# Patient Record
Sex: FEMALE | Race: WHITE | NOT HISPANIC OR LATINO | Employment: UNEMPLOYED | URBAN - METROPOLITAN AREA
[De-identification: names, ages, dates, MRNs, and addresses within clinical notes are randomized per-mention and may not be internally consistent; named-entity substitution may affect disease eponyms.]

---

## 2019-07-11 ENCOUNTER — HISTORICAL (OUTPATIENT)
Dept: RADIOLOGY | Facility: HOSPITAL | Age: 39
End: 2019-07-11

## 2019-10-05 ENCOUNTER — HISTORICAL (OUTPATIENT)
Dept: LAB | Facility: HOSPITAL | Age: 39
End: 2019-10-05

## 2019-10-05 LAB
ABS NEUT (OLG): 6.24 X10(3)/MCL (ref 2.1–9.2)
ALBUMIN SERPL-MCNC: 4.2 GM/DL (ref 3.4–5)
ALBUMIN/GLOB SERPL: 1.2 RATIO (ref 1.1–2)
ALP SERPL-CCNC: 56 UNIT/L (ref 46–116)
ALT SERPL-CCNC: 16 UNIT/L (ref 12–78)
APPEARANCE, UA: CLEAR
AST SERPL-CCNC: 15 UNIT/L (ref 15–37)
BACTERIA SPEC CULT: ABNORMAL
BASOPHILS # BLD AUTO: 0 X10(3)/MCL (ref 0–0.2)
BASOPHILS NFR BLD AUTO: 0 %
BILIRUB SERPL-MCNC: 0.3 MG/DL (ref 0.2–1)
BILIRUB UR QL STRIP: NEGATIVE
BILIRUBIN DIRECT+TOT PNL SERPL-MCNC: 0.13 MG/DL (ref 0–0.2)
BILIRUBIN DIRECT+TOT PNL SERPL-MCNC: 0.17 MG/DL (ref 0–0.8)
BUN SERPL-MCNC: 7.8 MG/DL (ref 7–18)
CALCIUM SERPL-MCNC: 9.4 MG/DL (ref 8.5–10.1)
CHLORIDE SERPL-SCNC: 103 MMOL/L (ref 98–107)
CHOLEST SERPL-MCNC: 194 MG/DL (ref 0–200)
CHOLEST/HDLC SERPL: 2.5 {RATIO} (ref 0–4)
CO2 SERPL-SCNC: 26.9 MMOL/L (ref 21–32)
COLOR UR: YELLOW
CREAT SERPL-MCNC: 0.87 MG/DL (ref 0.6–1.3)
DEPRECATED CALCIDIOL+CALCIFEROL SERPL-MC: 16.36 NG/ML (ref 30–80)
EOSINOPHIL # BLD AUTO: 0.1 X10(3)/MCL (ref 0–0.9)
EOSINOPHIL NFR BLD AUTO: 1 %
ERYTHROCYTE [DISTWIDTH] IN BLOOD BY AUTOMATED COUNT: 12.3 % (ref 11.5–17)
EST. AVERAGE GLUCOSE BLD GHB EST-MCNC: 91 MG/DL
GLOBULIN SER-MCNC: 3.6 GM/DL (ref 2.4–3.5)
GLUCOSE (UA): NEGATIVE
GLUCOSE SERPL-MCNC: 99 MG/DL (ref 74–106)
HBA1C MFR BLD: 4.8 % (ref 4.5–6.2)
HCT VFR BLD AUTO: 46.5 % (ref 37–47)
HDLC SERPL-MCNC: 77 MG/DL (ref 40–60)
HGB BLD-MCNC: 15 GM/DL (ref 12–16)
HGB UR QL STRIP: NEGATIVE
IMM GRANULOCYTES # BLD AUTO: 0.01 % (ref 0–0.02)
IMM GRANULOCYTES NFR BLD AUTO: 0.1 % (ref 0–0.43)
KETONES UR QL STRIP: NEGATIVE
LDLC SERPL CALC-MCNC: 107 MG/DL (ref 0–129)
LEUKOCYTE ESTERASE UR QL STRIP: ABNORMAL
LYMPHOCYTES # BLD AUTO: 1.6 X10(3)/MCL (ref 0.6–4.6)
LYMPHOCYTES NFR BLD AUTO: 19 %
MCH RBC QN AUTO: 32.2 PG (ref 27–31)
MCHC RBC AUTO-ENTMCNC: 32.3 GM/DL (ref 33–36)
MCV RBC AUTO: 99.8 FL (ref 80–94)
MONOCYTES # BLD AUTO: 0.5 X10(3)/MCL (ref 0.1–1.3)
MONOCYTES NFR BLD AUTO: 6 %
MUCOUS THREADS URNS QL MICRO: SLIGHT
NEUTROPHILS # BLD AUTO: 6.24 X10(3)/MCL (ref 1.4–7.9)
NEUTROPHILS NFR BLD AUTO: 74 %
NITRITE UR QL STRIP: NEGATIVE
PH UR STRIP: 6 [PH] (ref 5–9)
PLATELET # BLD AUTO: 369 X10(3)/MCL (ref 130–400)
PMV BLD AUTO: 10.2 FL (ref 9.4–12.4)
POTASSIUM SERPL-SCNC: 4.4 MMOL/L (ref 3.5–5.1)
PROT SERPL-MCNC: 7.8 GM/DL (ref 6.4–8.2)
PROT UR QL STRIP: NEGATIVE
RBC # BLD AUTO: 4.66 X10(6)/MCL (ref 4.2–5.4)
RBC #/AREA URNS HPF: ABNORMAL /HPF
SODIUM SERPL-SCNC: 139 MMOL/L (ref 136–145)
SP GR UR STRIP: 1.02 (ref 1–1.03)
SQUAMOUS EPITHELIAL, UA: ABNORMAL
TRIGL SERPL-MCNC: 51 MG/DL
TSH SERPL-ACNC: 1.56 MIU/ML (ref 0.36–3.74)
UROBILINOGEN UR STRIP-ACNC: 0.2
VLDLC SERPL CALC-MCNC: 10 MG/DL
WBC # SPEC AUTO: 8.4 X10(3)/MCL (ref 4.5–11.5)
WBC #/AREA URNS HPF: ABNORMAL /HPF

## 2020-09-10 ENCOUNTER — HISTORICAL (OUTPATIENT)
Dept: LAB | Facility: HOSPITAL | Age: 40
End: 2020-09-10

## 2020-12-23 ENCOUNTER — HISTORICAL (OUTPATIENT)
Dept: RADIOLOGY | Facility: HOSPITAL | Age: 40
End: 2020-12-23

## 2022-04-11 ENCOUNTER — HISTORICAL (OUTPATIENT)
Dept: ADMINISTRATIVE | Facility: HOSPITAL | Age: 42
End: 2022-04-11

## 2022-04-19 ENCOUNTER — HISTORICAL (OUTPATIENT)
Dept: RADIOLOGY | Facility: HOSPITAL | Age: 42
End: 2022-04-19

## 2022-04-19 ENCOUNTER — HISTORICAL (OUTPATIENT)
Dept: ADMINISTRATIVE | Facility: HOSPITAL | Age: 42
End: 2022-04-19

## 2022-04-29 VITALS
HEIGHT: 64 IN | WEIGHT: 177.69 LBS | BODY MASS INDEX: 30.34 KG/M2 | DIASTOLIC BLOOD PRESSURE: 82 MMHG | OXYGEN SATURATION: 97 % | SYSTOLIC BLOOD PRESSURE: 124 MMHG

## 2022-10-03 DIAGNOSIS — Z12.31 SCREENING MAMMOGRAM FOR BREAST CANCER: Primary | ICD-10-CM

## 2022-11-05 PROBLEM — F41.1 GENERALIZED ANXIETY DISORDER: Status: ACTIVE | Noted: 2022-11-05

## 2022-11-05 PROBLEM — Z00.00 WELLNESS EXAMINATION: Status: ACTIVE | Noted: 2022-11-05

## 2022-11-05 PROBLEM — E66.9 OBESITY: Status: ACTIVE | Noted: 2022-11-05

## 2023-02-06 PROBLEM — Z00.00 WELLNESS EXAMINATION: Status: RESOLVED | Noted: 2022-11-05 | Resolved: 2023-02-06

## 2023-11-21 ENCOUNTER — LAB VISIT (OUTPATIENT)
Dept: LAB | Facility: HOSPITAL | Age: 43
End: 2023-11-21
Attending: FAMILY MEDICINE
Payer: COMMERCIAL

## 2023-11-21 DIAGNOSIS — Z00.00 WELLNESS EXAMINATION: ICD-10-CM

## 2023-11-21 LAB
ALBUMIN SERPL-MCNC: 3.6 G/DL (ref 3.5–5)
ALBUMIN/GLOB SERPL: 1 RATIO (ref 1.1–2)
ALP SERPL-CCNC: 80 UNIT/L (ref 40–150)
ALT SERPL-CCNC: 10 UNIT/L (ref 0–55)
AST SERPL-CCNC: 16 UNIT/L (ref 5–34)
BASOPHILS # BLD AUTO: 0.02 X10(3)/MCL
BASOPHILS NFR BLD AUTO: 0.2 %
BILIRUB SERPL-MCNC: 0.3 MG/DL
BUN SERPL-MCNC: 7.9 MG/DL (ref 7–18.7)
CALCIUM SERPL-MCNC: 8.8 MG/DL (ref 8.4–10.2)
CHLORIDE SERPL-SCNC: 106 MMOL/L (ref 98–107)
CHOLEST SERPL-MCNC: 186 MG/DL
CHOLEST/HDLC SERPL: 3 {RATIO} (ref 0–5)
CO2 SERPL-SCNC: 23 MMOL/L (ref 22–29)
CREAT SERPL-MCNC: 0.75 MG/DL (ref 0.55–1.02)
DEPRECATED CALCIDIOL+CALCIFEROL SERPL-MC: 29.2 NG/ML (ref 30–80)
EOSINOPHIL # BLD AUTO: 0.16 X10(3)/MCL (ref 0–0.9)
EOSINOPHIL NFR BLD AUTO: 1.9 %
ERYTHROCYTE [DISTWIDTH] IN BLOOD BY AUTOMATED COUNT: 12.6 % (ref 11.5–17)
EST. AVERAGE GLUCOSE BLD GHB EST-MCNC: 96.8 MG/DL
FSH SERPL-ACNC: 3.74 MIU/ML
GFR SERPLBLD CREATININE-BSD FMLA CKD-EPI: >60 MLS/MIN/1.73/M2
GLOBULIN SER-MCNC: 3.5 GM/DL (ref 2.4–3.5)
GLUCOSE SERPL-MCNC: 98 MG/DL (ref 74–100)
HBA1C MFR BLD: 5 %
HCT VFR BLD AUTO: 44.6 % (ref 37–47)
HDLC SERPL-MCNC: 67 MG/DL (ref 35–60)
HGB BLD-MCNC: 14.1 G/DL (ref 12–16)
IMM GRANULOCYTES # BLD AUTO: 0.02 X10(3)/MCL (ref 0–0.04)
IMM GRANULOCYTES NFR BLD AUTO: 0.2 %
LDLC SERPL CALC-MCNC: 106 MG/DL (ref 50–140)
LYMPHOCYTES # BLD AUTO: 2.75 X10(3)/MCL (ref 0.6–4.6)
LYMPHOCYTES NFR BLD AUTO: 33 %
MCH RBC QN AUTO: 30.3 PG (ref 27–31)
MCHC RBC AUTO-ENTMCNC: 31.6 G/DL (ref 33–36)
MCV RBC AUTO: 95.7 FL (ref 80–94)
MONOCYTES # BLD AUTO: 0.51 X10(3)/MCL (ref 0.1–1.3)
MONOCYTES NFR BLD AUTO: 6.1 %
NEUTROPHILS # BLD AUTO: 4.87 X10(3)/MCL (ref 2.1–9.2)
NEUTROPHILS NFR BLD AUTO: 58.6 %
PLATELET # BLD AUTO: 383 X10(3)/MCL (ref 130–400)
PMV BLD AUTO: 9.8 FL (ref 7.4–10.4)
POTASSIUM SERPL-SCNC: 4.1 MMOL/L (ref 3.5–5.1)
PROT SERPL-MCNC: 7.1 GM/DL (ref 6.4–8.3)
RBC # BLD AUTO: 4.66 X10(6)/MCL (ref 4.2–5.4)
SODIUM SERPL-SCNC: 139 MMOL/L (ref 136–145)
T3FREE SERPL-MCNC: 3.43 PG/ML (ref 1.58–3.91)
T4 FREE SERPL-MCNC: 0.83 NG/DL (ref 0.7–1.48)
TRIGL SERPL-MCNC: 67 MG/DL (ref 37–140)
TSH SERPL-ACNC: 3.28 UIU/ML (ref 0.35–4.94)
VIT B12 SERPL-MCNC: 183 PG/ML (ref 213–816)
VLDLC SERPL CALC-MCNC: 13 MG/DL
WBC # SPEC AUTO: 8.33 X10(3)/MCL (ref 4.5–11.5)

## 2023-11-21 PROCEDURE — 84439 ASSAY OF FREE THYROXINE: CPT

## 2023-11-21 PROCEDURE — 84443 ASSAY THYROID STIM HORMONE: CPT

## 2023-11-21 PROCEDURE — 82607 VITAMIN B-12: CPT

## 2023-11-21 PROCEDURE — 82306 VITAMIN D 25 HYDROXY: CPT

## 2023-11-21 PROCEDURE — 83001 ASSAY OF GONADOTROPIN (FSH): CPT

## 2023-11-21 PROCEDURE — 85025 COMPLETE CBC W/AUTO DIFF WBC: CPT

## 2023-11-21 PROCEDURE — 84481 FREE ASSAY (FT-3): CPT

## 2023-11-21 PROCEDURE — 80061 LIPID PANEL: CPT

## 2023-11-21 PROCEDURE — 80053 COMPREHEN METABOLIC PANEL: CPT

## 2023-11-21 PROCEDURE — 36415 COLL VENOUS BLD VENIPUNCTURE: CPT

## 2023-11-21 PROCEDURE — 83036 HEMOGLOBIN GLYCOSYLATED A1C: CPT

## 2023-12-06 ENCOUNTER — HOSPITAL ENCOUNTER (OUTPATIENT)
Dept: RADIOLOGY | Facility: HOSPITAL | Age: 43
Discharge: HOME | End: 2023-12-06
Attending: FAMILY MEDICINE
Payer: COMMERCIAL

## 2023-12-06 DIAGNOSIS — Z12.31 SCREENING MAMMOGRAM FOR BREAST CANCER: ICD-10-CM

## 2023-12-06 PROCEDURE — 77067 MAMMO DIGITAL SCREENING BILAT WITH TOMO: ICD-10-PCS | Mod: 26,,, | Performed by: RADIOLOGY

## 2023-12-06 PROCEDURE — 77063 BREAST TOMOSYNTHESIS BI: CPT | Mod: 26,,, | Performed by: RADIOLOGY

## 2023-12-06 PROCEDURE — 77063 MAMMO DIGITAL SCREENING BILAT WITH TOMO: ICD-10-PCS | Mod: 26,,, | Performed by: RADIOLOGY

## 2023-12-06 PROCEDURE — 77067 SCR MAMMO BI INCL CAD: CPT | Mod: TC

## 2023-12-06 PROCEDURE — 77067 SCR MAMMO BI INCL CAD: CPT | Mod: 26,,, | Performed by: RADIOLOGY

## 2023-12-13 ENCOUNTER — HOSPITAL ENCOUNTER (OUTPATIENT)
Dept: RADIOLOGY | Facility: HOSPITAL | Age: 43
Discharge: HOME | End: 2023-12-13
Attending: FAMILY MEDICINE
Payer: COMMERCIAL

## 2023-12-13 VITALS — HEIGHT: 64 IN | BODY MASS INDEX: 36.56 KG/M2

## 2023-12-13 DIAGNOSIS — R92.8 ABNORMAL MAMMOGRAM: ICD-10-CM

## 2023-12-13 PROCEDURE — 77065 DX MAMMO INCL CAD UNI: CPT | Mod: TC,LT

## 2023-12-13 PROCEDURE — 77061 MAMMO DIGITAL DIAGNOSTIC LEFT WITH TOMO: ICD-10-PCS | Mod: 26,LT,, | Performed by: RADIOLOGY

## 2023-12-13 PROCEDURE — 77065 MAMMO DIGITAL DIAGNOSTIC LEFT WITH TOMO: ICD-10-PCS | Mod: 26,LT,, | Performed by: RADIOLOGY

## 2023-12-13 PROCEDURE — 76641 ULTRASOUND BREAST COMPLETE: CPT | Mod: 26,LT,, | Performed by: RADIOLOGY

## 2023-12-13 PROCEDURE — 77065 DX MAMMO INCL CAD UNI: CPT | Mod: 26,LT,, | Performed by: RADIOLOGY

## 2023-12-13 PROCEDURE — 76641 US BREAST LEFT COMPLETE: ICD-10-PCS | Mod: 26,LT,, | Performed by: RADIOLOGY

## 2023-12-13 PROCEDURE — 77061 BREAST TOMOSYNTHESIS UNI: CPT | Mod: 26,LT,, | Performed by: RADIOLOGY

## 2023-12-13 PROCEDURE — 76641 ULTRASOUND BREAST COMPLETE: CPT | Mod: TC,LT

## 2024-01-09 ENCOUNTER — HOSPITAL ENCOUNTER (EMERGENCY)
Facility: HOSPITAL | Age: 44
Discharge: HOME | End: 2024-01-09
Attending: FAMILY MEDICINE
Payer: COMMERCIAL

## 2024-01-09 VITALS
TEMPERATURE: 98 F | HEART RATE: 94 BPM | RESPIRATION RATE: 20 BRPM | HEIGHT: 64 IN | BODY MASS INDEX: 36.37 KG/M2 | DIASTOLIC BLOOD PRESSURE: 67 MMHG | WEIGHT: 213 LBS | OXYGEN SATURATION: 100 % | SYSTOLIC BLOOD PRESSURE: 113 MMHG

## 2024-01-09 DIAGNOSIS — R11.2 NAUSEA AND VOMITING, UNSPECIFIED VOMITING TYPE: Primary | ICD-10-CM

## 2024-01-09 DIAGNOSIS — R52 PAIN: ICD-10-CM

## 2024-01-09 LAB
AMPHET UR QL SCN: NEGATIVE
APPEARANCE UR: CLEAR
BACTERIA #/AREA URNS AUTO: ABNORMAL /HPF
BARBITURATE SCN PRESENT UR: NEGATIVE
BENZODIAZ UR QL SCN: NEGATIVE
BILIRUB UR QL STRIP.AUTO: ABNORMAL
CANNABINOIDS UR QL SCN: NEGATIVE
COCAINE UR QL SCN: NEGATIVE
COLOR UR AUTO: ABNORMAL
FLUAV AG UPPER RESP QL IA.RAPID: NOT DETECTED
FLUBV AG UPPER RESP QL IA.RAPID: NOT DETECTED
GLUCOSE UR QL STRIP.AUTO: NEGATIVE
KETONES UR QL STRIP.AUTO: 40
LEUKOCYTE ESTERASE UR QL STRIP.AUTO: NEGATIVE
MUCOUS THREADS URNS QL MICRO: ABNORMAL /LPF
NITRITE UR QL STRIP.AUTO: NEGATIVE
OPIATES UR QL SCN: NEGATIVE
PCP UR QL: NEGATIVE
PH UR STRIP.AUTO: 6 [PH]
PH UR: 6 [PH] (ref 3–11)
PROT UR QL STRIP.AUTO: 30
RBC #/AREA URNS AUTO: ABNORMAL /HPF
RBC UR QL AUTO: ABNORMAL
RSV A 5' UTR RNA NPH QL NAA+PROBE: NOT DETECTED
SARS-COV-2 RNA RESP QL NAA+PROBE: NOT DETECTED
SP GR UR STRIP.AUTO: >=1.03 (ref 1–1.03)
SPECIFIC GRAVITY, URINE AUTO (.000) (OHS): >=1.03 (ref 1–1.03)
SQUAMOUS #/AREA URNS AUTO: ABNORMAL /HPF
UROBILINOGEN UR STRIP-ACNC: 1
WBC #/AREA URNS AUTO: ABNORMAL /HPF

## 2024-01-09 PROCEDURE — 80307 DRUG TEST PRSMV CHEM ANLYZR: CPT | Performed by: FAMILY MEDICINE

## 2024-01-09 PROCEDURE — 81001 URINALYSIS AUTO W/SCOPE: CPT | Mod: XB | Performed by: FAMILY MEDICINE

## 2024-01-09 PROCEDURE — 0241U COVID/RSV/FLU A&B PCR: CPT | Performed by: FAMILY MEDICINE

## 2024-01-09 PROCEDURE — 99283 EMERGENCY DEPT VISIT LOW MDM: CPT

## 2024-01-09 PROCEDURE — 25000003 PHARM REV CODE 250: Performed by: FAMILY MEDICINE

## 2024-01-09 RX ORDER — ONDANSETRON 4 MG/1
8 TABLET, ORALLY DISINTEGRATING ORAL
Status: COMPLETED | OUTPATIENT
Start: 2024-01-09 | End: 2024-01-09

## 2024-01-09 RX ORDER — ONDANSETRON 4 MG/1
4 TABLET, FILM COATED ORAL EVERY 6 HOURS
Qty: 12 TABLET | Refills: 0 | Status: SHIPPED | OUTPATIENT
Start: 2024-01-09

## 2024-01-09 RX ADMIN — ONDANSETRON 8 MG: 4 TABLET, ORALLY DISINTEGRATING ORAL at 09:01

## 2024-01-09 NOTE — ED PROVIDER NOTES
Encounter Date: 1/9/2024       History     Chief Complaint   Patient presents with    Vomiting     Pt reports she has been vomiting with generalized abd pain. States yesterday she was constipated but last bm was yesterday. Also would like a bump on her back to be checked out.      44 complaining of vomiting this morning said she felt like she pulled a muscle in her mid back area after several episodes of vomiting no diarrhea no fevers and chills also having some burning when she pees        Review of patient's allergies indicates:  No Known Allergies  History reviewed. No pertinent past medical history.  History reviewed. No pertinent surgical history.  Family History   Problem Relation Age of Onset    Breast cancer Neg Hx         neg fam history of breast cancer     Social History     Tobacco Use    Smoking status: Never    Smokeless tobacco: Never     Review of Systems   Constitutional:  Negative for fever.   HENT:  Negative for sore throat.    Respiratory:  Negative for shortness of breath.    Cardiovascular:  Negative for chest pain.   Gastrointestinal:  Positive for vomiting. Negative for nausea.   Genitourinary:  Positive for dysuria.   Musculoskeletal:  Positive for back pain.   Skin:  Negative for rash.   Neurological:  Negative for weakness.   Hematological:  Does not bruise/bleed easily.   All other systems reviewed and are negative.      Physical Exam     Initial Vitals [01/09/24 0903]   BP Pulse Resp Temp SpO2   113/67 94 20 98.2 °F (36.8 °C) 100 %      MAP       --         Physical Exam    Nursing note and vitals reviewed.  Constitutional: She appears well-developed and well-nourished. She is active.   HENT:   Head: Normocephalic and atraumatic.   Mouth/Throat: No oropharyngeal exudate.   Eyes: Conjunctivae, EOM and lids are normal. Pupils are equal, round, and reactive to light.   Neck: Trachea normal and phonation normal. Neck supple. No thyroid mass present.   Normal range of motion.  Cardiovascular:   Normal rate, regular rhythm, normal heart sounds and normal pulses.           Pulmonary/Chest: Breath sounds normal.   Abdominal: Abdomen is soft. Bowel sounds are normal.   Musculoskeletal:         General: Tenderness present. Normal range of motion.      Cervical back: Normal range of motion and neck supple.     Neurological: She is alert and oriented to person, place, and time. She has normal strength and normal reflexes.   Skin: Skin is warm and intact.   Psychiatric: She has a normal mood and affect. Her speech is normal and behavior is normal. Judgment and thought content normal. Cognition and memory are normal.         ED Course   Procedures  Labs Reviewed   URINALYSIS, REFLEX TO URINE CULTURE - Abnormal; Notable for the following components:       Result Value    Protein, UA 30 (*)     Ketones, UA 40 (*)     Blood, UA Trace-Intact (*)     Bilirubin, UA Small (*)     All other components within normal limits   URINALYSIS, MICROSCOPIC - Abnormal; Notable for the following components:    Mucous, UA Trace (*)     Squamous Epithelial Cells, UA Moderate (*)     All other components within normal limits   COVID/RSV/FLU A&B PCR - Normal    Narrative:     The Xpert Xpress SARS-CoV-2/FLU/RSV plus is a rapid, multiplexed real-time PCR test intended for the simultaneous qualitative detection and differentiation of SARS-CoV-2, Influenza A, Influenza B, and respiratory syncytial virus (RSV) viral RNA in either nasopharyngeal swab or nasal swab specimens.         DRUG SCREEN, URINE (BEAKER) - Normal    Narrative:     Cut off concentrations:    Amphetamines - 1000 ng/ml  Barbiturates - 200 ng/ml  Benzodiazepine - 200 ng/ml  Cannabinoids (THC) - 50 ng/ml  Cocaine - 300 ng/ml  Fentanyl - 1.0 ng/ml  MDMA - 500 ng/ml  Opiates - 300 ng/ml   Phencyclidine (PCP) - 25 ng/ml    Specimen submitted for drug analysis and tested for pH and specific gravity in order to evaluate sample integrity. Suspect tampering if specific gravity is  <1.003 and/or pH is not within the range of 4.5 - 8.0  False negatives may result form substances such as bleach added to urine.  False positives may result for the presence of a substance with similar chemical structure to the drug or its metabolite.    This test provides only a PRELIMINARY analytical test result. A more specific alternate chemical method must be used in order to obtain a confirmed analytical result. Gas chromatography/mass spectrometry (GC/MS) is the preferred confirmatory method. Other chemical confirmation methods are available. Clinical consideration and professional judgement should be applied to any drug of abuse test result, particularly when preliminary positive results are used.    Positive results will be confirmed only at the physicians request. Unconfirmed screening results are to be used only for medical purposes (treatment).               Imaging Results              X-Ray Abdomen Flat And Erect (Final result)  Result time 01/09/24 10:02:54      Final result by Honorio Chambers MD (01/09/24 10:02:54)                   Impression:      Nonspecific bowel gas pattern.      Electronically signed by: Honorio Chambers  Date:    01/09/2024  Time:    10:02               Narrative:    EXAMINATION:  XR ABDOMEN FLAT AND ERECT    CLINICAL HISTORY:  Pain, unspecified    TECHNIQUE:  Two views    COMPARISON:  None available    FINDINGS:  The intestinal gas pattern is nonspecific and nonobstructive. No air fluid levels or pneumoperitoneum identified.  Visualized portion of the lungs are clear.  There is lumbar dextroscoliotic curvature.                                       Medications   ondansetron disintegrating tablet 8 mg (8 mg Oral Given 1/9/24 5143)     Medical Decision Making  44 complaining of vomiting this morning said she felt like she pulled a muscle in her mid back area after several episodes of vomiting no diarrhea no fevers and chills also having some burning when she pees      Workup  essentially negative urine is clear back has some palpable spasm x-ray of abdomen benign patient says Zofran work nausea and vomiting have resolved we will take Tylenol for back    Amount and/or Complexity of Data Reviewed  Labs:  Decision-making details documented in ED Course.  Radiology: ordered.    Risk  Prescription drug management.  Risk Details: For tomorrow hospitalists taking anti-inflammatories differential diagnosis back spasms abdominal pain diagnosis    Gastroenteritis obstruction constipation UTI           ED Course as of 01/09/24 1042   Tue Jan 09, 2024   0955 NITRITE UA: Negative [BL]   0955 Leukocytes, UA: Negative [BL]   0955 Bacteria, UA: Rare [BL]   1040 Influenza A, Molecular: Not Detected [BL]   1040 Influenza B, Molecular: Not Detected [BL]   1040 RSV Ag by Molecular Method: Not Detected [BL]      ED Course User Index  [BL] Ramone Galarza MD                           Clinical Impression:  Final diagnoses:  [R52] Pain  [R11.2] Nausea and vomiting, unspecified vomiting type (Primary)          ED Disposition Condition    Discharge Stable          ED Prescriptions       Medication Sig Dispense Start Date End Date Auth. Provider    ondansetron (ZOFRAN) 4 MG tablet Take 1 tablet (4 mg total) by mouth every 6 (six) hours. 12 tablet 1/9/2024 -- Ramone Galarza MD          Follow-up Information       Follow up With Specialties Details Why Contact Info    Keyla Snowden MD Family Medicine  As needed [address] [place] [address]  [phone]               Ramone Galarza MD  01/09/24 1043

## 2024-01-16 ENCOUNTER — HOSPITAL ENCOUNTER (OUTPATIENT)
Dept: RADIOLOGY | Facility: HOSPITAL | Age: 44
Discharge: HOME | End: 2024-01-16
Attending: FAMILY MEDICINE
Payer: COMMERCIAL

## 2024-01-16 DIAGNOSIS — R92.8 ABNORMAL FINDINGS ON DIAGNOSTIC IMAGING OF BREAST: ICD-10-CM

## 2024-01-16 PROCEDURE — 19084 BX BREAST ADD LESION US IMAG: CPT

## 2024-01-16 PROCEDURE — 77061 BREAST TOMOSYNTHESIS UNI: CPT | Mod: TC,LT

## 2024-01-16 PROCEDURE — 77061 BREAST TOMOSYNTHESIS UNI: CPT | Mod: 26,LT,, | Performed by: STUDENT IN AN ORGANIZED HEALTH CARE EDUCATION/TRAINING PROGRAM

## 2024-01-16 PROCEDURE — 19083 BX BREAST 1ST LESION US IMAG: CPT | Mod: LT

## 2024-01-16 PROCEDURE — 77065 DX MAMMO INCL CAD UNI: CPT | Mod: TC,LT

## 2024-01-16 PROCEDURE — 77065 DX MAMMO INCL CAD UNI: CPT | Mod: 26,LT,, | Performed by: STUDENT IN AN ORGANIZED HEALTH CARE EDUCATION/TRAINING PROGRAM

## 2024-01-16 PROCEDURE — 19084 BX BREAST ADD LESION US IMAG: CPT | Mod: LT,,, | Performed by: STUDENT IN AN ORGANIZED HEALTH CARE EDUCATION/TRAINING PROGRAM

## 2024-01-16 PROCEDURE — 19083 BX BREAST 1ST LESION US IMAG: CPT | Mod: LT,,, | Performed by: STUDENT IN AN ORGANIZED HEALTH CARE EDUCATION/TRAINING PROGRAM

## 2024-01-22 ENCOUNTER — TELEPHONE (OUTPATIENT)
Dept: RADIOLOGY | Facility: HOSPITAL | Age: 44
End: 2024-01-22
Payer: COMMERCIAL

## 2024-01-22 DIAGNOSIS — C50.912 INVASIVE DUCTAL CARCINOMA OF LEFT BREAST: Primary | ICD-10-CM

## 2024-01-22 NOTE — PROGRESS NOTES
[facility] Breast Surg  Breast Surgical Oncology  New Patient Office Visit - H&P      Referring Provider: Dr. Keyla Snowden   PCP: Keyla Snowden MD     Chief Complaint:   Chief Complaint   Patient presents with    Breast Cancer     Patient c/o of left breast LIQ pressure, swelling, no redness        Subjective:       HPI:  Brittnee Wakefield is a 44 y.o. female who presents on 2024 for evaluation of newly diagnosed left  breast cancer.    A detailed patient history was obtained and reviewed. She currently denies any breast issues including rashes, redness, pain, swelling, nipple discharge, or new lumps/masses.    MG breast density: Category C (heterogeneously dense)     Imagin2023 BL SC MG at [facility]- which revealed in L breast at 8 o'clock -9 o'clock middle depth, there is an irregular mass with spiculated margins and associated architectural distortion.  At 11 o'clock- 12 o'clock L breast anterior depth, there is an oval mass seen.   Additionally, there is an oval mass in the L breast upper outer quadrant middle depth.   No significant masses, calcifications, or other findings are seen in the R breast. BIRADS-0; additional imaging needed    2. 2023 L DG MG/ L US BREAST COMPLETE at [facility]- which revealed on MG:       a. At 8 o'clock middle depth, there is a persistent 0.8 cm irregular equal density mass with spiculated margins and associated architectural distortion (spanning 4.5 cm)        b. At 11 o'clock anterior depth, there is a persistent 1.0 cm oval high density mass with circumscribed margins,         c. At 1 o'clock middle depth, , there is a 0.9 cm oval equal density mass with circumscribed margins      On L US:         a. At 8 o'clock 5 cm FN,  there is a 0.8 cm x 0.7 cm x 0.7 cm irregular avascular hypoechoic mass with spiculated margins, posterior shadowing, and associated architectural distortion           b  At 11 o'clock 3 cm FN, there is a 1.0 cm  x 0.6 cm x 0.8 cm oval avascular hypoechoic mass with angular margins and posterior shadowing           c. At 1 o'clock  3 cm FN, there is a 0.9 cm x 0.4 cm x 0.8 cm parallel oval avascular hypoechoic mass with circumscribed margins   BIRADS-5 highly suspicious;biopsy recommended.    Pathology:  2024 Ultrasound-guided Core Needle Biopsy Left Breast 8 o'clock 5 cm FN-        - Invasive ductal carcinoma, grade 1 (measuring 9.0 mm)        - Ductal carcinoma in situ, grade 2 without necrosis          ER 72%          PA 84%          HER 2 ezio 1+          Ki67 10%    2. 2024 Ultrasound-guided Core Needle Biopsy Left Breast 11 o'clock 3 cm FN-        - Fibroadenoma/ fibroadenomatoid change  3. 2024 Ultrasound-guided Core Needle Biopsy Left Breast 1 o'clock 3 cm FN-        - Fibroadenomatoid change and sclerosing adenosis      OB/GYN History:  Age at Menarche Onset: 13  Menopausal Status: premenopausal, LMP: No LMP recorded. Patient is perimenopausal.  Hysterectomy/Oophorectomy: NA, at age NA  Hormonal birth control (duration): Yes OCP (estrogen/progesterone).   Pregnancy History:   Age at first live birth: 25  Hormone Replacement Therapy: No, none  Patient did not breast feed.  Patient denies nipple discharge.   Patient denies to previous breast biopsy.   Patient denies to a personal history of breast cancer.    Other Relevant History:  Prior thoracic RT: none  Genetic testing: No  Ashkenazi [employer] descent: No    Family History:  Family History   Problem Relation Age of Onset    Liver cancer Father     Hepatitis Maternal Grandmother     Breast cancer Maternal Aunt         Past History:  Past Medical History:   Diagnosis Date    Anxiety     Cancer     History of seizures     At the age of 1yo        Past Surgical History:   Procedure Laterality Date     SECTION          Social History     Socioeconomic History    Marital status:    Tobacco Use    Smoking status: Former     Types: Cigarettes  "   Smokeless tobacco: Never   Substance and Sexual Activity    Alcohol use: Not Currently    Drug use: Never        Body mass index is 37.08 kg/m².     Allergy/Medications:   Review of patient's allergies indicates:  No Known Allergies       Current Outpatient Medications:     aspirin (ECOTRIN) 81 MG EC tablet, Take 81 mg by mouth once daily., Disp: , Rfl:     diphenhydrAMINE (BENADRYL) 25 mg capsule, Take 25 mg by mouth every 6 (six) hours as needed for Itching., Disp: , Rfl:     FLUoxetine 20 MG capsule, Take 1 capsule (20 mg total) by mouth once daily., Disp: 90 capsule, Rfl: 3    NORTREL 1/35, 28, 1-35 mg-mcg per tablet, Take 1 tablet by mouth once daily., Disp: 30 tablet, Rfl: 5    ondansetron (ZOFRAN) 4 MG tablet, Take 1 tablet (4 mg total) by mouth every 6 (six) hours., Disp: 12 tablet, Rfl: 0       Review of Systems:  Review of Systems   Constitutional:  Negative for chills, fever and weight loss.        Weight Gain since career change   HENT:  Negative for sore throat.    Eyes:  Negative for blurred vision and double vision.   Respiratory:  Negative for cough and shortness of breath.    Cardiovascular:  Negative for chest pain, palpitations and leg swelling.   Gastrointestinal:  Negative for abdominal pain, constipation, diarrhea, heartburn, nausea and vomiting.   Genitourinary:  Positive for frequency. Negative for dysuria, flank pain, hematuria and urgency.   Musculoskeletal:  Negative for back pain, joint pain and myalgias.   Neurological:  Positive for tingling. Negative for dizziness and headaches.        Left hand - notices upon waking   Endo/Heme/Allergies:  Does not bruise/bleed easily.   Psychiatric/Behavioral:  Negative for depression. The patient is not nervous/anxious.           Objective:     Vitals:  Blood pressure 122/79, pulse 81, temperature 97.5 °F (36.4 °C), temperature source Oral, resp. rate 20, height 5' 4" (1.626 m), weight 98 kg (216 lb), SpO2 98 %.      Physical Exam:  General: The " patient is awake, alert and oriented times three. The patient is well nourished and in no acute distress.   Neck: There is no evidence of palpable cervical, supraclavicular or axillary adenopathy. The neck is supple. The thyroid is not enlarged.   Musculoskeletal: The patient has a normal range of motion of her bilateral upper extremities.   Chest: Examination of the chest wall fails to reveal any obvious abnormalities. The lungs are clear to auscultation bilaterally without rales, rhonchi, or wheezing.   Cardiovascular: The heart has a regular rate and rhythm without murmurs, gallops or rubs.  Breast:  Right: Examination of right breast fails to reveal any dominant masses or areas of significant focal nodularity. The nipple is everted without evidence of discharge. There is no skin dimpling with movement of the pectoralis. There are no significant skin changes overlying the breast.   Left: Examination of the left breast fails to reveal any dominant masses or areas of significant focal nodularity. The nipple is everted without evidence of discharge. There is no skin dimpling with movement of the pectoralis. There are no significant skin changes overlying the breast.  Abdomen: The abdomen is soft, flat, nontender and nondistended with no palpable masses or organomegaly.  Integumentary: no rashes or skin lesions present  Neurologic: cranial nerves intact, no signs of peripheral neurological deficit, motor/sensory function intact    Assessment and Discussion:      Cancer Staging   Malignant neoplasm of lower-inner quadrant of left breast in female, estrogen receptor positive  Staging form: Breast, AJCC 8th Edition  - Clinical stage from 1/25/2024: Stage IA (cT1b, cN0, cM0, G1, ER+, AL+, HER2-) - Signed by Teri Greenberg MD on 1/26/2024        Encounter Diagnoses   Name Primary?    Malignant neoplasm of lower-inner quadrant of left breast in female, estrogen receptor positive Yes         We discussed her imaging and  "pathology results in detail     We discussed the need to proceed with local and systemic treatment.      Local Surgical Treatment options:     Breast Surgical Procedures  Breast Conservation Surgery (Partial Mastectomy or Lumpectomy)  Palpation-Guided - removal of breast cancer which is felt on clinical exam  Localization-Guided - required placement of a MagSeed and/or wire (if needed) in the area of concern to allow for identification during surgery. This will take place prior to surgery (usually a few days before). During surgery the MagSeed is detected with a probe and the cancer area is removed along with the MagSeed and/or previously placed clip.   Incisions are usually closed with dissolving stitches, followed by glue and Dermabond.   Mastectomy  Simple - includes removal of breast skin, subcutaneous (fat) tissue, and nipple areolar complex.   Skin-Sparing - includes removal of the breast skin while sparing enough for reconstruction. It also is involves removal of the subcutaneous (fat) tissue and nipple areolar complex.  Nipple Sparing - includes removal of all the breast tissue underneath the nipple, areola, and breast skin is removed. The tissue beneath the nipple and areola are checked for cancer. If cancer is detected, the nipple and areola are then removed.   Surgical Risk include surgical site infections, hematoma or seroma requiring operation, necrosis of nipple-areola and/or mastectomy flaps requiring debridement or hyperbaric therapy, unplanned re-operations, and delay in adjuvant treatments.    Drain placement may be necessary after mastectomy and can remain in place for greater than 10-14 day, occasionally longer.     Axillary Surgical Procedures  [place] Lymph Node Biopsy  Technetium-99 - a clear substance injected around the nipple and areola on the day of surgery prior to surgery starting which provides a sound "road" map to the lymph nodes needing to be removed for further examination.  Blue " "Dye - either Methylene Blue or Isosulfan is given in the operating room and provides a color "road" map to the lymph nodes needing to be removed for further examination.  MagTrace (Superparamagnetic oxide) - is used to assist with lymph node mapping as well.  Risk - axillary swelling related to bleeding or serous fluid, infection, nerve damage (temporary or permanent), lymphedema (5-6% risk), additional surgery related to final pathology or complications from the procedure  Axillary Lymph Node Dissection  Risk including  the above plus nerve injury which could be permanent and lymphedema risk above 20-25%     Reconstruction Procedures  Implant- based  Autologous-based  Combination  Immediate versus Delayed  Oncoplastic reduction     The pros and cons of these reconstructive methods were addressed. A second operation maybe required before the final completion of the reconstructive process. I also explained to the patient that the reconstructive options are generally by law required to be covered by insurance and would be considered part of a cancer operation and not a cosmetic operation.     Radiation Treatment Options:  Whole-Breast  Partial Breast      Systemic treatment options:  Hormone Blocker/Endocrine Therapy  Tamoxifen  Raloxifene  Aromatase Inhibitor   Letrozole (Femara)   Anastrozole (Arimidex)   Exemestane (Aromasin)     2. Chemotherapy     3. Immunomodulator & Target Therapies (such as Herceptin)     Plan:         Problem List Items Addressed This Visit          Oncology    Malignant neoplasm of lower-inner quadrant of left breast in female, estrogen receptor positive - Primary    Current Assessment & Plan     Recommend BL breast MRI - re: evaluation of newly diagnosed breast cancer.  We also discussed Genetic Counseling and Testing - she would like to proceed with genetic testing, just not at today's visit.          Relevant Orders    MRI Breast w/wo Contrast, w/CAD, Bilateral           All of questions " were answered    Teri Greenberg MD  Breast Surgical Oncology     OFFICE VISIT CODING:    Non-face-to-face time included:  Yes Preparing to see the patient such as reviewing the patient record  Yes Obtaining and reviewing separately obtained history  Yes Independently interpreting results  Yes Documenting clinical information in electronic health record  No Ordering appropriate medications  Yes Ordering appropriate tests  Yes Ordering appropriate procedures (including follow-up)  Yes Referring and communicating with other health care professionals (not separately reported)  Yes Care Coordination (not separately reported)    Face-to-face time included:  Yes Performing a medically necessary appropriate history, examination, and/or evaluation  Yes Communicating results to the patient/family/caregiver  Yes Counseling and educating the patient/family/caregiver  Yes Answering patient/family/caregiver questions    Total Time: 60 minutes    Total time includes both face-to-face and non-face-to-face time personally spent by myself on the day of the visit.

## 2024-01-22 NOTE — CARE UPDATE
Referral to breast surgery for Left Invasive Ductal Carcinoma. (Bx 1/16/24). Results called to patient.

## 2024-01-22 NOTE — PROGRESS NOTES
Referral to breast surgery clinic for left invasive ductal carcinoma per radiologist recommendation. Results and recommendation called to patient. Appointment scheduled with Dr. Teri Greenberg 1/25/24 10:00 a.m. Patient is aware of appointment.

## 2024-01-23 LAB — PSYCHE PATHOLOGY RESULT: NORMAL

## 2024-01-25 ENCOUNTER — OFFICE VISIT (OUTPATIENT)
Dept: SURGERY | Facility: CLINIC | Age: 44
End: 2024-01-25
Payer: COMMERCIAL

## 2024-01-25 VITALS
TEMPERATURE: 98 F | HEIGHT: 64 IN | BODY MASS INDEX: 36.88 KG/M2 | HEART RATE: 81 BPM | WEIGHT: 216 LBS | RESPIRATION RATE: 20 BRPM | SYSTOLIC BLOOD PRESSURE: 122 MMHG | DIASTOLIC BLOOD PRESSURE: 79 MMHG | OXYGEN SATURATION: 98 %

## 2024-01-25 DIAGNOSIS — Z17.0 MALIGNANT NEOPLASM OF LOWER-INNER QUADRANT OF LEFT BREAST IN FEMALE, ESTROGEN RECEPTOR POSITIVE: Primary | ICD-10-CM

## 2024-01-25 DIAGNOSIS — C50.312 MALIGNANT NEOPLASM OF LOWER-INNER QUADRANT OF LEFT BREAST IN FEMALE, ESTROGEN RECEPTOR POSITIVE: Primary | ICD-10-CM

## 2024-01-25 PROCEDURE — 99999 PR PBB SHADOW E&M-EST. PATIENT-LVL IV: CPT | Mod: PBBFAC,,, | Performed by: SURGERY

## 2024-01-25 PROCEDURE — 3008F BODY MASS INDEX DOCD: CPT | Mod: CPTII,S$GLB,, | Performed by: SURGERY

## 2024-01-25 PROCEDURE — 99205 OFFICE O/P NEW HI 60 MIN: CPT | Mod: S$GLB,,, | Performed by: SURGERY

## 2024-01-25 PROCEDURE — 1160F RVW MEDS BY RX/DR IN RCRD: CPT | Mod: CPTII,S$GLB,, | Performed by: SURGERY

## 2024-01-25 PROCEDURE — 3074F SYST BP LT 130 MM HG: CPT | Mod: CPTII,S$GLB,, | Performed by: SURGERY

## 2024-01-25 PROCEDURE — 1159F MED LIST DOCD IN RCRD: CPT | Mod: CPTII,S$GLB,, | Performed by: SURGERY

## 2024-01-25 PROCEDURE — 3078F DIAST BP <80 MM HG: CPT | Mod: CPTII,S$GLB,, | Performed by: SURGERY

## 2024-01-25 RX ORDER — DIPHENHYDRAMINE HCL 25 MG
25 CAPSULE ORAL EVERY 6 HOURS PRN
COMMUNITY

## 2024-01-25 RX ORDER — ASPIRIN 81 MG/1
81 TABLET ORAL DAILY
Status: ON HOLD | COMMUNITY
End: 2024-02-19

## 2024-01-25 NOTE — NURSING
Breast Nurse Navigator Note    Distress Screening Tool  Distress Score    Distress Score: 1    Met with patient after consult with Dr. Greenberg.  Referred to the following outside resources: [employer] and [facility]. Verbalized understanding that these resources may provide support throughout the course of her treatment. Patient amenable to receiving additional support and gave her permission to be referred to these organizations.   Breast Cancer Education: Breast Cancer Binder and Nurse Lis given to the patient.   All questions answers to the patient's satisfaction.

## 2024-01-26 PROBLEM — Z17.0 MALIGNANT NEOPLASM OF LOWER-INNER QUADRANT OF LEFT BREAST IN FEMALE, ESTROGEN RECEPTOR POSITIVE: Status: ACTIVE | Noted: 2024-01-26

## 2024-01-26 PROBLEM — C50.312 MALIGNANT NEOPLASM OF LOWER-INNER QUADRANT OF LEFT BREAST IN FEMALE, ESTROGEN RECEPTOR POSITIVE: Status: ACTIVE | Noted: 2024-01-26

## 2024-01-26 NOTE — ASSESSMENT & PLAN NOTE
Recommend BL breast MRI - re: evaluation of newly diagnosed breast cancer.  We also discussed Genetic Counseling and Testing - she would like to proceed with genetic testing, just not at today's visit.

## 2024-01-31 ENCOUNTER — APPOINTMENT (OUTPATIENT)
Dept: RADIOLOGY | Facility: HOSPITAL | Age: 44
End: 2024-01-31
Attending: SURGERY
Payer: COMMERCIAL

## 2024-01-31 DIAGNOSIS — C50.312 MALIGNANT NEOPLASM OF LOWER-INNER QUADRANT OF LEFT BREAST IN FEMALE, ESTROGEN RECEPTOR POSITIVE: ICD-10-CM

## 2024-01-31 DIAGNOSIS — Z17.0 MALIGNANT NEOPLASM OF LOWER-INNER QUADRANT OF LEFT BREAST IN FEMALE, ESTROGEN RECEPTOR POSITIVE: ICD-10-CM

## 2024-01-31 PROCEDURE — 25500020 PHARM REV CODE 255: Performed by: SURGERY

## 2024-01-31 PROCEDURE — A9577 INJ MULTIHANCE: HCPCS | Performed by: SURGERY

## 2024-01-31 PROCEDURE — 77049 MRI BREAST C-+ W/CAD BI: CPT | Mod: 26,,, | Performed by: RADIOLOGY

## 2024-01-31 PROCEDURE — 77049 MRI BREAST C-+ W/CAD BI: CPT | Mod: TC

## 2024-01-31 RX ADMIN — GADOBENATE DIMEGLUMINE 20 ML: 529 INJECTION, SOLUTION INTRAVENOUS at 10:01

## 2024-02-05 NOTE — H&P (VIEW-ONLY)
[facility] Breast Surg  Breast Surgical Oncology  Follow-Up Patient Office Visit       Referring Provider: No ref. provider found  PCP: Keyla Snowden MD   Medical Oncologist: No care team member to display   Radiation Oncologist: No care team member to display   Other Care Providers:     Chief Complaint:   Chief Complaint   Patient presents with    Follow-up     Patient reports no breast related concerns         Subjective:   Treatment History:  None       Interval History:  2024 - Brittnee Wakefield presents today for follow up after Breast MRI and surgical discussion.    HPI:  Brittnee Wakefield is a 44 y.o. female who presents on 2024 for evaluation of newly diagnosed left  breast cancer.     A detailed patient history was obtained and reviewed. She currently denies any breast issues including rashes, redness, pain, swelling, nipple discharge, or new lumps/masses.     MG breast density: Category C (heterogeneously dense)      Imagin2023 BL SC MG at [facility]- which revealed in L breast at 8 o'clock -9 o'clock middle depth, there is an irregular mass with spiculated margins and associated architectural distortion.  At 11 o'clock- 12 o'clock L breast anterior depth, there is an oval mass seen.   Additionally, there is an oval mass in the L breast upper outer quadrant middle depth.   No significant masses, calcifications, or other findings are seen in the R breast. BIRADS-0; additional imaging needed     2. 2023 L DG MG/ L US BREAST COMPLETE at [facility]- which revealed on MG:       a. At 8 o'clock middle depth, there is a persistent 0.8 cm irregular equal density mass with spiculated margins and associated architectural distortion (spanning 4.5 cm)        b. At 11 o'clock anterior depth, there is a persistent 1.0 cm oval high density mass with circumscribed margins,         c. At 1 o'clock middle depth, , there is a 0.9 cm oval equal density mass with circumscribed  margins      On L US:         a. At 8 o'clock 5 cm FN,  there is a 0.8 cm x 0.7 cm x 0.7 cm irregular avascular hypoechoic mass with spiculated margins, posterior shadowing, and associated architectural distortion           b  At 11 o'clock 3 cm FN, there is a 1.0 cm x 0.6 cm x 0.8 cm oval avascular hypoechoic mass with angular margins and posterior shadowing           c. At 1 o'clock  3 cm FN, there is a 0.9 cm x 0.4 cm x 0.8 cm parallel oval avascular hypoechoic mass with circumscribed margins   BIRADS-5 highly suspicious;biopsy recommended.    3. 1/31/2024 BL BREAST MRI- which revealed       Left Breast-        A. At 8 o'clock 5 cm FN,  there is a 1.3 x 0.9 x 0.7 cm irregular, enhancing mass with spiculated margins (biopsy proven malignancy)        B. At 1 o'clock 3 cm FN, there is a 0.9 x 0.5 x 1.0 cm oval, enhancing mass with circumscribed margins (biopsy proven benign fibroadenomatoid change and sclerosing adenosis)         C. At 11 o'clock 3 cm FN, there is a 1.0 x 0.9 x 0.8 cm oval, enhancing mass with circumscribed margins.(biopsy proven fibroadenoma/fibroadenomatoid change)         Right Breast- .no suspicious areas of enhancement or areas of architectural distortion are seen.  There is no skin thickening or nipple retraction.  No axillary or internal mammary lymphadenopathy is identified.     BIRADS-6 Known biopsy proven malignancy          Pathology:  1/16/2024 Ultrasound-guided Core Needle Biopsy Left Breast 8 o'clock 5 cm FN-        - Invasive ductal carcinoma, grade 1 (measuring 9.0 mm)        - Ductal carcinoma in situ, grade 2 without necrosis          ER 72%          NC 84%          HER 2 ezio 1+          Ki67 10%     2. 1/16/2024 Ultrasound-guided Core Needle Biopsy Left Breast 11 o'clock 3 cm FN-        - Fibroadenoma/ fibroadenomatoid change  3. 1/16/2024 Ultrasound-guided Core Needle Biopsy Left Breast 1 o'clock 3 cm FN-        - Fibroadenomatoid change and sclerosing adenosis        OB/GYN  History:  Age at Menarche Onset: 13  Menopausal Status: premenopausal, LMP: No LMP recorded. Patient is perimenopausal.  Hysterectomy/Oophorectomy: NA, at age NA  Hormonal birth control (duration): Yes OCP (estrogen/progesterone).   Pregnancy History:   Age at first live birth: 25  Hormone Replacement Therapy: No, none  Patient did not breast feed.  Patient denies nipple discharge.   Patient denies to previous breast biopsy.   Patient denies to a personal history of breast cancer.     Other Relevant History:  Prior thoracic RT: none  Genetic testing: No  Ashkenazi Muslim descent: No     Family History:  Family History   Problem Relation Age of Onset    Liver cancer Father     Hepatitis Maternal Grandmother     Breast cancer Maternal Aunt         Past History:  Past Medical History:   Diagnosis Date    Anxiety     Cancer     History of seizures     At the age of 3yo        Past Surgical History:   Procedure Laterality Date     SECTION          Social History     Socioeconomic History    Marital status:    Tobacco Use    Smoking status: Former     Types: Cigarettes    Smokeless tobacco: Never   Substance and Sexual Activity    Alcohol use: Not Currently    Drug use: Never        Body mass index is 37.08 kg/m².     Allergy/Medications:   Review of patient's allergies indicates:  No Known Allergies       Current Outpatient Medications:     aspirin (ECOTRIN) 81 MG EC tablet, Take 81 mg by mouth once daily., Disp: , Rfl:     diphenhydrAMINE (BENADRYL) 25 mg capsule, Take 25 mg by mouth every 6 (six) hours as needed for Itching., Disp: , Rfl:     FLUoxetine 20 MG capsule, Take 1 capsule (20 mg total) by mouth once daily., Disp: 90 capsule, Rfl: 3    NORTREL 1/35, 28, 1-35 mg-mcg per tablet, Take 1 tablet by mouth once daily., Disp: 30 tablet, Rfl: 5    ondansetron (ZOFRAN) 4 MG tablet, Take 1 tablet (4 mg total) by mouth every 6 (six) hours., Disp: 12 tablet, Rfl: 0       Review of  "Systems:  ROS        Objective:     Vitals:  Blood pressure 114/70, pulse 72, temperature 97.9 °F (36.6 °C), temperature source Oral, resp. rate 18, height 5' 4" (1.626 m), weight 98 kg (216 lb), SpO2 96 %.      Physical Exam:  General: The patient is awake, alert and oriented times three. The patient is well nourished and in no acute distress.   Neck: There is no evidence of palpable cervical, supraclavicular or axillary adenopathy. The neck is supple. The thyroid is not enlarged.   Musculoskeletal: The patient has a normal range of motion of her bilateral upper extremities.   Chest: Examination of the chest wall fails to reveal any obvious abnormalities. The lungs are clear to auscultation bilaterally without rales, rhonchi, or wheezing.   Cardiovascular: The heart has a regular rate and rhythm without murmurs, gallops or rubs.   Breast:   Right: Examination of right breast fails to reveal any dominant masses or areas of significant focal nodularity. The nipple is everted without evidence of discharge. There is no skin dimpling with movement of the pectoralis. There is no significant skin changes overlying the breast.   Left: Examination of the left breast fails to reveal any dominant masses or areas of significant focal nodularity. The nipple is everted without evidence of discharge. There is no skin dimpling with movement of the pectoralis. There is no significant skin changes overlying the breast.  Abdomen: The abdomen is soft, flat, nontender and nondistended with no palpable masses or organomegaly.  Integumentary: no rashes or skin lesions present  Neurologic: cranial nerves intact, no signs of peripheral neurological deficit, motor/sensory function intact     Latest Reference Range & Units 11/21/23 10:55   WBC 4.50 - 11.50 x10(3)/mcL 8.33   RBC 4.20 - 5.40 x10(6)/mcL 4.66   Hemoglobin 12.0 - 16.0 g/dL 14.1   Hematocrit 37.0 - 47.0 % 44.6   MCV 80.0 - 94.0 fL 95.7 (H)   MCH 27.0 - 31.0 pg 30.3   MCHC 33.0 - " 36.0 g/dL 31.6 (L)   RDW 11.5 - 17.0 % 12.6   Platelet Count 130 - 400 x10(3)/mcL 383   MPV 7.4 - 10.4 fL 9.8   Neut % % 58.6   LYMPH % % 33.0   Mono % % 6.1   Eos % % 1.9   Basophil % % 0.2   Immature Granulocytes % 0.2   Neut # 2.1 - 9.2 x10(3)/mcL 4.87   Lymph # 0.6 - 4.6 x10(3)/mcL 2.75   Mono # 0.1 - 1.3 x10(3)/mcL 0.51   Eos # 0 - 0.9 x10(3)/mcL 0.16   Baso # <=0.2 x10(3)/mcL 0.02   Immature Grans (Abs) 0 - 0.04 x10(3)/mcL 0.02   Vitamin B12 213 - 816 pg/mL 183 (L)   Sodium 136 - 145 mmol/L 139   Potassium 3.5 - 5.1 mmol/L 4.1   Chloride 98 - 107 mmol/L 106   CO2 22 - 29 mmol/L 23   BUN 7.0 - 18.7 mg/dL 7.9   Creatinine 0.55 - 1.02 mg/dL 0.75   eGFR mls/min/1.73/m2 >60   Glucose 74 - 100 mg/dL 98   Calcium 8.4 - 10.2 mg/dL 8.8   ALP 40 - 150 unit/L 80   PROTEIN TOTAL 6.4 - 8.3 gm/dL 7.1   Albumin 3.5 - 5.0 g/dL 3.6   Albumin/Globulin Ratio 1.1 - 2.0 ratio 1.0 (L)   BILIRUBIN TOTAL <=1.5 mg/dL 0.3   AST 5 - 34 unit/L 16   ALT 0 - 55 unit/L 10   Globulin, Total 2.4 - 3.5 gm/dL 3.5   Cholesterol Total <=200 mg/dL 186   HDL 35 - 60 mg/dL 67 (H)   Total Cholesterol/HDL Ratio 0 - 5  3   Triglycerides 37 - 140 mg/dL 67   LDL Cholesterol 50.00 - 140.00 mg/dL 106.00   Very Low Density Lipoprotein  13   Vitamin D 30.0 - 80.0 ng/mL 29.2 (L)   Hemoglobin A1C External <=7.0 % 5.0   Estimated Avg Glucose mg/dL 96.8   TSH 0.350 - 4.940 uIU/mL 3.281   T3, Free 1.58 - 3.91 pg/mL 3.43   Free T4 0.70 - 1.48 ng/dL 0.83   FSH mIU/mL 3.74   (H): Data is abnormally high  (L): Data is abnormally low    Assessment and Plan:     Encounter Diagnoses   Name Primary?    Malignant neoplasm of lower-inner quadrant of left breast in female, estrogen receptor positive Yes        We discussed her imaging and pathology results in detail     We discussed the need to proceed with local and systemic treatment. Her  was present for this visit.     Local Surgical Treatment options:     Breast Surgical Procedures  Breast Conservation Surgery  "(Partial Mastectomy or Lumpectomy)  Palpation-Guided - removal of breast cancer which is felt on clinical exam  Localization-Guided - required placement of a MagSeed and/or wire (if needed) in the area of concern to allow for identification during surgery. This will take place prior to surgery (usually a few days before). During surgery the MagSeed is detected with a probe and the cancer area is removed along with the MagSeed and/or previously placed clip.   Incisions are usually closed with dissolving stitches, followed by glue and Dermabond.   Mastectomy  Simple - includes removal of breast skin, subcutaneous (fat) tissue, and nipple areolar complex.   Skin-Sparing - includes removal of the breast skin while sparing enough for reconstruction. It also is involves removal of the subcutaneous (fat) tissue and nipple areolar complex.  Nipple Sparing - includes removal of all the breast tissue underneath the nipple, areola, and breast skin is removed. The tissue beneath the nipple and areola are checked for cancer. If cancer is detected, the nipple and areola are then removed.   Surgical Risk include surgical site infections, hematoma or seroma requiring operation, necrosis of nipple-areola and/or mastectomy flaps requiring debridement or hyperbaric therapy, unplanned re-operations, and delay in adjuvant treatments.    Drain placement may be necessary after mastectomy and can remain in place for greater than 10-14 day, occasionally longer.     Axillary Surgical Procedures  [facility] Lymph Node Biopsy  Technetium-99 - a clear substance injected around the nipple and areola on the day of surgery prior to surgery starting which provides a sound "road" map to the lymph nodes needing to be removed for further examination.  Blue Dye - either Methylene Blue or Isosulfan is given in the operating room and provides a color "road" map to the lymph nodes needing to be removed for further examination.  MagTrace (Superparamagnetic " oxide) - is used to assist with lymph node mapping as well.  Risk - axillary swelling related to bleeding or serous fluid, infection, nerve damage (temporary or permanent), lymphedema (5-6% risk), additional surgery related to final pathology or complications from the procedure  Axillary Lymph Node Dissection  Risk including  the above plus nerve injury which could be permanent and lymphedema risk above 20-25%     Reconstruction Procedures  Implant- based  Autologous-based  Combination  Immediate versus Delayed  Oncoplastic reduction     The pros and cons of these reconstructive methods were addressed. A second operation maybe required before the final completion of the reconstructive process. I also explained to the patient that the reconstructive options are generally by law required to be covered by insurance and would be considered part of a cancer operation and not a cosmetic operation.     Radiation Treatment Options:  Whole-Breast  Partial Breast      Systemic treatment options:  Hormone Blocker/Endocrine Therapy  Tamoxifen  Raloxifene  Aromatase Inhibitor   Letrozole (Femara)   Anastrozole (Arimidex)   Exemestane (Aromasin)     2. Chemotherapy     3. Immunomodulator & Target Therapies (such as Herceptin)     We are elected to proceed with breast conserving surgery via left breast partial mastectomy/lumpectomy with left SLNB, possible ALND.       Plan:     Problem List Items Addressed This Visit          Oncology    Malignant neoplasm of lower-inner quadrant of left breast in female, estrogen receptor positive - Primary    Current Assessment & Plan     Surgery - Left breast partial mastectomy with seed localization, left SLNB, and possible ALND  Tentative Date: 2/19/2024  Preop - CBC & CMP reviewed from 11/21/2023, UPT  Surgical instructions and information were discussed in detail  Genetics was again discussed but patient would like to hold off for now.  She also needs to stop her birth control pill.          Relevant Orders    Case Request Operating Room: MASTECTOMY, PARTIAL - LEFT, BIOPSY, LYMPH NODE, SENTINEL - LEFT, LYMPHADENECTOMY, AXILLARY - LEFT (Completed)    POCT urine pregnancy           All of questions were answered    Teri Greenberg MD  Breast Surgical Oncology     OFFICE VISIT CODING:    Non-face-to-face time included:  Yes Preparing to see the patient such as reviewing the patient record  Yes Obtaining and reviewing separately obtained history  Yes Independently interpreting results  Yes Documenting clinical information in electronic health record  No Ordering appropriate medications  Yes Ordering appropriate tests  Yes Ordering appropriate procedures (including follow-up)  Yes Referring and communicating with other health care professionals (not separately reported)  Yes Care Coordination (not separately reported)    Face-to-face time included:  Yes Performing a medically necessary appropriate history, examination, and/or evaluation  Yes Communicating results to the patient/family/caregiver  Yes Counseling and educating the patient/family/caregiver  Yes Answering patient/family/caregiver questions    Total Time: 45 minutes    Total time includes both face-to-face and non-face-to-face time personally spent by myself on the day of the visit.

## 2024-02-05 NOTE — PROGRESS NOTES
[facility] Breast Surg  Breast Surgical Oncology  Follow-Up Patient Office Visit       Referring Provider: No ref. provider found  PCP: Keyla Snowden MD   Medical Oncologist: No care team member to display   Radiation Oncologist: No care team member to display   Other Care Providers:     Chief Complaint:   Chief Complaint   Patient presents with    Follow-up     Patient reports no breast related concerns         Subjective:   Treatment History:  None       Interval History:  2024 - Brittnee Wakefield presents today for follow up after Breast MRI and surgical discussion.    HPI:  Brittnee Wakefield is a 44 y.o. female who presents on 2024 for evaluation of newly diagnosed left  breast cancer.     A detailed patient history was obtained and reviewed. She currently denies any breast issues including rashes, redness, pain, swelling, nipple discharge, or new lumps/masses.     MG breast density: Category C (heterogeneously dense)      Imagin2023 BL SC MG at [facility]- which revealed in L breast at 8 o'clock -9 o'clock middle depth, there is an irregular mass with spiculated margins and associated architectural distortion.  At 11 o'clock- 12 o'clock L breast anterior depth, there is an oval mass seen.   Additionally, there is an oval mass in the L breast upper outer quadrant middle depth.   No significant masses, calcifications, or other findings are seen in the R breast. BIRADS-0; additional imaging needed     2. 2023 L DG MG/ L US BREAST COMPLETE at [facility]- which revealed on MG:       a. At 8 o'clock middle depth, there is a persistent 0.8 cm irregular equal density mass with spiculated margins and associated architectural distortion (spanning 4.5 cm)        b. At 11 o'clock anterior depth, there is a persistent 1.0 cm oval high density mass with circumscribed margins,         c. At 1 o'clock middle depth, , there is a 0.9 cm oval equal density mass with circumscribed  margins      On L US:         a. At 8 o'clock 5 cm FN,  there is a 0.8 cm x 0.7 cm x 0.7 cm irregular avascular hypoechoic mass with spiculated margins, posterior shadowing, and associated architectural distortion           b  At 11 o'clock 3 cm FN, there is a 1.0 cm x 0.6 cm x 0.8 cm oval avascular hypoechoic mass with angular margins and posterior shadowing           c. At 1 o'clock  3 cm FN, there is a 0.9 cm x 0.4 cm x 0.8 cm parallel oval avascular hypoechoic mass with circumscribed margins   BIRADS-5 highly suspicious;biopsy recommended.    3. 1/31/2024 BL BREAST MRI- which revealed       Left Breast-        A. At 8 o'clock 5 cm FN,  there is a 1.3 x 0.9 x 0.7 cm irregular, enhancing mass with spiculated margins (biopsy proven malignancy)        B. At 1 o'clock 3 cm FN, there is a 0.9 x 0.5 x 1.0 cm oval, enhancing mass with circumscribed margins (biopsy proven benign fibroadenomatoid change and sclerosing adenosis)         C. At 11 o'clock 3 cm FN, there is a 1.0 x 0.9 x 0.8 cm oval, enhancing mass with circumscribed margins.(biopsy proven fibroadenoma/fibroadenomatoid change)         Right Breast- .no suspicious areas of enhancement or areas of architectural distortion are seen.  There is no skin thickening or nipple retraction.  No axillary or internal mammary lymphadenopathy is identified.     BIRADS-6 Known biopsy proven malignancy          Pathology:  1/16/2024 Ultrasound-guided Core Needle Biopsy Left Breast 8 o'clock 5 cm FN-        - Invasive ductal carcinoma, grade 1 (measuring 9.0 mm)        - Ductal carcinoma in situ, grade 2 without necrosis          ER 72%          MN 84%          HER 2 ezio 1+          Ki67 10%     2. 1/16/2024 Ultrasound-guided Core Needle Biopsy Left Breast 11 o'clock 3 cm FN-        - Fibroadenoma/ fibroadenomatoid change  3. 1/16/2024 Ultrasound-guided Core Needle Biopsy Left Breast 1 o'clock 3 cm FN-        - Fibroadenomatoid change and sclerosing adenosis        OB/GYN  History:  Age at Menarche Onset: 13  Menopausal Status: premenopausal, LMP: No LMP recorded. Patient is perimenopausal.  Hysterectomy/Oophorectomy: NA, at age NA  Hormonal birth control (duration): Yes OCP (estrogen/progesterone).   Pregnancy History:   Age at first live birth: 25  Hormone Replacement Therapy: No, none  Patient did not breast feed.  Patient denies nipple discharge.   Patient denies to previous breast biopsy.   Patient denies to a personal history of breast cancer.     Other Relevant History:  Prior thoracic RT: none  Genetic testing: No  Ashkenazi Shinto descent: No     Family History:  Family History   Problem Relation Age of Onset    Liver cancer Father     Hepatitis Maternal Grandmother     Breast cancer Maternal Aunt         Past History:  Past Medical History:   Diagnosis Date    Anxiety     Cancer     History of seizures     At the age of 3yo        Past Surgical History:   Procedure Laterality Date     SECTION          Social History     Socioeconomic History    Marital status:    Tobacco Use    Smoking status: Former     Types: Cigarettes    Smokeless tobacco: Never   Substance and Sexual Activity    Alcohol use: Not Currently    Drug use: Never        Body mass index is 37.08 kg/m².     Allergy/Medications:   Review of patient's allergies indicates:  No Known Allergies       Current Outpatient Medications:     aspirin (ECOTRIN) 81 MG EC tablet, Take 81 mg by mouth once daily., Disp: , Rfl:     diphenhydrAMINE (BENADRYL) 25 mg capsule, Take 25 mg by mouth every 6 (six) hours as needed for Itching., Disp: , Rfl:     FLUoxetine 20 MG capsule, Take 1 capsule (20 mg total) by mouth once daily., Disp: 90 capsule, Rfl: 3    NORTREL 1/35, 28, 1-35 mg-mcg per tablet, Take 1 tablet by mouth once daily., Disp: 30 tablet, Rfl: 5    ondansetron (ZOFRAN) 4 MG tablet, Take 1 tablet (4 mg total) by mouth every 6 (six) hours., Disp: 12 tablet, Rfl: 0       Review of  "Systems:  ROS        Objective:     Vitals:  Blood pressure 114/70, pulse 72, temperature 97.9 °F (36.6 °C), temperature source Oral, resp. rate 18, height 5' 4" (1.626 m), weight 98 kg (216 lb), SpO2 96 %.      Physical Exam:  General: The patient is awake, alert and oriented times three. The patient is well nourished and in no acute distress.   Neck: There is no evidence of palpable cervical, supraclavicular or axillary adenopathy. The neck is supple. The thyroid is not enlarged.   Musculoskeletal: The patient has a normal range of motion of her bilateral upper extremities.   Chest: Examination of the chest wall fails to reveal any obvious abnormalities. The lungs are clear to auscultation bilaterally without rales, rhonchi, or wheezing.   Cardiovascular: The heart has a regular rate and rhythm without murmurs, gallops or rubs.   Breast:   Right: Examination of right breast fails to reveal any dominant masses or areas of significant focal nodularity. The nipple is everted without evidence of discharge. There is no skin dimpling with movement of the pectoralis. There is no significant skin changes overlying the breast.   Left: Examination of the left breast fails to reveal any dominant masses or areas of significant focal nodularity. The nipple is everted without evidence of discharge. There is no skin dimpling with movement of the pectoralis. There is no significant skin changes overlying the breast.  Abdomen: The abdomen is soft, flat, nontender and nondistended with no palpable masses or organomegaly.  Integumentary: no rashes or skin lesions present  Neurologic: cranial nerves intact, no signs of peripheral neurological deficit, motor/sensory function intact     Latest Reference Range & Units 11/21/23 10:55   WBC 4.50 - 11.50 x10(3)/mcL 8.33   RBC 4.20 - 5.40 x10(6)/mcL 4.66   Hemoglobin 12.0 - 16.0 g/dL 14.1   Hematocrit 37.0 - 47.0 % 44.6   MCV 80.0 - 94.0 fL 95.7 (H)   MCH 27.0 - 31.0 pg 30.3   MCHC 33.0 - " 36.0 g/dL 31.6 (L)   RDW 11.5 - 17.0 % 12.6   Platelet Count 130 - 400 x10(3)/mcL 383   MPV 7.4 - 10.4 fL 9.8   Neut % % 58.6   LYMPH % % 33.0   Mono % % 6.1   Eos % % 1.9   Basophil % % 0.2   Immature Granulocytes % 0.2   Neut # 2.1 - 9.2 x10(3)/mcL 4.87   Lymph # 0.6 - 4.6 x10(3)/mcL 2.75   Mono # 0.1 - 1.3 x10(3)/mcL 0.51   Eos # 0 - 0.9 x10(3)/mcL 0.16   Baso # <=0.2 x10(3)/mcL 0.02   Immature Grans (Abs) 0 - 0.04 x10(3)/mcL 0.02   Vitamin B12 213 - 816 pg/mL 183 (L)   Sodium 136 - 145 mmol/L 139   Potassium 3.5 - 5.1 mmol/L 4.1   Chloride 98 - 107 mmol/L 106   CO2 22 - 29 mmol/L 23   BUN 7.0 - 18.7 mg/dL 7.9   Creatinine 0.55 - 1.02 mg/dL 0.75   eGFR mls/min/1.73/m2 >60   Glucose 74 - 100 mg/dL 98   Calcium 8.4 - 10.2 mg/dL 8.8   ALP 40 - 150 unit/L 80   PROTEIN TOTAL 6.4 - 8.3 gm/dL 7.1   Albumin 3.5 - 5.0 g/dL 3.6   Albumin/Globulin Ratio 1.1 - 2.0 ratio 1.0 (L)   BILIRUBIN TOTAL <=1.5 mg/dL 0.3   AST 5 - 34 unit/L 16   ALT 0 - 55 unit/L 10   Globulin, Total 2.4 - 3.5 gm/dL 3.5   Cholesterol Total <=200 mg/dL 186   HDL 35 - 60 mg/dL 67 (H)   Total Cholesterol/HDL Ratio 0 - 5  3   Triglycerides 37 - 140 mg/dL 67   LDL Cholesterol 50.00 - 140.00 mg/dL 106.00   Very Low Density Lipoprotein  13   Vitamin D 30.0 - 80.0 ng/mL 29.2 (L)   Hemoglobin A1C External <=7.0 % 5.0   Estimated Avg Glucose mg/dL 96.8   TSH 0.350 - 4.940 uIU/mL 3.281   T3, Free 1.58 - 3.91 pg/mL 3.43   Free T4 0.70 - 1.48 ng/dL 0.83   FSH mIU/mL 3.74   (H): Data is abnormally high  (L): Data is abnormally low    Assessment and Plan:     Encounter Diagnoses   Name Primary?    Malignant neoplasm of lower-inner quadrant of left breast in female, estrogen receptor positive Yes        We discussed her imaging and pathology results in detail     We discussed the need to proceed with local and systemic treatment. Her  was present for this visit.     Local Surgical Treatment options:     Breast Surgical Procedures  Breast Conservation Surgery  "(Partial Mastectomy or Lumpectomy)  Palpation-Guided - removal of breast cancer which is felt on clinical exam  Localization-Guided - required placement of a MagSeed and/or wire (if needed) in the area of concern to allow for identification during surgery. This will take place prior to surgery (usually a few days before). During surgery the MagSeed is detected with a probe and the cancer area is removed along with the MagSeed and/or previously placed clip.   Incisions are usually closed with dissolving stitches, followed by glue and Dermabond.   Mastectomy  Simple - includes removal of breast skin, subcutaneous (fat) tissue, and nipple areolar complex.   Skin-Sparing - includes removal of the breast skin while sparing enough for reconstruction. It also is involves removal of the subcutaneous (fat) tissue and nipple areolar complex.  Nipple Sparing - includes removal of all the breast tissue underneath the nipple, areola, and breast skin is removed. The tissue beneath the nipple and areola are checked for cancer. If cancer is detected, the nipple and areola are then removed.   Surgical Risk include surgical site infections, hematoma or seroma requiring operation, necrosis of nipple-areola and/or mastectomy flaps requiring debridement or hyperbaric therapy, unplanned re-operations, and delay in adjuvant treatments.    Drain placement may be necessary after mastectomy and can remain in place for greater than 10-14 day, occasionally longer.     Axillary Surgical Procedures  [place] Lymph Node Biopsy  Technetium-99 - a clear substance injected around the nipple and areola on the day of surgery prior to surgery starting which provides a sound "road" map to the lymph nodes needing to be removed for further examination.  Blue Dye - either Methylene Blue or Isosulfan is given in the operating room and provides a color "road" map to the lymph nodes needing to be removed for further examination.  MagTrace (Superparamagnetic " oxide) - is used to assist with lymph node mapping as well.  Risk - axillary swelling related to bleeding or serous fluid, infection, nerve damage (temporary or permanent), lymphedema (5-6% risk), additional surgery related to final pathology or complications from the procedure  Axillary Lymph Node Dissection  Risk including  the above plus nerve injury which could be permanent and lymphedema risk above 20-25%     Reconstruction Procedures  Implant- based  Autologous-based  Combination  Immediate versus Delayed  Oncoplastic reduction     The pros and cons of these reconstructive methods were addressed. A second operation maybe required before the final completion of the reconstructive process. I also explained to the patient that the reconstructive options are generally by law required to be covered by insurance and would be considered part of a cancer operation and not a cosmetic operation.     Radiation Treatment Options:  Whole-Breast  Partial Breast      Systemic treatment options:  Hormone Blocker/Endocrine Therapy  Tamoxifen  Raloxifene  Aromatase Inhibitor   Letrozole (Femara)   Anastrozole (Arimidex)   Exemestane (Aromasin)     2. Chemotherapy     3. Immunomodulator & Target Therapies (such as Herceptin)     We are elected to proceed with breast conserving surgery via left breast partial mastectomy/lumpectomy with left SLNB, possible ALND.       Plan:     Problem List Items Addressed This Visit          Oncology    Malignant neoplasm of lower-inner quadrant of left breast in female, estrogen receptor positive - Primary    Current Assessment & Plan     Surgery - Left breast partial mastectomy with seed localization, left SLNB, and possible ALND  Tentative Date: 2/19/2024  Preop - CBC & CMP reviewed from 11/21/2023, UPT  Surgical instructions and information were discussed in detail  Genetics was again discussed but patient would like to hold off for now.  She also needs to stop her birth control pill.          Relevant Orders    Case Request Operating Room: MASTECTOMY, PARTIAL - LEFT, BIOPSY, LYMPH NODE, SENTINEL - LEFT, LYMPHADENECTOMY, AXILLARY - LEFT (Completed)    POCT urine pregnancy           All of questions were answered    Teri Greenberg MD  Breast Surgical Oncology     OFFICE VISIT CODING:    Non-face-to-face time included:  Yes Preparing to see the patient such as reviewing the patient record  Yes Obtaining and reviewing separately obtained history  Yes Independently interpreting results  Yes Documenting clinical information in electronic health record  No Ordering appropriate medications  Yes Ordering appropriate tests  Yes Ordering appropriate procedures (including follow-up)  Yes Referring and communicating with other health care professionals (not separately reported)  Yes Care Coordination (not separately reported)    Face-to-face time included:  Yes Performing a medically necessary appropriate history, examination, and/or evaluation  Yes Communicating results to the patient/family/caregiver  Yes Counseling and educating the patient/family/caregiver  Yes Answering patient/family/caregiver questions    Total Time: 45 minutes    Total time includes both face-to-face and non-face-to-face time personally spent by myself on the day of the visit.

## 2024-02-06 ENCOUNTER — OFFICE VISIT (OUTPATIENT)
Dept: SURGERY | Facility: CLINIC | Age: 44
End: 2024-02-06
Payer: COMMERCIAL

## 2024-02-06 VITALS
OXYGEN SATURATION: 96 % | WEIGHT: 216 LBS | BODY MASS INDEX: 36.88 KG/M2 | HEIGHT: 64 IN | DIASTOLIC BLOOD PRESSURE: 70 MMHG | TEMPERATURE: 98 F | HEART RATE: 72 BPM | RESPIRATION RATE: 18 BRPM | SYSTOLIC BLOOD PRESSURE: 114 MMHG

## 2024-02-06 DIAGNOSIS — Z17.0 MALIGNANT NEOPLASM OF LOWER-INNER QUADRANT OF LEFT BREAST IN FEMALE, ESTROGEN RECEPTOR POSITIVE: Primary | ICD-10-CM

## 2024-02-06 DIAGNOSIS — C50.312 MALIGNANT NEOPLASM OF LOWER-INNER QUADRANT OF LEFT BREAST IN FEMALE, ESTROGEN RECEPTOR POSITIVE: Primary | ICD-10-CM

## 2024-02-06 PROCEDURE — 1160F RVW MEDS BY RX/DR IN RCRD: CPT | Mod: CPTII,S$GLB,, | Performed by: SURGERY

## 2024-02-06 PROCEDURE — 3074F SYST BP LT 130 MM HG: CPT | Mod: CPTII,S$GLB,, | Performed by: SURGERY

## 2024-02-06 PROCEDURE — 1159F MED LIST DOCD IN RCRD: CPT | Mod: CPTII,S$GLB,, | Performed by: SURGERY

## 2024-02-06 PROCEDURE — 3008F BODY MASS INDEX DOCD: CPT | Mod: CPTII,S$GLB,, | Performed by: SURGERY

## 2024-02-06 PROCEDURE — 99215 OFFICE O/P EST HI 40 MIN: CPT | Mod: S$GLB,,, | Performed by: SURGERY

## 2024-02-06 PROCEDURE — 99999 PR PBB SHADOW E&M-EST. PATIENT-LVL V: CPT | Mod: PBBFAC,,, | Performed by: SURGERY

## 2024-02-06 PROCEDURE — 3078F DIAST BP <80 MM HG: CPT | Mod: CPTII,S$GLB,, | Performed by: SURGERY

## 2024-02-06 NOTE — ASSESSMENT & PLAN NOTE
Surgery - Left breast partial mastectomy with seed localization, left SLNB, and possible ALND  Tentative Date: 2/19/2024  Preop - CBC & CMP reviewed from 11/21/2023, UPT  Surgical instructions and information were discussed in detail  Genetics was again discussed but patient would like to hold off for now.  She also needs to stop her birth control pill.

## 2024-02-07 RX ORDER — SODIUM CHLORIDE, SODIUM LACTATE, POTASSIUM CHLORIDE, CALCIUM CHLORIDE 600; 310; 30; 20 MG/100ML; MG/100ML; MG/100ML; MG/100ML
INJECTION, SOLUTION INTRAVENOUS CONTINUOUS
Status: CANCELLED | OUTPATIENT
Start: 2024-02-07

## 2024-02-15 ENCOUNTER — HOSPITAL ENCOUNTER (OUTPATIENT)
Dept: RADIOLOGY | Facility: HOSPITAL | Age: 44
Discharge: HOME | End: 2024-02-15
Attending: SURGERY
Payer: COMMERCIAL

## 2024-02-15 ENCOUNTER — LAB VISIT (OUTPATIENT)
Dept: LAB | Facility: HOSPITAL | Age: 44
End: 2024-02-15
Attending: SURGERY
Payer: COMMERCIAL

## 2024-02-15 DIAGNOSIS — Z17.0 MALIGNANT NEOPLASM OF LOWER-INNER QUADRANT OF LEFT BREAST IN FEMALE, ESTROGEN RECEPTOR POSITIVE: Primary | ICD-10-CM

## 2024-02-15 DIAGNOSIS — C50.312 MALIGNANT NEOPLASM OF LOWER-INNER QUADRANT OF LEFT BREAST IN FEMALE, ESTROGEN RECEPTOR POSITIVE: Primary | ICD-10-CM

## 2024-02-15 DIAGNOSIS — C50.312 CARCINOMA OF LOWER-INNER QUADRANT OF BREAST, LEFT: ICD-10-CM

## 2024-02-15 DIAGNOSIS — Z17.0 MALIGNANT NEOPLASM OF LOWER-INNER QUADRANT OF LEFT BREAST IN FEMALE, ESTROGEN RECEPTOR POSITIVE: ICD-10-CM

## 2024-02-15 DIAGNOSIS — C50.312 MALIGNANT NEOPLASM OF LOWER-INNER QUADRANT OF LEFT BREAST IN FEMALE, ESTROGEN RECEPTOR POSITIVE: ICD-10-CM

## 2024-02-15 LAB
ALBUMIN SERPL-MCNC: 4 G/DL (ref 3.5–5)
ALBUMIN/GLOB SERPL: 1.2 RATIO (ref 1.1–2)
ALP SERPL-CCNC: 83 UNIT/L (ref 40–150)
ALT SERPL-CCNC: 16 UNIT/L (ref 0–55)
AST SERPL-CCNC: 20 UNIT/L (ref 5–34)
BASOPHILS # BLD AUTO: 0.03 X10(3)/MCL
BASOPHILS NFR BLD AUTO: 0.4 %
BILIRUB SERPL-MCNC: 0.4 MG/DL
BUN SERPL-MCNC: 10.5 MG/DL (ref 7–18.7)
CALCIUM SERPL-MCNC: 9.5 MG/DL (ref 8.4–10.2)
CHLORIDE SERPL-SCNC: 104 MMOL/L (ref 98–107)
CO2 SERPL-SCNC: 30 MMOL/L (ref 22–29)
CREAT SERPL-MCNC: 0.79 MG/DL (ref 0.55–1.02)
EOSINOPHIL # BLD AUTO: 0.13 X10(3)/MCL (ref 0–0.9)
EOSINOPHIL NFR BLD AUTO: 1.7 %
ERYTHROCYTE [DISTWIDTH] IN BLOOD BY AUTOMATED COUNT: 12.7 % (ref 11.5–17)
GFR SERPLBLD CREATININE-BSD FMLA CKD-EPI: >60 MLS/MIN/1.73/M2
GLOBULIN SER-MCNC: 3.4 GM/DL (ref 2.4–3.5)
GLUCOSE SERPL-MCNC: 94 MG/DL (ref 74–100)
HCT VFR BLD AUTO: 45.4 % (ref 37–47)
HGB BLD-MCNC: 14.5 G/DL (ref 12–16)
IMM GRANULOCYTES # BLD AUTO: 0.02 X10(3)/MCL (ref 0–0.04)
IMM GRANULOCYTES NFR BLD AUTO: 0.3 %
LYMPHOCYTES # BLD AUTO: 2.31 X10(3)/MCL (ref 0.6–4.6)
LYMPHOCYTES NFR BLD AUTO: 29.4 %
MCH RBC QN AUTO: 30.9 PG (ref 27–31)
MCHC RBC AUTO-ENTMCNC: 31.9 G/DL (ref 33–36)
MCV RBC AUTO: 96.6 FL (ref 80–94)
MONOCYTES # BLD AUTO: 0.54 X10(3)/MCL (ref 0.1–1.3)
MONOCYTES NFR BLD AUTO: 6.9 %
NEUTROPHILS # BLD AUTO: 4.83 X10(3)/MCL (ref 2.1–9.2)
NEUTROPHILS NFR BLD AUTO: 61.3 %
NRBC BLD AUTO-RTO: 0 %
PLATELET # BLD AUTO: 401 X10(3)/MCL (ref 130–400)
PMV BLD AUTO: 10.4 FL (ref 7.4–10.4)
POTASSIUM SERPL-SCNC: 4.3 MMOL/L (ref 3.5–5.1)
PROT SERPL-MCNC: 7.4 GM/DL (ref 6.4–8.3)
RBC # BLD AUTO: 4.7 X10(6)/MCL (ref 4.2–5.4)
SODIUM SERPL-SCNC: 141 MMOL/L (ref 136–145)
WBC # SPEC AUTO: 7.86 X10(3)/MCL (ref 4.5–11.5)

## 2024-02-15 PROCEDURE — 77061 BREAST TOMOSYNTHESIS UNI: CPT | Mod: TC,LT

## 2024-02-15 PROCEDURE — 77065 DX MAMMO INCL CAD UNI: CPT | Mod: 26,LT,, | Performed by: STUDENT IN AN ORGANIZED HEALTH CARE EDUCATION/TRAINING PROGRAM

## 2024-02-15 PROCEDURE — A4648 IMPLANTABLE TISSUE MARKER: HCPCS

## 2024-02-15 PROCEDURE — 19285 PERQ DEV BREAST 1ST US IMAG: CPT | Mod: LT,,, | Performed by: STUDENT IN AN ORGANIZED HEALTH CARE EDUCATION/TRAINING PROGRAM

## 2024-02-15 PROCEDURE — 93010 ELECTROCARDIOGRAM REPORT: CPT | Mod: ,,, | Performed by: INTERNAL MEDICINE

## 2024-02-15 PROCEDURE — 36415 COLL VENOUS BLD VENIPUNCTURE: CPT

## 2024-02-15 PROCEDURE — 77065 DX MAMMO INCL CAD UNI: CPT | Mod: TC,LT

## 2024-02-15 PROCEDURE — 77061 BREAST TOMOSYNTHESIS UNI: CPT | Mod: 26,LT,, | Performed by: STUDENT IN AN ORGANIZED HEALTH CARE EDUCATION/TRAINING PROGRAM

## 2024-02-15 PROCEDURE — 93005 ELECTROCARDIOGRAM TRACING: CPT

## 2024-02-16 ENCOUNTER — PATIENT MESSAGE (OUTPATIENT)
Dept: ADMINISTRATIVE | Facility: OTHER | Age: 44
End: 2024-02-16
Payer: COMMERCIAL

## 2024-02-16 LAB
OHS QRS DURATION: 78 MS
OHS QTC CALCULATION: 418 MS

## 2024-02-17 ENCOUNTER — PATIENT MESSAGE (OUTPATIENT)
Dept: ADMINISTRATIVE | Facility: OTHER | Age: 44
End: 2024-02-17
Payer: COMMERCIAL

## 2024-02-18 ENCOUNTER — ANESTHESIA EVENT (OUTPATIENT)
Dept: SURGERY | Facility: HOSPITAL | Age: 44
End: 2024-02-18
Payer: COMMERCIAL

## 2024-02-18 ENCOUNTER — PATIENT MESSAGE (OUTPATIENT)
Dept: ADMINISTRATIVE | Facility: OTHER | Age: 44
End: 2024-02-18
Payer: COMMERCIAL

## 2024-02-19 ENCOUNTER — HOSPITAL ENCOUNTER (OUTPATIENT)
Facility: HOSPITAL | Age: 44
Discharge: HOME | End: 2024-02-19
Attending: SURGERY | Admitting: SURGERY
Payer: COMMERCIAL

## 2024-02-19 ENCOUNTER — ANESTHESIA (OUTPATIENT)
Dept: SURGERY | Facility: HOSPITAL | Age: 44
End: 2024-02-19
Payer: COMMERCIAL

## 2024-02-19 ENCOUNTER — HOSPITAL ENCOUNTER (OUTPATIENT)
Dept: RADIOLOGY | Facility: HOSPITAL | Age: 44
Discharge: HOME | End: 2024-02-19
Attending: SURGERY | Admitting: SURGERY
Payer: COMMERCIAL

## 2024-02-19 ENCOUNTER — HOSPITAL ENCOUNTER (OUTPATIENT)
Dept: RADIOLOGY | Facility: HOSPITAL | Age: 44
Discharge: HOME | End: 2024-02-19
Attending: SURGERY
Payer: COMMERCIAL

## 2024-02-19 DIAGNOSIS — Z90.12 S/P PARTIAL MASTECTOMY, LEFT: Primary | ICD-10-CM

## 2024-02-19 DIAGNOSIS — C50.312 MALIGNANT NEOPLASM OF LOWER-INNER QUADRANT OF LEFT BREAST IN FEMALE, ESTROGEN RECEPTOR POSITIVE: ICD-10-CM

## 2024-02-19 DIAGNOSIS — R92.8 ABNORMAL MAMMOGRAM: ICD-10-CM

## 2024-02-19 DIAGNOSIS — C50.919 BREAST CANCER: ICD-10-CM

## 2024-02-19 DIAGNOSIS — Z17.0 MALIGNANT NEOPLASM OF LOWER-INNER QUADRANT OF LEFT BREAST IN FEMALE, ESTROGEN RECEPTOR POSITIVE: ICD-10-CM

## 2024-02-19 LAB
B-HCG UR QL: NEGATIVE
CTP QC/QA: YES

## 2024-02-19 PROCEDURE — 38792 RA TRACER ID OF SENTINL NODE: CPT | Mod: XE,LT,, | Performed by: SURGERY

## 2024-02-19 PROCEDURE — 63600175 PHARM REV CODE 636 W HCPCS: Performed by: ANESTHESIOLOGY

## 2024-02-19 PROCEDURE — 37000008 HC ANESTHESIA 1ST 15 MINUTES: Performed by: SURGERY

## 2024-02-19 PROCEDURE — 63600175 PHARM REV CODE 636 W HCPCS: Performed by: SURGERY

## 2024-02-19 PROCEDURE — 19301 PARTIAL MASTECTOMY: CPT | Mod: LT,,, | Performed by: SURGERY

## 2024-02-19 PROCEDURE — 25000003 PHARM REV CODE 250: Performed by: NURSE ANESTHETIST, CERTIFIED REGISTERED

## 2024-02-19 PROCEDURE — 37000009 HC ANESTHESIA EA ADD 15 MINS: Performed by: SURGERY

## 2024-02-19 PROCEDURE — 63600175 PHARM REV CODE 636 W HCPCS: Performed by: NURSE ANESTHETIST, CERTIFIED REGISTERED

## 2024-02-19 PROCEDURE — C1819 TISSUE LOCALIZATION-EXCISION: HCPCS | Performed by: SURGERY

## 2024-02-19 PROCEDURE — 36000706: Performed by: SURGERY

## 2024-02-19 PROCEDURE — 38792 RA TRACER ID OF SENTINL NODE: CPT | Mod: TC

## 2024-02-19 PROCEDURE — 36000707: Performed by: SURGERY

## 2024-02-19 PROCEDURE — 38525 BIOPSY/REMOVAL LYMPH NODES: CPT | Mod: 51,LT,, | Performed by: SURGERY

## 2024-02-19 PROCEDURE — 71000033 HC RECOVERY, INTIAL HOUR: Performed by: SURGERY

## 2024-02-19 PROCEDURE — 71000016 HC POSTOP RECOV ADDL HR: Performed by: SURGERY

## 2024-02-19 PROCEDURE — 76098 X-RAY EXAM SURGICAL SPECIMEN: CPT | Mod: 26,,, | Performed by: STUDENT IN AN ORGANIZED HEALTH CARE EDUCATION/TRAINING PROGRAM

## 2024-02-19 PROCEDURE — D9220A PRA ANESTHESIA: Mod: ,,, | Performed by: NURSE ANESTHETIST, CERTIFIED REGISTERED

## 2024-02-19 PROCEDURE — 19301 PARTIAL MASTECTOMY: CPT | Mod: AS,LT,, | Performed by: PHYSICIAN ASSISTANT

## 2024-02-19 PROCEDURE — 63600175 PHARM REV CODE 636 W HCPCS: Mod: JG | Performed by: SURGERY

## 2024-02-19 PROCEDURE — 71000015 HC POSTOP RECOV 1ST HR: Performed by: SURGERY

## 2024-02-19 PROCEDURE — 27201423 OPTIME MED/SURG SUP & DEVICES STERILE SUPPLY: Performed by: SURGERY

## 2024-02-19 PROCEDURE — 81025 URINE PREGNANCY TEST: CPT | Performed by: SURGERY

## 2024-02-19 PROCEDURE — 38900 IO MAP OF SENT LYMPH NODE: CPT | Mod: LT,,, | Performed by: SURGERY

## 2024-02-19 PROCEDURE — 76098 X-RAY EXAM SURGICAL SPECIMEN: CPT | Mod: TC

## 2024-02-19 RX ORDER — TRAMADOL HYDROCHLORIDE 50 MG/1
50 TABLET ORAL EVERY 4 HOURS PRN
Status: DISCONTINUED | OUTPATIENT
Start: 2024-02-19 | End: 2024-02-19 | Stop reason: HOSPADM

## 2024-02-19 RX ORDER — SODIUM CHLORIDE, SODIUM GLUCONATE, SODIUM ACETATE, POTASSIUM CHLORIDE AND MAGNESIUM CHLORIDE 30; 37; 368; 526; 502 MG/100ML; MG/100ML; MG/100ML; MG/100ML; MG/100ML
INJECTION, SOLUTION INTRAVENOUS CONTINUOUS
Status: DISCONTINUED | OUTPATIENT
Start: 2024-02-19 | End: 2024-02-19 | Stop reason: HOSPADM

## 2024-02-19 RX ORDER — FENTANYL CITRATE 50 UG/ML
INJECTION, SOLUTION INTRAMUSCULAR; INTRAVENOUS
Status: DISCONTINUED | OUTPATIENT
Start: 2024-02-19 | End: 2024-02-19

## 2024-02-19 RX ORDER — SODIUM CHLORIDE 9 MG/ML
INJECTION, SOLUTION INTRAVENOUS CONTINUOUS
Status: DISCONTINUED | OUTPATIENT
Start: 2024-02-19 | End: 2024-02-19 | Stop reason: HOSPADM

## 2024-02-19 RX ORDER — ONDANSETRON HYDROCHLORIDE 2 MG/ML
4 INJECTION, SOLUTION INTRAVENOUS ONCE
Status: DISCONTINUED | OUTPATIENT
Start: 2024-02-19 | End: 2024-02-19 | Stop reason: HOSPADM

## 2024-02-19 RX ORDER — ISOSULFAN BLUE 50 MG/5ML
INJECTION, SOLUTION SUBCUTANEOUS
Status: DISCONTINUED
Start: 2024-02-19 | End: 2024-02-19 | Stop reason: HOSPADM

## 2024-02-19 RX ORDER — TRAMADOL HYDROCHLORIDE 50 MG/1
50 TABLET ORAL EVERY 6 HOURS PRN
Qty: 28 TABLET | Refills: 0 | Status: SHIPPED | OUTPATIENT
Start: 2024-02-19 | End: 2024-02-26

## 2024-02-19 RX ORDER — ROCURONIUM BROMIDE 10 MG/ML
INJECTION, SOLUTION INTRAVENOUS
Status: DISCONTINUED | OUTPATIENT
Start: 2024-02-19 | End: 2024-02-19

## 2024-02-19 RX ORDER — MIDAZOLAM HYDROCHLORIDE 1 MG/ML
2 INJECTION INTRAMUSCULAR; INTRAVENOUS ONCE AS NEEDED
Status: COMPLETED | OUTPATIENT
Start: 2024-02-19 | End: 2024-02-19

## 2024-02-19 RX ORDER — ACETAMINOPHEN 10 MG/ML
INJECTION, SOLUTION INTRAVENOUS
Status: DISCONTINUED | OUTPATIENT
Start: 2024-02-19 | End: 2024-02-19

## 2024-02-19 RX ORDER — DEXAMETHASONE SODIUM PHOSPHATE 4 MG/ML
INJECTION, SOLUTION INTRA-ARTICULAR; INTRALESIONAL; INTRAMUSCULAR; INTRAVENOUS; SOFT TISSUE
Status: DISCONTINUED | OUTPATIENT
Start: 2024-02-19 | End: 2024-02-19

## 2024-02-19 RX ORDER — LIDOCAINE HYDROCHLORIDE 10 MG/ML
INJECTION, SOLUTION EPIDURAL; INFILTRATION; INTRACAUDAL; PERINEURAL
Status: DISCONTINUED | OUTPATIENT
Start: 2024-02-19 | End: 2024-02-19

## 2024-02-19 RX ORDER — ONDANSETRON HYDROCHLORIDE 2 MG/ML
INJECTION, SOLUTION INTRAMUSCULAR; INTRAVENOUS
Status: DISCONTINUED | OUTPATIENT
Start: 2024-02-19 | End: 2024-02-19

## 2024-02-19 RX ORDER — CEFAZOLIN SODIUM 1 G/3ML
INJECTION, POWDER, FOR SOLUTION INTRAMUSCULAR; INTRAVENOUS
Status: DISCONTINUED
Start: 2024-02-19 | End: 2024-02-19 | Stop reason: HOSPADM

## 2024-02-19 RX ORDER — PROPOFOL 10 MG/ML
VIAL (ML) INTRAVENOUS
Status: DISCONTINUED | OUTPATIENT
Start: 2024-02-19 | End: 2024-02-19

## 2024-02-19 RX ORDER — HYDROMORPHONE HYDROCHLORIDE 2 MG/ML
0.4 INJECTION, SOLUTION INTRAMUSCULAR; INTRAVENOUS; SUBCUTANEOUS EVERY 5 MIN PRN
Status: DISCONTINUED | OUTPATIENT
Start: 2024-02-19 | End: 2024-02-19 | Stop reason: HOSPADM

## 2024-02-19 RX ORDER — SODIUM CHLORIDE, SODIUM LACTATE, POTASSIUM CHLORIDE, CALCIUM CHLORIDE 600; 310; 30; 20 MG/100ML; MG/100ML; MG/100ML; MG/100ML
INJECTION, SOLUTION INTRAVENOUS CONTINUOUS
Status: DISCONTINUED | OUTPATIENT
Start: 2024-02-19 | End: 2024-02-19 | Stop reason: HOSPADM

## 2024-02-19 RX ORDER — BUPIVACAINE HYDROCHLORIDE 5 MG/ML
INJECTION, SOLUTION EPIDURAL; INTRACAUDAL
Status: DISCONTINUED | OUTPATIENT
Start: 2024-02-19 | End: 2024-02-19 | Stop reason: HOSPADM

## 2024-02-19 RX ORDER — MEPERIDINE HYDROCHLORIDE 25 MG/ML
12.5 INJECTION INTRAMUSCULAR; INTRAVENOUS; SUBCUTANEOUS ONCE
Status: DISCONTINUED | OUTPATIENT
Start: 2024-02-19 | End: 2024-02-19 | Stop reason: HOSPADM

## 2024-02-19 RX ORDER — BUPIVACAINE HYDROCHLORIDE 5 MG/ML
INJECTION, SOLUTION EPIDURAL; INTRACAUDAL
Status: DISCONTINUED
Start: 2024-02-19 | End: 2024-02-19 | Stop reason: HOSPADM

## 2024-02-19 RX ORDER — CEFAZOLIN SODIUM 1 G/3ML
2 INJECTION, POWDER, FOR SOLUTION INTRAMUSCULAR; INTRAVENOUS
Status: DISCONTINUED | OUTPATIENT
Start: 2024-02-19 | End: 2024-02-19 | Stop reason: HOSPADM

## 2024-02-19 RX ORDER — ONDANSETRON HYDROCHLORIDE 2 MG/ML
4 INJECTION, SOLUTION INTRAVENOUS EVERY 12 HOURS PRN
Status: DISCONTINUED | OUTPATIENT
Start: 2024-02-19 | End: 2024-02-19 | Stop reason: HOSPADM

## 2024-02-19 RX ORDER — METHOCARBAMOL 100 MG/ML
1000 INJECTION, SOLUTION INTRAMUSCULAR; INTRAVENOUS ONCE AS NEEDED
Status: COMPLETED | OUTPATIENT
Start: 2024-02-19 | End: 2024-02-19

## 2024-02-19 RX ORDER — ISOSULFAN BLUE 50 MG/5ML
INJECTION, SOLUTION SUBCUTANEOUS
Status: DISCONTINUED | OUTPATIENT
Start: 2024-02-19 | End: 2024-02-19 | Stop reason: HOSPADM

## 2024-02-19 RX ADMIN — CEFAZOLIN 2 G: 330 INJECTION, POWDER, FOR SOLUTION INTRAMUSCULAR; INTRAVENOUS at 07:02

## 2024-02-19 RX ADMIN — MIDAZOLAM HYDROCHLORIDE 2 MG: 1 INJECTION, SOLUTION INTRAMUSCULAR; INTRAVENOUS at 07:02

## 2024-02-19 RX ADMIN — ROCURONIUM BROMIDE 40 MG: 50 INJECTION INTRAVENOUS at 07:02

## 2024-02-19 RX ADMIN — METHOCARBAMOL 1000 MG: 100 INJECTION INTRAMUSCULAR; INTRAVENOUS at 08:02

## 2024-02-19 RX ADMIN — FENTANYL CITRATE 50 MCG: 50 INJECTION, SOLUTION INTRAMUSCULAR; INTRAVENOUS at 07:02

## 2024-02-19 RX ADMIN — SODIUM CHLORIDE, POTASSIUM CHLORIDE, SODIUM LACTATE AND CALCIUM CHLORIDE: 600; 310; 30; 20 INJECTION, SOLUTION INTRAVENOUS at 07:02

## 2024-02-19 RX ADMIN — HYDROMORPHONE HYDROCHLORIDE 0.4 MG: 2 INJECTION INTRAMUSCULAR; INTRAVENOUS; SUBCUTANEOUS at 09:02

## 2024-02-19 RX ADMIN — DEXAMETHASONE SODIUM PHOSPHATE 4 MG: 4 INJECTION, SOLUTION INTRA-ARTICULAR; INTRALESIONAL; INTRAMUSCULAR; INTRAVENOUS; SOFT TISSUE at 07:02

## 2024-02-19 RX ADMIN — PROPOFOL 175 MG: 10 INJECTION, EMULSION INTRAVENOUS at 07:02

## 2024-02-19 RX ADMIN — SUGAMMADEX 200 MG: 100 INJECTION, SOLUTION INTRAVENOUS at 08:02

## 2024-02-19 RX ADMIN — LIDOCAINE HYDROCHLORIDE 50 MG: 10 INJECTION, SOLUTION EPIDURAL; INFILTRATION; INTRACAUDAL; PERINEURAL at 07:02

## 2024-02-19 RX ADMIN — ONDANSETRON 4 MG: 2 INJECTION INTRAMUSCULAR; INTRAVENOUS at 08:02

## 2024-02-19 RX ADMIN — ACETAMINOPHEN 1000 MG: 10 INJECTION, SOLUTION INTRAVENOUS at 07:02

## 2024-02-19 NOTE — ANESTHESIA POSTPROCEDURE EVALUATION
Anesthesia Post Evaluation    Patient: Brittnee Wakefield    Procedure(s) Performed: Procedure(s) (LRB):  MASTECTOMY, PARTIAL - LEFT Need Sentimag Need Faxitron Need sterile charms and sterile ink (Left)  BIOPSY, LYMPH NODE, SENTINEL - LEFT  need nuc med need marco antonio node (Left)    Final Anesthesia Type: general      Patient location during evaluation: PACU  Patient participation: No - Unable to Participate, Sedation  Level of consciousness: sedated  Post-procedure vital signs: reviewed and stable  Pain management: adequate  Airway patency: patent  JOSÉ LUIS mitigation strategies: Multimodal analgesia  PONV status at discharge: No PONV  Anesthetic complications: no      Cardiovascular status: stable  Respiratory status: nasal cannula  Hydration status: euvolemic  Follow-up not needed.              Vitals Value Taken Time   /83 02/19/24 0706   Temp 36.8 °C (98.2 °F) 02/19/24 0706   Pulse 66 02/19/24 0706   Resp 18 02/19/24 0658   SpO2 97 % 02/19/24 0706         No case tracking events are documented in the log.      Pain/Nawaf Score: No data recorded

## 2024-02-19 NOTE — BRIEF OP NOTE
[facility]  Brief Operative Note     SUMMARY     Surgery Date: 2/19/2024     Surgeon: Teri Greenberg MD    Assist: Sofia Hauser PA-C    Pre-op Diagnosis:  Malignant neoplasm of lower-inner quadrant of left breast in female, estrogen receptor positive [C50.312, Z17.0]    Post-op Diagnosis:  Post-Op Diagnosis Codes:     * Malignant neoplasm of lower-inner quadrant of left breast in female, estrogen receptor positive [C50.312, Z17.0]    Procedure(s) (LRB):  MASTECTOMY, PARTIAL - LEFT Need Sentimag Need Faxitron Need sterile charms and sterile ink (Left)  BIOPSY, LYMPH NODE, SENTINEL - LEFT  need nuc med need marco antonio node (Left)    Anesthesia: General    Findings/Key Components: Removal of left breast biopsy-proven malignancy and left axillary sentinel lymph nodes    Estimated Blood Loss: 4 ml         Specimens:   Specimen (24h ago, onward)       Start     Ordered    02/19/24 0801  Specimen to Pathology  RELEASE UPON ORDERING        References:    Click here for ordering Quick Tip   Question:  Release to patient  Answer:  Immediate    02/19/24 0801                  ID Type Source Tests Collected by Time Destination   A : LEFT BREAST PARTIAL MASTECTOMY (INK AND CHARMS ERASMO MARGINS) Tissue Breast, Left SPECIMEN TO PATHOLOGY Teri Greenberg MD 2/19/2024 0739    B : LEFT AXILLARY SENTINEL NODE #1( HOT AND BLUE @1886) Tissue Lymph Node SPECIMEN TO PATHOLOGY Teri Greenberg MD 2/19/2024 0802    C : LEFT AXILLARY SENTINEL NODE #2( HOT AND BLUE @673) Tissue Lymph Node SPECIMEN TO PATHOLOGY Teri Greenberg MD 2/19/2024 0802    D : LEFT AXILLARY SENTINEL NODE #3( HOT AND BLUE @674) Tissue Lymph Node SPECIMEN TO PATHOLOGY Teri Greenberg MD 2/19/2024 0805        Discharge Note    SUMMARY     Admit Date: 2/19/2024    Discharge Date and Time:  02/19/2024 8:27 AM    Hospital Course (synopsis of major diagnoses, care, treatment, and services provided during the course of the hospital stay): She  is status post left breast partial mastectomy/lumpectomy with left sentinel lymph node biopsy     Final Diagnosis: Post-Op Diagnosis Codes:     * Malignant neoplasm of lower-inner quadrant of left breast in female, estrogen receptor positive [C50.312, Z17.0]    Disposition: Home or Self Care    Follow Up/Patient Instructions:     Medications:  Reconciled Home Medications:      Medication List        ASK your doctor about these medications      diphenhydrAMINE 25 mg capsule  Commonly known as: BENADRYL  Take 25 mg by mouth every 6 (six) hours as needed for Itching.     FLUoxetine 20 MG capsule  Take 1 capsule (20 mg total) by mouth once daily.     NORTREL 1/35 (28) 1-35 mg-mcg per tablet  Generic drug: norethindrone-ethinyl estradiol  Take 1 tablet by mouth once daily.     ondansetron 4 MG tablet  Commonly known as: ZOFRAN  Take 1 tablet (4 mg total) by mouth every 6 (six) hours.            No discharge procedures on file.

## 2024-02-19 NOTE — OP NOTE
DATE OF PROCEDURE: 2/19/2024    SURGEON: Teri Greenberg M.D.    ASSISTANT:  Sofia Hauser PA-C    PREOPERATIVE DIAGNOSIS: Malignant neoplasm of lower-inner quadrant of left breast in female, estrogen receptor positive [C50.312, Z17.0]     POSTOPERATIVE DIAGNOSIS: Post-Op Diagnosis Codes:     * Malignant neoplasm of lower-inner quadrant of left breast in female, estrogen receptor positive [C50.312, Z17.0]     ANESTHESIA: General Anesthesiologist: Fernandez Vaughn MD  CRNA: Curly Sexton CRNA     PROCEDURES PERFORMED:   1. Left breast MagSeed localization partial mastectomy (lumpectomy) with excision for clear margins   2. Left axillary deep sentinel lymph node biopsy   3. Injection of left breast with isosulfan Lymphazurin blue dye intra-operatively and technetium-labeled radiocolloid for sentinel lymph node identification pre-operatively  4. Identification of left axillary sentinel lymph node       PROCEDURE IN DETAIL:   Brittnee Wakefield is a 44 y.o. female brought to the operating room for definitive surgical management of left breast cancer. The patient has elected to undergo breast conservation surgery with partial mastectomy with sentinel lymph node biopsy for evon assessment. The patient was informed of the possible risks and complications of the procedure, including but not limited to anesthetic risks, bleeding, infection, and need for additional surgery.  The patient concurred with the proposed plan, and has given informed consent.  The site of surgery was properly noted/marked in the preoperative holding area.    The patient underwent informed consent. The MagSeed was placed in the lower inner quadrant of the left breast. The MagSeed localization films were reviewed.    The left breast was injected in the subareolar region with the technetium-labeled radiocolloid.     She was then brought to the Operating Room and placed in the supine position. Anesthesia was administered. The left breast was  further injected with the isosulfan Lymphazurin blue dye in the central subareolar region. The left breast, anterior chest, arm and axilla were then prepped and draped in a sterile fashion.     Partial mastectomy was performed.  Attention was turned to the left breast itself. A circumareolar incision was made to allow access to the lower inner quadrant of the left breast over the anticipated tract of the seed. The specimen was dissected circumferentially around the cancer. The dissection was carried out circumferentially to include the seed. The dissection was carried down to the pectoralis fascia, leaving the fascia intact. The specimen was completely dissected free. The seed localized partial mastectomy specimen was inked on the back table using green ink inferiorly, blue ink superiorly, orange ink laterally, yellow ink anteriorly, black ink posteriorly, and the red ink medially.  It was then fixed with acetic acid and submitted for specimen radiograph to document adequate margins. Specimen radiograph confirmed the seed, clip and area of interest were within the specimen. Given the appearance and location of the lesion, no additional margins were taken including.       Within the lumpectomy cavity, hemostasis was achieved with cautery. The cavity was irrigated until clear. There was no evidence of bleeding. Clips were placed in the cavity to catalina the cavity for radiation.      Left [place] Lymph Node Biopsy was then performed  Using the hand-held gamma probe (TruNPronutria) , activity was noted and localized in the left axilla. A small transverse inferior axillary incision over the area of activity. Dissection was carried down through the clavipectoral fascia using electrocautery. The lateral border of the pectoralis of the left axilla was excised and the axilla was entered. Using a hand-held gamma probe (TruNode)  the axilla was assessed for a sentinel lymph node. A Blue afferent lymphatic channel coming from the  breast upper outer quadrant to level 1 axillary lymph tissue. [place] lymph nodes were identified as follows:  Left axillary sentinel lymph node #1, Radiotracer count: 1886 and Isosulfan dye identified  Left axillary sentinel lymph node #2, Radiotracer count: 673 and Isosulfan dye identified  Left axillary sentinel lymph node #3, Radiotracer count: 674 and Isosulfan dye identified      The substances used for sentinel lymph node biopsy in this patient: Radiotracer and Isosulfan blue dye  Number of lymph nodes removed with MagTrace: 0  Number of lymph nodes removed with Isosulfan (or Methylene) blue dye and Radiotracer: 3  Number of lymph nodes removed with only Radiotracer signal: 0  Number of lymph nodes removed with only Isosulfan (or Methylene) blue dye: 0  Number of lymph nodes removed with only palpable: 0    The sentinel lymph nodes were not sent to Pathologist for intra-operative Frozen Section and permanent pathology review will be performed.    No further dissection was undertaken.    Within the axillary cavity, hemostasis was achieved with cautery. The cavity was irrigated until clear. There was no evidence of bleeding. Both cavities were closed with interrupted 3-0 Monocryl sutures. The subdermal layers were closed with 3-0 Monocryl and 4-0 subcuticular running skin closures. Dermabond was applied followed by sterile dressings.    All instruments and sponge counts were correct at the end of the procedure.     Significant Surgical Tasks Conducted by the Assistant(s), if Applicable: The skilled assistance of the Physician Assistant, Sofia Hauser PA-C, was necessary for the successful completion of this case. She was essential for proper positioning of the patient, manipulation of instruments, proper exposure, manipulation of tissue, and wound closure.       ESTIMATED BLOOD LOSS: *4 ml*    Implants: * No implants in log *    Specimens:   Specimen (24h ago, onward)       Start     Ordered    02/19/24  0801  Specimen to Pathology  RELEASE UPON ORDERING        References:    Click here for ordering Quick Tip   Question:  Release to patient  Answer:  Immediate    02/19/24 0801                   ID Type Source Tests Collected by Time Destination   A : LEFT BREAST PARTIAL MASTECTOMY (INK AND CHARMS ERASMO MARGINS) Tissue Breast, Left SPECIMEN TO PATHOLOGY Teri Greenberg MD 2/19/2024 0739    B : LEFT AXILLARY SENTINEL NODE #1( HOT AND BLUE @1886) Tissue Lymph Node SPECIMEN TO PATHOLOGY Teri Greenberg MD 2/19/2024 0802    C : LEFT AXILLARY SENTINEL NODE #2( HOT AND BLUE @673) Tissue Lymph Node SPECIMEN TO PATHOLOGY Teri Greenberg MD 2/19/2024 0802    D : LEFT AXILLARY SENTINEL NODE #3( HOT AND BLUE @674) Tissue Lymph Node SPECIMEN TO PATHOLOGY Teri Greenberg MD 2/19/2024 0805               Condition: Good    Disposition: PACU - hemodynamically stable.    Attestation: I performed the procedure.    [place] Node Biopsy for Breast Cancer - Left  Operation performed with curative intent Yes   Tracer(s) used to identify sentinel nodes in the upfront surgery (non-neoadjuvant) setting Dye and Radioactive tracer   Tracer(s) used to identify sentinel nodes in the neoadjuvant setting N/A   All nodes (colored or non-colored) present at the end of a dye-filled lymphatic channel were removed Yes   All significantly radioactive nodes were removed Yes   All palpably suspicious nodes were removed Yes   Biopsy-proven positive nodes marked with clips prior to chemotherapy were identified and removed N/A

## 2024-02-19 NOTE — ANESTHESIA PREPROCEDURE EVALUATION
"                                                                                                             2024  Brittnee Wakefield is a 44 y.o., female.  Pre-operative evaluation for Procedure(s) (LRB):  MASTECTOMY, PARTIAL - LEFT Need Sentimag Need Faxitron Need sterile charms and sterile ink (Left)  BIOPSY, LYMPH NODE, SENTINEL - LEFT  need nuc med need marco antonio node (Left)  LYMPHADENECTOMY, AXILLARY - LEFT (Left)    Resp 18   Ht 5' 4" (1.626 m)   Wt 98.5 kg (217 lb 2.5 oz)   LMP 2024   Breastfeeding No   BMI 37.27 kg/m²     Past Medical History:   Diagnosis Date    Anxiety     Cancer     History of seizures     At the age of 1yo       Patient Active Problem List   Diagnosis    Generalized anxiety disorder    Obesity    Malignant neoplasm of lower-inner quadrant of left breast in female, estrogen receptor positive       Review of patient's allergies indicates:   Allergen Reactions    Adhesive      Skin tear       Current Outpatient Medications   Medication Instructions    aspirin (ECOTRIN) 81 mg, Oral, Daily    diphenhydrAMINE (BENADRYL) 25 mg, Oral, Every 6 hours PRN    FLUoxetine 20 mg, Oral, Daily    NORTREL 1/35, 28, 1-35 mg-mcg per tablet 1 tablet, Oral, Daily    ondansetron (ZOFRAN) 4 mg, Oral, Every 6 hours       Past Surgical History:   Procedure Laterality Date     SECTION           Lab Results   Component Value Date    WBC 7.86 02/15/2024    HGB 14.5 02/15/2024    HCT 45.4 02/15/2024    MCV 96.6 (H) 02/15/2024     (H) 02/15/2024          BMP  Lab Results   Component Value Date     02/15/2024    K 4.3 02/15/2024    CHLORIDE 104 02/15/2024    CO2 30 (H) 02/15/2024    GLUCOSE 94 02/15/2024    BUN 10.5 02/15/2024    CREATININE 0.79 02/15/2024    CALCIUM 9.5 02/15/2024    EGFRNONAA >60 10/05/2019        INR  No results for input(s): "PT", "INR", "PROTIME", "APTT" in the last 72 hours.        Diagnostic Studies:      EKG:  Results for orders placed or performed in visit on " 02/15/24   EKG 12-lead    Collection Time: 02/15/24 10:38 AM   Result Value Ref Range    QRS Duration 78 ms    [facility] QTC Calculation 418 ms    Narrative    Test Reason : C50.312,Z17.0,    Vent. Rate : 064 BPM     Atrial Rate : 064 BPM     P-R Int : 140 ms          QRS Dur : 078 ms      QT Int : 406 ms       P-R-T Axes : 033 060 041 degrees     QTc Int : 418 ms    Normal sinus rhythm  Normal ECG  No previous ECGs available  Confirmed by Bear Sam MD ([id number]) on 2/16/2024 8:48:33 AM    Referred By: KITTY HERNANDEZ           Confirmed By:Bear Sam MD       No results found for this or any previous visit.            Pre-op Assessment    I have reviewed the Patient Summary Reports.    I have reviewed the NPO Status.   I have reviewed the Medications.     Review of Systems  Anesthesia Hx:  No problems with previous Anesthesia             Denies Family Hx of Anesthesia complications.    Denies Personal Hx of Anesthesia complications.                    Cardiovascular:  Cardiovascular Normal                   No Cardiac Complaints                         Pulmonary:  Pulmonary Normal        No Pulmonary Complaints               Hepatic/GI:        No Current GERD Sx              Physical Exam  General: Alert and Oriented    Airway:  Mallampati: II   Mouth Opening: Normal  TM Distance: Normal  Tongue: Normal  Neck ROM: Normal ROM    Dental:  Intact    Chest/Lungs:  Clear to auscultation, Normal Respiratory Rate    Heart:  Rate: Normal  Rhythm: Regular Rhythm        Anesthesia Plan  Type of Anesthesia, risks & benefits discussed:    Anesthesia Type: Gen ETT  Intra-op Monitoring Plan: Standard ASA Monitors  Post Op Pain Control Plan: multimodal analgesia  Induction:  IV and Inhalation  Airway Plan: Direct, Post-Induction  Informed Consent: Informed consent signed with the Patient and all parties understand the risks and agree with anesthesia plan.  All questions answered.   ASA Score: 2  Day of Surgery Review of History &  Physical: H&P Update referred to the surgeon/provider.  Anesthesia Plan Notes: Discussed Anesthetic Plan w/ Pt/Family. Questions Entertained. Accepted.    Ready For Surgery From Anesthesia Perspective.     .

## 2024-02-19 NOTE — ANESTHESIA PROCEDURE NOTES
Intubation    Date/Time: 2/19/2024 7:19 AM    Performed by: Curly Sexton CRNA  Authorized by: Fernandez Vaughn MD    Intubation:     Induction:  Intravenous    Intubated:  Postinduction    Mask Ventilation:  Easy mask    Attempts:  1    Attempted By:  CRNA    Method of Intubation:  Direct    Blade:  Pillai 2    Laryngeal View Grade: Grade IIA - cords partially seen      Difficult Airway Encountered?: No      Complications:  None    Airway Device:  Oral endotracheal tube    Airway Device Size:  6.5    Style/Cuff Inflation:  Cuffed    Tube secured:  21    Secured at:  The lips    Placement Verified By:  Capnometry    Complicating Factors:  None    Findings Post-Intubation:  BS equal bilateral and atraumatic/condition of teeth unchanged

## 2024-02-19 NOTE — DISCHARGE INSTRUCTIONS
BREAST SURGERY POST-OP INSTRUCTIONS  DR. KITTY STOKES PA-C     How do I care for my incisions?  You and your caregiver should look at your incision daily. Call your doctor if you see any redness or drainage from your incision.  You will be given a support bra, wear this for 24 hours a day (unless showering or sponge bathing) for comfort and to help decrease amount of swelling. If the bra fits too loose or too tight you may go to your local store a purchase a front opening sports bra that fits snug.   Your incision will be closed with sutures (stitches) under your skin. These sutures dissolve on their own, so they do not need to be removed.  · If you go home with Steri-StripsTM on your incision, they will loosen and fall off by themselves. If they havent fallen off within 14 days, you may remove them.  · If you go home with glue over your sutures (stitches), it will also loosen and peel off, similarly to the Steri-Strips.  ** If you have had a Mastectomy and/or Reconstruction and/or Axillary Lymph Node Dissection:  You may have 1-2 Darrian-Woody Drains in place. Please refer to the additional instructions discussing care of your drains.     Is it normal to feel new sensations?  As you are healing, you may feel a several different sensations in your breast. Tenderness, numbness, and twinges are common examples. These sensations usually come and go, and will lessen over time, usually within the first few months after surgery. As you continue to heal, you may feel scar tissue along your incision site. It will feel hard. This is common and will soften over the next several months.     Can I shower?  You can shower 24 hours after your surgery. Taking a warm shower is relaxing and can help decrease discomfort. Use soap when you shower and gently wash your incision. Pat the areas dry with a towel after showering, and leave your incision uncovered, unless you have drainage from your incision. If you  have drainage, call your doctors office.  Do not take tub baths, swim, or use hot tubs or saunas until you discuss it with your doctor at the first appointment after your surgery.    Will I have pain when I am home?  The length of time each person has pain or discomfort varies. You will be given a prescription for pain medication before you go home. Follow the guidelines below to manage your pain.  · Take your medication as directed and as needed.  · Icing the affected area can also alleviate pain symptoms (ice the affected area at least four times a day, 15-20 minutes at a time).  · Call your doctor if the pain medication prescribed for you doesnt relieve your pain.  · Do not drive or drink alcohol while you are taking prescription pain medication.  · As your incision heals, you will have less pain and need less pain medication. A mild pain reliever such as acetaminophen (Tylenol) or ibuprofen (Advil) will relieve aches and discomfort. However, large quantities of acetaminophen may be harmful to your liver. Do not take more acetaminophen than the amount directed on the bottle or as instructed by your doctor or nurse.  · Pain medication should help you as you resume your normal activities. Pain medication is most effective 30 to 45 minutes after taking it.  · Keep track of when you take your pain medication. Taking it when your pain first begins is more effective than waiting for the pain to get worse.  Pain medication may cause constipation (having fewer bowel movements than what is normal for you).    How can I prevent constipation?  · Go to the bathroom at the same time every day. Your body will get used to going at that time.  · If you feel the urge to go, do not put it off. Try to use the bathroom 5 to 15 minutes after meals.  · After breakfast is a good time to move your bowels because the reflexes in your colon are strongest then.  · Exercise if you can; walking is an excellent form of exercise.  · Drink 8  (8-ounce) glasses (2 liters) of liquids daily, if you can. Drink water, juices, soups, ice cream shakes, and other drinks that do not have caffeine. Beverages with caffeine, such as coffee and soda, pull fluid out of the body.  · Slowly increase the fiber in your diet to 25 to 35 grams per day. Fruits, vegetables, whole grains, and cereals contain fiber. If you have an ostomy or have had recent bowel surgery, check with your doctor or nurse before making any changes in your diet.  · Both over-the-counter and prescription medications are available to treat constipation. Start with 1 of the following over-the-counter medications first:  o Docusate sodium (Colace®) 100 mg. Take __1___ capsules __1___ time a day. This is a stool softener that causes few side effects. Do not take it with mineral oil.  o Polyethylene glycol (MiraLAX®) 17 grams daily.  o Senna (Senokot®) 2 tablets at bedtime. This is a stimulant laxative, which can cause cramping.  · If you havent had a bowel movement in 2 days, call your doctor or nurse.    Will I be able to eat?  You can resume eating when you go home after surgery. Eating a balanced diet high in protein will help you heal after surgery. Your diet should include a healthy protein source at each meal, as well as fruits, vegetables, and whole grains. If you have questions about your diet, ask to see a dietitian.    When is it safe for me to drive and what activities can I perform?  You may resume driving after surgery as long as you are not taking prescription pain medication that may make you drowsy, and you have your full range of motion.  You should not lift anything heavier than 10-15 lbs the first week. After this time, you may gradually increase the amount of weight. You want to take it easy the first 2 weeks, no strenuous or repetitive movements such as vacuuming or scrubbing. Walking is okay. Ask the doctor if you have questions.    How long until I have the pathology  results?  The pathology report usually takes to 7 to 10 business days.    When is my first appointment after my surgery?  You should be given or schedule a follow-up appointment 1 to 2 weeks after your surgery.    How can I cope with my feelings?   After surgery for a serious illness, you may have new and upsetting feelings. Many patients say they felt sad, worried, nervous, irritable, or angry at one time or another. You may find that you cannot control some of these feelings. If this happens, its a good idea to seek emotional support.  The first step in coping is to talk about how you feel. Family and friends can help. Your nurse, doctor, and  can reassure, support, and guide you. It is always a good idea to let these professionals know how you, your family, and your friends are feeling emotionally. Many resources are available to patients and their families. Whether you are in the hospital or at home, we are here to help you and your family and friends handle the emotional aspects of your illness.    What if I have other questions?  If you have any questions or concerns, please talk with your doctor or nurse. You can reach them Monday through Thursday from 9:00 AM to 5:00 PM and Friday from 9:00 AM to 12:00 PM at [phone].  After 5:00 PM M-Th or 12:00 PM Fri, during the weekend, and on holidays, please call [phone] and ask for the doctor on call.    PLEASE CALL YOUR DOCTOR OR NURSE IF YOU HAVE:  · A temperature of 101° F (38.3° C) or higher  · Shortness of breath  · Warmer than normal skin around your incision  · Increased discomfort in the area  · Increased redness around your incision  · New or increased swelling around your incision  · Discharge from your incision

## 2024-02-19 NOTE — PLAN OF CARE
Pt is aaox4-vss-pain improved and tolerable-zeke score 9/10-she meets criteria for phase2 care per dr christina-to rm 1 via [facility] cristal isabel

## 2024-02-20 ENCOUNTER — PATIENT MESSAGE (OUTPATIENT)
Dept: ADMINISTRATIVE | Facility: OTHER | Age: 44
End: 2024-02-20
Payer: COMMERCIAL

## 2024-02-20 VITALS
SYSTOLIC BLOOD PRESSURE: 111 MMHG | HEART RATE: 90 BPM | HEIGHT: 64 IN | WEIGHT: 217.13 LBS | BODY MASS INDEX: 37.07 KG/M2 | RESPIRATION RATE: 18 BRPM | TEMPERATURE: 99 F | DIASTOLIC BLOOD PRESSURE: 75 MMHG | OXYGEN SATURATION: 96 %

## 2024-02-21 ENCOUNTER — PATIENT MESSAGE (OUTPATIENT)
Dept: ADMINISTRATIVE | Facility: OTHER | Age: 44
End: 2024-02-21
Payer: COMMERCIAL

## 2024-02-22 ENCOUNTER — TELEPHONE (OUTPATIENT)
Dept: SURGERY | Facility: CLINIC | Age: 44
End: 2024-02-22
Payer: COMMERCIAL

## 2024-02-22 ENCOUNTER — DOCUMENTATION ONLY (OUTPATIENT)
Dept: SURGERY | Facility: CLINIC | Age: 44
End: 2024-02-22
Payer: COMMERCIAL

## 2024-02-22 NOTE — TELEPHONE ENCOUNTER
Patient called with her pathology results.          ----- Message from Teri Greenberg MD sent at 2/22/2024  9:14 AM CST -----  Please call the patient and inform pathology results revealed the cancer was completely removed and all lymph nodes were negative for cancer. Further discussion will be had at their post-op/follow-up appointment.    Cristian - please send Oncotype Dx

## 2024-02-26 NOTE — PROGRESS NOTES
[facility] Breast Surg  Breast Surgical Oncology  Follow-Up Patient Office Visit       Referring Provider: No ref. provider found  PCP: Keyla Snowden MD   Medical Oncologist: No care team member to display   Radiation Oncologist: No care team member to display   Other Care Providers:     Chief Complaint:   Chief Complaint   Patient presents with    Post-op Evaluation     Patient is for a Post-OP Eval         Subjective:   Treatment History:  2024 Left Lumpectomy with Left SLNB       Interval History:  2024 - Brittnee Wakefield presents today for her post op appointment. She is doing well today. Post-op course was uneventful.     HPI:  Brittnee Wakefield is a 44 y.o. female who presents on 2024 for evaluation of newly diagnosed left  breast cancer.     A detailed patient history was obtained and reviewed. She currently denies any breast issues including rashes, redness, pain, swelling, nipple discharge, or new lumps/masses.     MG breast density: Category C (heterogeneously dense)      Imagin2023 BL SC MG at [facility]- which revealed in L breast at 8 o'clock -9 o'clock middle depth, there is an irregular mass with spiculated margins and associated architectural distortion.  At 11 o'clock- 12 o'clock L breast anterior depth, there is an oval mass seen.   Additionally, there is an oval mass in the L breast upper outer quadrant middle depth.   No significant masses, calcifications, or other findings are seen in the R breast. BIRADS-0; additional imaging needed     2. 2023 L DG MG/ L US BREAST COMPLETE at [facility]- which revealed on MG:       a. At 8 o'clock middle depth, there is a persistent 0.8 cm irregular equal density mass with spiculated margins and associated architectural distortion (spanning 4.5 cm)        b. At 11 o'clock anterior depth, there is a persistent 1.0 cm oval high density mass with circumscribed margins,         c. At 1 o'clock middle depth, ,  there is a 0.9 cm oval equal density mass with circumscribed margins      On L US:         a. At 8 o'clock 5 cm FN,  there is a 0.8 cm x 0.7 cm x 0.7 cm irregular avascular hypoechoic mass with spiculated margins, posterior shadowing, and associated architectural distortion           b  At 11 o'clock 3 cm FN, there is a 1.0 cm x 0.6 cm x 0.8 cm oval avascular hypoechoic mass with angular margins and posterior shadowing           c. At 1 o'clock  3 cm FN, there is a 0.9 cm x 0.4 cm x 0.8 cm parallel oval avascular hypoechoic mass with circumscribed margins   BIRADS-5 highly suspicious;biopsy recommended.    3. 1/31/2024 BL BREAST MRI- which revealed       Left Breast-        A. At 8 o'clock 5 cm FN,  there is a 1.3 x 0.9 x 0.7 cm irregular, enhancing mass with spiculated margins (biopsy proven malignancy)        B. At 1 o'clock 3 cm FN, there is a 0.9 x 0.5 x 1.0 cm oval, enhancing mass with circumscribed margins (biopsy proven benign fibroadenomatoid change and sclerosing adenosis)         C. At 11 o'clock 3 cm FN, there is a 1.0 x 0.9 x 0.8 cm oval, enhancing mass with circumscribed margins.(biopsy proven fibroadenoma/fibroadenomatoid change)         Right Breast- .no suspicious areas of enhancement or areas of architectural distortion are seen.  There is no skin thickening or nipple retraction.  No axillary or internal mammary lymphadenopathy is identified.     BIRADS-6 Known biopsy proven malignancy          Pathology:  1/16/2024 Ultrasound-guided Core Needle Biopsy Left Breast 8 o'clock 5 cm FN-        - Invasive ductal carcinoma, grade 1 (measuring 9.0 mm)        - Ductal carcinoma in situ, grade 2 without necrosis          ER 72%          VA 84%          HER 2 ezio 1+          Ki67 10%     2. 1/16/2024 Ultrasound-guided Core Needle Biopsy Left Breast 11 o'clock 3 cm FN-        - Fibroadenoma/ fibroadenomatoid change  3. 1/16/2024 Ultrasound-guided Core Needle Biopsy Left Breast 1 o'clock 3 cm FN-        -  Fibroadenomatoid change and sclerosing adenosis  4. 2024 Left Lumpectomy with Left [place] Lymph Node Biopsy which revealed invasive ductal carcinoma, grade 1 (measuring 0.9 cm); and lobular carcinoma in situ.  All margins clear.  Three sentinel lymph nodes negative for metastaic carcinoma.        OB/GYN History:  Age at Menarche Onset: 13  Menopausal Status: premenopausal, LMP: No LMP recorded. Patient is perimenopausal.  Hysterectomy/Oophorectomy: NA, at age NA  Hormonal birth control (duration): Yes OCP (estrogen/progesterone).   Pregnancy History:   Age at first live birth: 25  Hormone Replacement Therapy: No, none  Patient did not breast feed.  Patient denies nipple discharge.   Patient denies to previous breast biopsy.   Patient denies to a personal history of breast cancer.     Other Relevant History:  Prior thoracic RT: none  Genetic testing: No  Ashkenazi Restorationism descent: No     Family History:  Family History   Problem Relation Age of Onset    Liver cancer Father     Hepatitis Maternal Grandmother     Breast cancer Maternal Aunt         Past History:  Past Medical History:   Diagnosis Date    Anxiety     Cancer     breast left    History of seizures     At the age of 3yo        Past Surgical History:   Procedure Laterality Date     SECTION      MASTECTOMY, PARTIAL Left 2024    Procedure: MASTECTOMY, PARTIAL - LEFT Need Sentimag Need Faxitron Need sterile charms and sterile ink;  Surgeon: Teri Greenberg MD;  Location: [facility];  Service: General;  Laterality: Left;  Need Sentimag  Need Faxitron  Need sterile charms and sterile ink    SENTINEL LYMPH NODE BIOPSY Left 2024    Procedure: BIOPSY, LYMPH NODE, SENTINEL - LEFT  need nuc med need marco antonio node;  Surgeon: Teri Greenberg MD;  Location: [facility];  Service: General;  Laterality: Left;  need nuc med  need marco antonio node        Social History     Socioeconomic History    Marital status:    Tobacco Use    Smoking status: Former  "    Types: Cigarettes    Smokeless tobacco: Never   Substance and Sexual Activity    Alcohol use: Not Currently    Drug use: Never    Sexual activity: Yes     Partners: Male        Body mass index is 36.56 kg/m².     Allergy/Medications:   Review of patient's allergies indicates:   Allergen Reactions    Adhesive      Skin tear          Current Outpatient Medications:     diphenhydrAMINE (BENADRYL) 25 mg capsule, Take 25 mg by mouth every 6 (six) hours as needed for Itching., Disp: , Rfl:     FLUoxetine 20 MG capsule, Take 1 capsule (20 mg total) by mouth once daily., Disp: 90 capsule, Rfl: 3    NORTREL 1/35, 28, 1-35 mg-mcg per tablet, Take 1 tablet by mouth once daily. (Patient not taking: Reported on 2/29/2024), Disp: 30 tablet, Rfl: 5    ondansetron (ZOFRAN) 4 MG tablet, Take 1 tablet (4 mg total) by mouth every 6 (six) hours. (Patient not taking: Reported on 2/29/2024), Disp: 12 tablet, Rfl: 0       Review of Systems:  Review of Systems   All other systems reviewed and are negative.          Objective:     Vitals:  Blood pressure 108/77, pulse 71, temperature 97.6 °F (36.4 °C), temperature source Oral, resp. rate 18, height 5' 4" (1.626 m), weight 96.6 kg (213 lb), last menstrual period 02/08/2024, SpO2 97 %.      Physical Exam:  General: The patient is awake, alert and oriented times three. The patient is well nourished and in no acute distress.   Musculoskeletal: The patient has a normal range of motion of her bilateral upper extremities.   Breast:   Left: Examination of the left breast reveals a well healing circumareolar incision and left axillary incision. Minimal swelling.  Integumentary: no rashes or skin lesions present  Neurologic: cranial nerves intact, no signs of peripheral neurological deficit, motor/sensory function intact      Assessment and Plan:     Encounter Diagnoses   Name Primary?    Malignant neoplasm of lower-inner quadrant of left breast in female, estrogen receptor positive Yes    " Lobular carcinoma in situ (LCIS) of right breast     Status post partial mastectomy of left breast     Status post lymph node biopsy     Personal history of breast cancer           We discussed her pathology results in detail.    We discussed her next steps and follow-up as well.         Plan:     Problem List Items Addressed This Visit          Oncology    Malignant neoplasm of lower-inner quadrant of left breast in female, estrogen receptor positive - Primary    Current Assessment & Plan     Medical Oncology Referral - re: adjuvant therapy, Send Oncotype Dx  Radiation Oncology Referral - re: post-lumpectomy radiation  Clinical follow-up in 3 months  BL DG MG - due Dec 2024  Could benefit from high-risk breast MRI - would recommend starting in May 2025         Relevant Orders    Ambulatory referral/consult to Hematology / Oncology    Ambulatory referral/consult to Radiation Oncology    Lobular carcinoma in situ (LCIS) of right breast     Other Visit Diagnoses       Status post partial mastectomy of left breast        Status post lymph node biopsy        Personal history of breast cancer        Relevant Orders    Mammo Digital Diagnostic Bilat with [facility]                  All of questions were answered    Teri Greenberg MD  Breast Surgical Oncology     OFFICE VISIT CODING:  Post-Op Visit

## 2024-02-29 ENCOUNTER — OFFICE VISIT (OUTPATIENT)
Dept: SURGERY | Facility: CLINIC | Age: 44
End: 2024-02-29
Payer: COMMERCIAL

## 2024-02-29 VITALS
DIASTOLIC BLOOD PRESSURE: 77 MMHG | SYSTOLIC BLOOD PRESSURE: 108 MMHG | OXYGEN SATURATION: 97 % | HEIGHT: 64 IN | TEMPERATURE: 98 F | HEART RATE: 71 BPM | WEIGHT: 213 LBS | BODY MASS INDEX: 36.37 KG/M2 | RESPIRATION RATE: 18 BRPM

## 2024-02-29 DIAGNOSIS — C50.312 MALIGNANT NEOPLASM OF LOWER-INNER QUADRANT OF LEFT BREAST IN FEMALE, ESTROGEN RECEPTOR POSITIVE: Primary | ICD-10-CM

## 2024-02-29 DIAGNOSIS — Z90.12 STATUS POST PARTIAL MASTECTOMY OF LEFT BREAST: ICD-10-CM

## 2024-02-29 DIAGNOSIS — Z17.0 MALIGNANT NEOPLASM OF LOWER-INNER QUADRANT OF LEFT BREAST IN FEMALE, ESTROGEN RECEPTOR POSITIVE: Primary | ICD-10-CM

## 2024-02-29 DIAGNOSIS — Z85.3 PERSONAL HISTORY OF BREAST CANCER: ICD-10-CM

## 2024-02-29 DIAGNOSIS — Z98.890 STATUS POST LYMPH NODE BIOPSY: ICD-10-CM

## 2024-02-29 DIAGNOSIS — D05.01 LOBULAR CARCINOMA IN SITU (LCIS) OF RIGHT BREAST: ICD-10-CM

## 2024-02-29 PROCEDURE — 1160F RVW MEDS BY RX/DR IN RCRD: CPT | Mod: CPTII,S$GLB,, | Performed by: SURGERY

## 2024-02-29 PROCEDURE — 99024 POSTOP FOLLOW-UP VISIT: CPT | Mod: S$GLB,,, | Performed by: SURGERY

## 2024-02-29 PROCEDURE — 99999 PR PBB SHADOW E&M-EST. PATIENT-LVL IV: CPT | Mod: PBBFAC,,, | Performed by: SURGERY

## 2024-02-29 PROCEDURE — 1159F MED LIST DOCD IN RCRD: CPT | Mod: CPTII,S$GLB,, | Performed by: SURGERY

## 2024-02-29 PROCEDURE — 3078F DIAST BP <80 MM HG: CPT | Mod: CPTII,S$GLB,, | Performed by: SURGERY

## 2024-02-29 PROCEDURE — 3074F SYST BP LT 130 MM HG: CPT | Mod: CPTII,S$GLB,, | Performed by: SURGERY

## 2024-02-29 NOTE — ASSESSMENT & PLAN NOTE
Medical Oncology Referral - re: adjuvant therapy, Send Oncotype Dx  Radiation Oncology Referral - re: post-lumpectomy radiation  Clinical follow-up in 3 months  BL DG MG - due Dec 2024  Could benefit from high-risk breast MRI - would recommend starting in May 2025

## 2024-03-03 PROBLEM — D05.01 LOBULAR CARCINOMA IN SITU (LCIS) OF RIGHT BREAST: Status: ACTIVE | Noted: 2024-03-03

## 2024-03-05 ENCOUNTER — TUMOR BOARD CONFERENCE (OUTPATIENT)
Dept: SURGERY | Facility: CLINIC | Age: 44
End: 2024-03-05
Payer: COMMERCIAL

## 2024-03-05 LAB — PSYCHE PATHOLOGY RESULT: NORMAL

## 2024-03-07 NOTE — PROGRESS NOTES
Subjective:       Patient ID: Brittnee Wakefield is a 44 y.o. female.    Chief Complaint: New Patient    Diagnosis:  Left Breast Cancer - ER+, CO+, HER 2-    Current Treatment: None    Treatment History: N/A    HPI:   45 yo F presents in March '24 for evaluation of Hormone positive Left Breast Cancer. She began to feel more tired and run down in the fall '23, therefore PCP recommended she undergo her age appropriate cancer screenings prompting her for screening MMG. At that time she was noted in the left breast to have an 2 abnormal mass detected at the 8-9 o'clock and 11-12 o'clock positions. Follow up imaging revealed numerous enhancing masses throughout the left breast on DG MMG/US. She underwent breast MRI in January '24 which revealed 3 masses, all around 1cm in her left breast at different locations. She underwent biopsy and ultimately 1 of the 3 returned malignant, the other 2 were biopsy proven benign fibroadenoma changes. She ultimately had a 1.3 x 0.9 x 0.7cm irregular enhancing mass with spiculated margins at the 8 o'clock position 5 CMFN. Biopsy revealed invasive ductal carcinoma, G1, ER 72%, CO 84%, HER 2 1+, Ki67 10%. She underwent Left Breast Lumpectomy with SLNBx with Dr. Greenberg on 2/19/24. Final path consistent with 9mm of IDC measuring 9mm. 0/3 lymph nodes involved with metastatic disease. Surgical oncology had an oncotpe performed which had a score of 17. She is pre menopausal at the time of diagnosis.     Patient is doing well today. Healing well without complaints.     I discussed her diagnosis, staging, prognosis, and referenced NCCN guidelines when discussing her therapy moving forward. We reviewed her oncotype in great detail given she is a T1b grade 1 lesion her tumor was not studied in the T[employer] trial, however with an oncotype of 17 there is a 1.6% benefit to chemotherapy in women <49yo, but it is not clear if this benefit was from chemotherapy itself or the resulting ovarian suppression. She  expressed understanding.     Past Medical History:   Diagnosis Date    Anxiety     Cancer     breast left    History of seizures     At the age of 1yo      Past Surgical History:   Procedure Laterality Date     SECTION      MASTECTOMY, PARTIAL Left 2024    Procedure: MASTECTOMY, PARTIAL - LEFT Need Sentimag Need Faxitron Need sterile charms and sterile ink;  Surgeon: Teri Greenberg MD;  Location: [facility] OR;  Service: General;  Laterality: Left;  Need Sentimag  Need Faxitron  Need sterile charms and sterile ink    SENTINEL LYMPH NODE BIOPSY Left 2024    Procedure: BIOPSY, LYMPH NODE, SENTINEL - LEFT  need nuc med need marco antonio node;  Surgeon: Teri Greenberg MD;  Location: [facility] OR;  Service: General;  Laterality: Left;  need nuc med  need marco antonio node     Social History     Socioeconomic History    Marital status:    Tobacco Use    Smoking status: Former     Types: Cigarettes    Smokeless tobacco: Never   Substance and Sexual Activity    Alcohol use: Not Currently    Drug use: Never    Sexual activity: Yes     Partners: Male      Family History   Problem Relation Age of Onset    Liver cancer Father     Hepatitis Maternal Grandmother     Breast cancer Maternal Aunt       Review of patient's allergies indicates:   Allergen Reactions    Adhesive      Skin tear      Review of Systems   Constitutional:  Negative for activity change, fever and unexpected weight change.   HENT:  Negative for sore throat.    Eyes:  Negative for visual disturbance.   Respiratory:  Negative for cough and shortness of breath.    Cardiovascular:  Negative for chest pain.   Gastrointestinal:  Negative for abdominal pain, diarrhea, nausea and vomiting.   Endocrine: Negative for polyuria.   Genitourinary:  Negative for dysuria and hot flashes.   Integumentary:  Negative for rash.   Neurological:  Negative for weakness and headaches.   Hematological:  Negative for adenopathy.   Psychiatric/Behavioral:  Negative for confusion.           Objective:      Vitals:    24 1322   BP: 105/74   Pulse: 67   Resp: 14   Temp: 97.8 °F (36.6 °C)       Physical Exam  Constitutional:       General: She is not in acute distress.     Appearance: Normal appearance. She is not ill-appearing.   HENT:      Head: Normocephalic and atraumatic.      Nose: Nose normal.      Mouth/Throat:      Mouth: Mucous membranes are moist.      Pharynx: Oropharynx is clear.   Eyes:      Extraocular Movements: Extraocular movements intact.      Conjunctiva/sclera: Conjunctivae normal.      Pupils: Pupils are equal, round, and reactive to light.   Cardiovascular:      Rate and Rhythm: Normal rate and regular rhythm.      Pulses: Normal pulses.      Heart sounds: Normal heart sounds. No murmur heard.  Pulmonary:      Effort: Pulmonary effort is normal. No respiratory distress.      Breath sounds: Normal breath sounds.   Abdominal:      General: There is no distension.      Palpations: Abdomen is soft.      Tenderness: There is no abdominal tenderness.   Musculoskeletal:         General: Normal range of motion.      Cervical back: Normal range of motion and neck supple.      Right lower leg: No edema.      Left lower leg: No edema.   Lymphadenopathy:      Cervical: No cervical adenopathy.   Skin:     General: Skin is warm and dry.   Neurological:      General: No focal deficit present.      Mental Status: She is alert and oriented to person, place, and time.         LABS AND IMAGING REVIEWED IN EPIC     Imagin2023 BL SC MG at [facility]- which revealed in L breast at 8 o'clock -9 o'clock middle depth, there is an irregular mass with spiculated margins and associated architectural distortion.  At 11 o'clock- 12 o'clock L breast anterior depth, there is an oval mass seen.   Additionally, there is an oval mass in the L breast upper outer quadrant middle depth.   No significant masses, calcifications, or other findings are seen in the R breast. BIRADS-0; additional imaging  needed     2. 12/14/2023 L DG MG/ L US BREAST COMPLETE at [facility]- which revealed on MG:       a. At 8 o'clock middle depth, there is a persistent 0.8 cm irregular equal density mass with spiculated margins and associated architectural distortion (spanning 4.5 cm)        b. At 11 o'clock anterior depth, there is a persistent 1.0 cm oval high density mass with circumscribed margins,         c. At 1 o'clock middle depth, , there is a 0.9 cm oval equal density mass with circumscribed margins      On L US:         a. At 8 o'clock 5 cm FN,  there is a 0.8 cm x 0.7 cm x 0.7 cm irregular avascular hypoechoic mass with spiculated margins, posterior shadowing, and associated architectural distortion           b  At 11 o'clock 3 cm FN, there is a 1.0 cm x 0.6 cm x 0.8 cm oval avascular hypoechoic mass with angular margins and posterior shadowing           c. At 1 o'clock  3 cm FN, there is a 0.9 cm x 0.4 cm x 0.8 cm parallel oval avascular hypoechoic mass with circumscribed margins   BIRADS-5 highly suspicious;biopsy recommended.     3. 1/31/2024  BREAST MRI- which revealed       Left Breast-        A. At 8 o'clock 5 cm FN,  there is a 1.3 x 0.9 x 0.7 cm irregular, enhancing mass with spiculated margins (biopsy proven malignancy)        B. At 1 o'clock 3 cm FN, there is a 0.9 x 0.5 x 1.0 cm oval, enhancing mass with circumscribed margins (biopsy proven benign fibroadenomatoid change and sclerosing adenosis)         C. At 11 o'clock 3 cm FN, there is a 1.0 x 0.9 x 0.8 cm oval, enhancing mass with circumscribed margins.(biopsy proven fibroadenoma/fibroadenomatoid change)         Right Breast- .no suspicious areas of enhancement or areas of architectural distortion are seen.  There is no skin thickening or nipple retraction.  No axillary or internal mammary lymphadenopathy is identified.     BIRADS-6 Known biopsy proven malignancy    Pathology:  1/16/2024 Ultrasound-guided Core Needle Biopsy Left Breast 8 o'clock 5 cm FN-         - Invasive ductal carcinoma, grade 1 (measuring 9.0 mm)        - Ductal carcinoma in situ, grade 2 without necrosis          ER 72%          AL 84%          HER 2 ezio 1+          Ki67 10%     2. 1/16/2024 Ultrasound-guided Core Needle Biopsy Left Breast 11 o'clock 3 cm FN-        - Fibroadenoma/ fibroadenomatoid change  3. 1/16/2024 Ultrasound-guided Core Needle Biopsy Left Breast 1 o'clock 3 cm FN-        - Fibroadenomatoid change and sclerosing adenosis  4. 2/19/2024 Left Lumpectomy with Left [place] Lymph Node Biopsy which revealed invasive ductal carcinoma, grade 1 (measuring 0.9 cm); and lobular carcinoma in situ.  All margins clear.  Three sentinel lymph nodes negative for metastaic carcinoma.       Assessment:   Stage 1A Left Hormone Positive Breast Cancer - T1bN0, G1. Oncotype 17. Discussed risks/benefits/toxicity/role for chemotherapy in this setting. She is going to think about it and let me know. Regardless she will need adjuvant radiation given lumpectomy and 5-10 years of AI therapy.         Plan:     - She will go home and consider adjuvant chemotherapy, would offer her TC Q3w x 4  - After XRT will plan for AI + OS for 5-10 years  - RTC pending her decision on adjuvant chemotherapy  - Print outs were given of data and oncotype    I spent a total of 60 minutes on the day of the visit.This includes face to face time and non-face to face time preparing to see the patient (eg, review of tests), obtaining and/or reviewing separately obtained history, documenting clinical information in the electronic or other health record, independently interpreting results and communicating results to the patient/family/caregiver, or care coordinator.        Elizabeth Kennedy LeJeune, MD  Hematology/Oncology   [facility]        Professional Services   I, Marta Dupont LPN, acted solely as a scribe for and in the presence of Dr. Elizabeth Kennedy LeJeune, who performed these services.

## 2024-03-08 NOTE — PROGRESS NOTES
A multidisciplinary Tumor Board meeting was held to consensually decide upon treatment recommendations for this breast cancer patient. The members present at the meeting included physicians representing breast radiology, surgical oncology, medical oncology, and radiation oncology.    Date of meeting: 3/5/2024    Patient Name: Brittnee Wakefield  : 1980  Age: 44 y.o.  Sex: female      The patient's clinical history, staging, radiology, pathology, labs/biomarkers, and past treatments were discussed in detail. The following recommendations were made regarding further treatment:    Oncology History   Malignant neoplasm of lower-inner quadrant of left breast in female, estrogen receptor positive   2024 Cancer Staged    Staging form: Breast, AJCC 8th Edition  - Clinical stage from 2024: Stage IA (cT1b, cN0, cM0, G1, ER+, CO+, HER2-)     2024 Initial Diagnosis    Malignant neoplasm of lower-inner quadrant of left breast in female, estrogen receptor positive     2024 Cancer Staged    Staging form: Breast, AJCC 8th Edition  - Pathologic stage from 2024: Stage IA (pT1b, pN0(sn), cM0, G1, ER+, CO+, HER2-)           Recommendations:  Surgery: No further surgical recommendations. S/P lumpectomy w/ SLNB 2024.    Radiation Oncology: XRT recommended    Medical Oncology: Oncotype resulted at 17. Discussed considering chemotherapy given she is premenpausal. If not, would do ovarian suppression + AI.    Notes/Comments:  genetic testing recommended.

## 2024-03-11 ENCOUNTER — DOCUMENTATION ONLY (OUTPATIENT)
Dept: HEMATOLOGY/ONCOLOGY | Facility: CLINIC | Age: 44
End: 2024-03-11
Payer: COMMERCIAL

## 2024-03-11 NOTE — NURSING
I spoke with Brittnee regarding her  appointment with Dr. Lejeune . I introduced myself, explained my role and what to expect. I answered any questions and also confirmed appointment time and arrival time.

## 2024-03-12 ENCOUNTER — OFFICE VISIT (OUTPATIENT)
Dept: HEMATOLOGY/ONCOLOGY | Facility: CLINIC | Age: 44
End: 2024-03-12
Payer: COMMERCIAL

## 2024-03-12 VITALS
HEART RATE: 67 BPM | TEMPERATURE: 98 F | BODY MASS INDEX: 36.57 KG/M2 | WEIGHT: 214.19 LBS | RESPIRATION RATE: 14 BRPM | SYSTOLIC BLOOD PRESSURE: 105 MMHG | OXYGEN SATURATION: 98 % | DIASTOLIC BLOOD PRESSURE: 74 MMHG | HEIGHT: 64 IN

## 2024-03-12 DIAGNOSIS — Z17.0 MALIGNANT NEOPLASM OF LOWER-INNER QUADRANT OF LEFT BREAST IN FEMALE, ESTROGEN RECEPTOR POSITIVE: ICD-10-CM

## 2024-03-12 DIAGNOSIS — C50.312 MALIGNANT NEOPLASM OF LOWER-INNER QUADRANT OF LEFT BREAST IN FEMALE, ESTROGEN RECEPTOR POSITIVE: ICD-10-CM

## 2024-03-12 PROCEDURE — 1160F RVW MEDS BY RX/DR IN RCRD: CPT | Mod: CPTII,S$GLB,, | Performed by: STUDENT IN AN ORGANIZED HEALTH CARE EDUCATION/TRAINING PROGRAM

## 2024-03-12 PROCEDURE — 3008F BODY MASS INDEX DOCD: CPT | Mod: CPTII,S$GLB,, | Performed by: STUDENT IN AN ORGANIZED HEALTH CARE EDUCATION/TRAINING PROGRAM

## 2024-03-12 PROCEDURE — 3078F DIAST BP <80 MM HG: CPT | Mod: CPTII,S$GLB,, | Performed by: STUDENT IN AN ORGANIZED HEALTH CARE EDUCATION/TRAINING PROGRAM

## 2024-03-12 PROCEDURE — 99205 OFFICE O/P NEW HI 60 MIN: CPT | Mod: S$GLB,,, | Performed by: STUDENT IN AN ORGANIZED HEALTH CARE EDUCATION/TRAINING PROGRAM

## 2024-03-12 PROCEDURE — 99999 PR PBB SHADOW E&M-EST. PATIENT-LVL IV: CPT | Mod: PBBFAC,,, | Performed by: STUDENT IN AN ORGANIZED HEALTH CARE EDUCATION/TRAINING PROGRAM

## 2024-03-12 PROCEDURE — 3074F SYST BP LT 130 MM HG: CPT | Mod: CPTII,S$GLB,, | Performed by: STUDENT IN AN ORGANIZED HEALTH CARE EDUCATION/TRAINING PROGRAM

## 2024-03-12 PROCEDURE — 1159F MED LIST DOCD IN RCRD: CPT | Mod: CPTII,S$GLB,, | Performed by: STUDENT IN AN ORGANIZED HEALTH CARE EDUCATION/TRAINING PROGRAM

## 2024-03-14 ENCOUNTER — TELEPHONE (OUTPATIENT)
Dept: HEMATOLOGY/ONCOLOGY | Facility: CLINIC | Age: 44
End: 2024-03-14
Payer: COMMERCIAL

## 2024-03-18 ENCOUNTER — CLINICAL SUPPORT (OUTPATIENT)
Dept: RADIATION THERAPY | Facility: HOSPITAL | Age: 44
End: 2024-03-18
Attending: RADIOLOGY
Payer: COMMERCIAL

## 2024-03-18 DIAGNOSIS — C50.312 MALIGNANT NEOPLASM OF LOWER-INNER QUADRANT OF LEFT BREAST IN FEMALE, ESTROGEN RECEPTOR POSITIVE: ICD-10-CM

## 2024-03-18 DIAGNOSIS — Z17.0 MALIGNANT NEOPLASM OF LOWER-INNER QUADRANT OF LEFT BREAST IN FEMALE, ESTROGEN RECEPTOR POSITIVE: ICD-10-CM

## 2024-03-18 PROCEDURE — 77334 RADIATION TREATMENT AID(S): CPT | Performed by: RADIOLOGY

## 2024-03-18 PROCEDURE — 77290 THER RAD SIMULAJ FIELD CPLX: CPT | Performed by: RADIOLOGY

## 2024-03-20 ENCOUNTER — OFFICE VISIT (OUTPATIENT)
Dept: HEMATOLOGY/ONCOLOGY | Facility: CLINIC | Age: 44
End: 2024-03-20
Payer: COMMERCIAL

## 2024-03-20 DIAGNOSIS — C50.312 MALIGNANT NEOPLASM OF LOWER-INNER QUADRANT OF LEFT BREAST IN FEMALE, ESTROGEN RECEPTOR POSITIVE: ICD-10-CM

## 2024-03-20 DIAGNOSIS — Z17.0 MALIGNANT NEOPLASM OF LOWER-INNER QUADRANT OF LEFT BREAST IN FEMALE, ESTROGEN RECEPTOR POSITIVE: ICD-10-CM

## 2024-03-20 DIAGNOSIS — Z80.3 FAMILY HISTORY OF BREAST CANCER: Primary | ICD-10-CM

## 2024-03-20 PROCEDURE — 99215 OFFICE O/P EST HI 40 MIN: CPT | Mod: 95,,, | Performed by: NURSE PRACTITIONER

## 2024-03-20 NOTE — PROGRESS NOTES
REFERRING PROVIDER: Dr. Elizabeth Lejeune    Subjective:       Patient ID: Brittnee Wakefield is a 44 y.o. female.    Chief Complaint: Personal recent diagnosis of breast cancer dx44y and a family history of cancer. Patient presented via Telemedicine Visit (audio and video) today for risk assessment, genetic counseling, and consideration for genetic testing.    HPI  Past Medical History:   Diagnosis Date    Anxiety     Cancer     breast left    History of seizures     At the age of 3yo        Past Surgical History:   Procedure Laterality Date     SECTION      MASTECTOMY, PARTIAL Left 2024    Procedure: MASTECTOMY, PARTIAL - LEFT Need Sentimag Need Faxitron Need sterile charms and sterile ink;  Surgeon: Teri Greenberg MD;  Location[facility];  Service: General;  Laterality: Left;  Need Sentimag  Need Faxitron  Need sterile charms and sterile ink    SENTINEL LYMPH NODE BIOPSY Left 2024    Procedure: BIOPSY, LYMPH NODE, SENTINEL - LEFT  need nuc med need marco antonio node;  Surgeon: Teri Greenberg MD;  Location[facility];  Service: General;  Laterality: Left;  need nuc med  need marco antonio node        Review of patient's allergies indicates:  Adhesive     Review of Systems      Oncology History    No history exists.      Family History   Problem Relation Age of Onset    Liver cancer Father     Hepatitis Maternal Grandmother     Cancer Maternal Grandmother     Breast cancer Maternal Aunt 69    Brain cancer Maternal Cousin 28          Assessment:   Risk Assessment:  This patient is at increased risk of having a genetic mutation that increases the risk of cancer. Patient meets criteria for genetic testing based on the National Comprehensive Cancer Network (NCCN) criteria due to a personal history of breast cancer diagnosed under 46y (dx44y) and a family history that includes breast cancer (maternal aunt) (see family history and pedigree). Based on the likelihood of having a mutation, BRCA1/2 Analyses with [facility]  testing through [facility] was described in detail.    Education and Counseling:  [facility] is a gene panel that evaluates a broad number of hereditary cancer syndromes to help define patients' cancer risk with a focus on eight primary cancer sites (breast, ovarian, gastric, colorectal, pancreatic, melanoma, prostate, and endometrial). This test is appropriate for patients that meet criteria for genetic testing for Breast and Ovarian Cancer Syndrome. This panel will test for genes that increase cancer risk APC, CHRIS, AXIN2, BAP1, BARD1, BMPR1A, BRCA1, BRCA2, BRIP1, CDH1, CDK4, CDKN2A, CHEK2, CTNNA1, FH, FLCN, HOXB13 (seq only), MEN1, MET, MLH1, MSH2, MSH3 (excluding repetitive portions of exon 1), MSH6, MUTYH, NTHL1, PALB2, PMS2, PTEN, RAD51C, RAD51D, SDHA, SDHB, SDHC, SDHD, SMAD4, STK11, TP53, TSC1, TSC2, VHL. Limited promoter regions may also be analyzed for large rearrangements. Sequencing (seq) and/or large rearrangement (LR) analyses were performed only for the gene portions indicated in parenthesis for the following genes: EGFR (exons 18-21, seq and LR), EPCAM (exons 8-9, LR only), GREM1 (exon 1 and upstream regulatory regions, LR only), MITF (c.952, seq only), POLE (exonuclease domain, seq only), POLD1 (exonuclease domain, seq only), RET (exons 5, 8, 10, 11, 13-16 seq and LR), TERT (promoter region 71 bases upstream of the translation start, c.-71_-1, seq only)    Risks of cancer associated with inherited cancer predisposition mutations were discussed in detail.  If a mutation were found, this patient would have a significantly increased risk for cancer.  It has been shown that individuals with a BRCA1 or BRCA2 mutation face a 56-87% lifetime risk of breast cancer and a 27-44% lifetime risk of ovarian cancer. Mutation carriers who have had breast cancer have a 20% (BRCA1) or 12% (BRCA2) chance of developing a second breast cancer within 5 years, and up to 64% (BRCA1) or 52% (BRCA2) of a  second breast cancer by age 70. Mutation carriers have up to a 5% risk of pancreatic cancer, as well as increased risk for other cancers such as colon cancer and lymphoma. Other inherited cancer syndromes that are also included in the myRisk test, may have different, but still significant risk for cancer.    The availability of clinical management options for inherited cancer predisposition mutation carriers was discussed, including increased surveillance, chemo prevention, and prophylactic surgery. Details of the testing process, including benefits and limitations of genetic analysis as well as the implications of possible test results, were discussed.  Because this patient is the first member of the family to be tested comprehensive testing was presented.  Related insurance issues were discussed.      Summary:  This patient was evaluated for hereditary risk of cancer and was found to be at an increased risk of having an inherited cancer predisposition mutation.  The option of genetic testing was explained in detail, including the possible impact of this information on family members.  Since this patient wishes to proceed with testing an informed consent will be obtained and blood drawn and sent to [facility].  Results will be expected 4 weeks from this time.  A follow-up appointment will be scheduled for results disclosure.        The patient location is: home    Visit type: audiovisual    Face to Face time with patient: 40 minutes  >60 minutes of total time spent on the encounter, which includes face to face time and non-face to face time preparing to see the patient (eg, review of tests), Obtaining and/or reviewing separately obtained history, Documenting clinical information in the electronic or other health record, Independently interpreting results (not separately reported) and communicating results to the patient/family/caregiver, or Care coordination (not separately reported).         Each  patient to whom he or she provides medical services by telemedicine is:  (1) informed of the relationship between the physician and patient and the respective role of any other health care provider with respect to management of the patient; and (2) notified that he or she may decline to receive medical services by telemedicine and may withdraw from such care at any time.      MONICA WHITLOKC, PhD    Answers submitted by the patient for this visit:  Review of Systems Questionnaire (Submitted on 3/20/2024)  appetite change : No  unexpected weight change: No  mouth sores: No  visual disturbance: No  cough: No  shortness of breath: No  chest pain: No  abdominal pain: No  diarrhea: No  frequency: No  back pain: No  rash: No  headaches: No  adenopathy: No  nervous/ anxious: No     No

## 2024-03-22 ENCOUNTER — LAB VISIT (OUTPATIENT)
Dept: LAB | Facility: HOSPITAL | Age: 44
End: 2024-03-22
Attending: RADIOLOGY
Payer: COMMERCIAL

## 2024-03-22 DIAGNOSIS — Z00.00 WELLNESS EXAMINATION: ICD-10-CM

## 2024-03-22 DIAGNOSIS — C50.312 MALIGNANT NEOPLASM OF LOWER-INNER QUADRANT OF LEFT FEMALE BREAST: Primary | ICD-10-CM

## 2024-03-22 LAB
APPEARANCE UR: ABNORMAL
B-HCG SERPL QL: NEGATIVE
BACTERIA #/AREA URNS AUTO: ABNORMAL /HPF
BILIRUB UR QL STRIP.AUTO: NEGATIVE
COLOR UR AUTO: YELLOW
GLUCOSE UR QL STRIP.AUTO: NEGATIVE
KETONES UR QL STRIP.AUTO: NEGATIVE
LEUKOCYTE ESTERASE UR QL STRIP.AUTO: NEGATIVE
NITRITE UR QL STRIP.AUTO: NEGATIVE
PH UR STRIP.AUTO: 6 [PH]
PROT UR QL STRIP.AUTO: NEGATIVE
RBC UR QL AUTO: NEGATIVE
SP GR UR STRIP.AUTO: >=1.03 (ref 1–1.03)
SQUAMOUS #/AREA URNS AUTO: ABNORMAL /HPF
UROBILINOGEN UR STRIP-ACNC: 1

## 2024-03-22 PROCEDURE — 81025 URINE PREGNANCY TEST: CPT

## 2024-03-22 PROCEDURE — 77334 RADIATION TREATMENT AID(S): CPT | Performed by: RADIOLOGY

## 2024-03-22 PROCEDURE — 77300 RADIATION THERAPY DOSE PLAN: CPT | Performed by: RADIOLOGY

## 2024-03-22 PROCEDURE — 81003 URINALYSIS AUTO W/O SCOPE: CPT

## 2024-03-25 PROCEDURE — 77295 3-D RADIOTHERAPY PLAN: CPT | Performed by: RADIOLOGY

## 2024-03-25 PROCEDURE — 77280 THER RAD SIMULAJ FIELD SMPL: CPT | Performed by: RADIOLOGY

## 2024-03-25 PROCEDURE — 77412 RADIATION TX DELIVERY LVL 3: CPT | Performed by: RADIOLOGY

## 2024-03-26 PROCEDURE — 77412 RADIATION TX DELIVERY LVL 3: CPT | Performed by: RADIOLOGY

## 2024-03-27 PROCEDURE — 77412 RADIATION TX DELIVERY LVL 3: CPT | Performed by: RADIOLOGY

## 2024-03-29 PROCEDURE — 77412 RADIATION TX DELIVERY LVL 3: CPT | Performed by: RADIOLOGY

## 2024-04-01 ENCOUNTER — CLINICAL SUPPORT (OUTPATIENT)
Dept: RADIATION THERAPY | Facility: HOSPITAL | Age: 44
End: 2024-04-01
Attending: RADIOLOGY
Payer: COMMERCIAL

## 2024-04-01 PROCEDURE — 77336 RADIATION PHYSICS CONSULT: CPT | Performed by: RADIOLOGY

## 2024-04-01 PROCEDURE — 77412 RADIATION TX DELIVERY LVL 3: CPT | Performed by: RADIOLOGY

## 2024-04-02 PROCEDURE — 77412 RADIATION TX DELIVERY LVL 3: CPT | Performed by: RADIOLOGY

## 2024-04-02 PROCEDURE — 77417 THER RADIOLOGY PORT IMAGE(S): CPT | Performed by: RADIOLOGY

## 2024-04-03 PROCEDURE — 77412 RADIATION TX DELIVERY LVL 3: CPT | Performed by: RADIOLOGY

## 2024-04-04 PROCEDURE — 77412 RADIATION TX DELIVERY LVL 3: CPT | Performed by: RADIOLOGY

## 2024-04-05 PROCEDURE — 77412 RADIATION TX DELIVERY LVL 3: CPT | Performed by: RADIOLOGY

## 2024-04-08 PROCEDURE — 77336 RADIATION PHYSICS CONSULT: CPT | Performed by: RADIOLOGY

## 2024-04-08 PROCEDURE — 77412 RADIATION TX DELIVERY LVL 3: CPT | Performed by: RADIOLOGY

## 2024-04-09 PROCEDURE — 77412 RADIATION TX DELIVERY LVL 3: CPT | Performed by: RADIOLOGY

## 2024-04-09 PROCEDURE — 77417 THER RADIOLOGY PORT IMAGE(S): CPT | Performed by: RADIOLOGY

## 2024-04-10 PROCEDURE — 77412 RADIATION TX DELIVERY LVL 3: CPT | Performed by: RADIOLOGY

## 2024-04-11 PROCEDURE — 77412 RADIATION TX DELIVERY LVL 3: CPT | Performed by: RADIOLOGY

## 2024-04-12 PROCEDURE — 77412 RADIATION TX DELIVERY LVL 3: CPT | Performed by: RADIOLOGY

## 2024-04-15 PROCEDURE — 77412 RADIATION TX DELIVERY LVL 3: CPT | Performed by: RADIOLOGY

## 2024-04-15 PROCEDURE — 77336 RADIATION PHYSICS CONSULT: CPT | Performed by: RADIOLOGY

## 2024-04-16 PROCEDURE — 77412 RADIATION TX DELIVERY LVL 3: CPT | Performed by: RADIOLOGY

## 2024-04-16 PROCEDURE — 77280 THER RAD SIMULAJ FIELD SMPL: CPT | Performed by: RADIOLOGY

## 2024-04-17 PROCEDURE — 77412 RADIATION TX DELIVERY LVL 3: CPT | Performed by: RADIOLOGY

## 2024-04-18 PROCEDURE — 77412 RADIATION TX DELIVERY LVL 3: CPT | Performed by: RADIOLOGY

## 2024-04-19 PROCEDURE — 77412 RADIATION TX DELIVERY LVL 3: CPT | Performed by: RADIOLOGY

## 2024-04-22 PROCEDURE — 77412 RADIATION TX DELIVERY LVL 3: CPT | Performed by: RADIOLOGY

## 2024-04-22 PROCEDURE — 77336 RADIATION PHYSICS CONSULT: CPT | Performed by: RADIOLOGY

## 2024-04-22 NOTE — PROGRESS NOTES
Subjective:       Patient ID: Brittnee Wakefield is a 44 y.o. female.    Chief Complaint: Follow up    Diagnosis:  Left Breast Cancer - ER+, MI+, HER 2-    Current Treatment:   Zoladex + Letrozole - To start 5/6/24    Treatment History:   2/19/2024 Left Lumpectomy with Left [place] Lymph Node Biopsy - Dr. Dumont  Adjuvant XRT - 4/1/24 - 4/19/24    HPI:   45 yo F presents in March '24 for evaluation of Hormone positive Left Breast Cancer. She began to feel more tired and run down in the fall '23, therefore PCP recommended she undergo her age appropriate cancer screenings prompting her for screening MMG. At that time she was noted in the left breast to have 2 abnormal masses detected at the 8-9 o'clock and 11-12 o'clock positions. Follow up imaging revealed numerous enhancing masses throughout the left breast on DG MMG/US. She underwent breast MRI in January '24 which revealed 3 masses, all around 1cm in her left breast at different locations. She underwent biopsy and ultimately 1 of the 3 returned malignant, the other 2 were biopsy proven benign fibroadenoma changes. She ultimately had a 1.3 x 0.9 x 0.7cm irregular enhancing mass with spiculated margins at the 8 o'clock position 5 CMFN. Biopsy revealed invasive ductal carcinoma, G1, ER 72%, MI 84%, HER 2 1+, Ki67 10%. She underwent Left Breast Lumpectomy with SLNBx with Dr. Greenberg on 2/19/24. Final path consistent with 9mm of IDC measuring 9mm. 0/3 lymph nodes involved with metastatic disease. Surgical oncology had an oncotype performed which had a score of 17. She is pre menopausal at the time of diagnosis.     I discussed her diagnosis, staging, prognosis, and referenced NCCN guidelines when discussing her therapy moving forward. We reviewed her oncotype in great detail given she is a T1b grade 1 lesion her tumor was not studied in the TailoRx trial, however with an oncotype of 17 there is a 1.6% benefit to chemotherapy in women <49yo, but it is not clear if this  benefit was from chemotherapy itself or the resulting ovarian suppression. She expressed understanding. She ultimately decided to forego chemotherapy.     Interval History:  Patient presents to clinic today for MD follow up appointment and labs to discuss results and plan of treatment.   She is doing well today.  Her last dose of radiation was on .   Continues to have monthly menses.  Denies any new breast pains or palpable masses.  Discussed treatment plan with AI therapy.  She has no complaints or concerns today.        Past Medical History:   Diagnosis Date    Anxiety     Cancer     breast left    History of seizures     At the age of 3yo      Past Surgical History:   Procedure Laterality Date     SECTION      MASTECTOMY, PARTIAL Left 2024    Procedure: MASTECTOMY, PARTIAL - LEFT Need Sentimag Need Faxitron Need sterile charms and sterile ink;  Surgeon: Teri Greenberg MD;  Location: [facility] OR;  Service: General;  Laterality: Left;  Need Sentimag  Need Faxitron  Need sterile charms and sterile ink    SENTINEL LYMPH NODE BIOPSY Left 2024    Procedure: BIOPSY, LYMPH NODE, SENTINEL - LEFT  need nuc med need marco antonio node;  Surgeon: Teri Greenberg MD;  Location: [facility] OR;  Service: General;  Laterality: Left;  need nuc med  need marco antonio node     Social History     Socioeconomic History    Marital status:    Tobacco Use    Smoking status: Former     Types: Cigarettes    Smokeless tobacco: Never   Substance and Sexual Activity    Alcohol use: Not Currently    Drug use: Never    Sexual activity: Yes     Partners: Male     Birth control/protection: Condom      Family History   Problem Relation Name Age of Onset    Liver cancer Father      Hepatitis Maternal Grandmother      Cancer Maternal Grandmother      Breast cancer Maternal Aunt Claudia Ho 69    Cancer Maternal Aunt Claudia Ho     Brain cancer Maternal Cousin  28      Review of patient's allergies indicates:   Allergen Reactions    Adhesive       Skin tear      Review of Systems   Constitutional:  Negative for activity change, fever and unexpected weight change.   HENT:  Negative for sore throat.    Eyes:  Negative for visual disturbance.   Respiratory:  Negative for cough and shortness of breath.    Cardiovascular:  Negative for chest pain.   Gastrointestinal:  Negative for abdominal pain, diarrhea, nausea and vomiting.   Endocrine: Negative for polyuria.   Genitourinary:  Negative for dysuria and hot flashes.   Integumentary:  Negative for rash.   Neurological:  Negative for weakness and headaches.   Hematological:  Negative for adenopathy.   Psychiatric/Behavioral:  Negative for confusion.          Objective:      Vitals:    04/29/24 0950   BP: 109/75   Pulse: 67   Resp: 14   Temp: 97.4 °F (36.3 °C)         Physical Exam  Constitutional:       General: She is not in acute distress.     Appearance: Normal appearance. She is not ill-appearing.   HENT:      Head: Normocephalic and atraumatic.      Nose: Nose normal.      Mouth/Throat:      Mouth: Mucous membranes are moist.      Pharynx: Oropharynx is clear.   Eyes:      Extraocular Movements: Extraocular movements intact.      Conjunctiva/sclera: Conjunctivae normal.      Pupils: Pupils are equal, round, and reactive to light.   Cardiovascular:      Rate and Rhythm: Normal rate and regular rhythm.      Pulses: Normal pulses.      Heart sounds: Normal heart sounds. No murmur heard.  Pulmonary:      Effort: Pulmonary effort is normal. No respiratory distress.      Breath sounds: Normal breath sounds.   Abdominal:      General: There is no distension.      Palpations: Abdomen is soft.      Tenderness: There is no abdominal tenderness.   Musculoskeletal:         General: Normal range of motion.      Cervical back: Normal range of motion and neck supple.      Right lower leg: No edema.      Left lower leg: No edema.   Lymphadenopathy:      Cervical: No cervical adenopathy.   Skin:     General: Skin is warm  and dry.   Neurological:      General: No focal deficit present.      Mental Status: She is alert and oriented to person, place, and time.         LABS AND IMAGING REVIEWED IN EPIC     Imagin2023 BL SC MG at [facility]- which revealed in L breast at 8 o'clock -9 o'clock middle depth, there is an irregular mass with spiculated margins and associated architectural distortion.  At 11 o'clock- 12 o'clock L breast anterior depth, there is an oval mass seen.   Additionally, there is an oval mass in the L breast upper outer quadrant middle depth.   No significant masses, calcifications, or other findings are seen in the R breast. BIRADS-0; additional imaging needed     2. 2023 L DG MG/ L US BREAST COMPLETE at [facility]- which revealed on MG:       a. At 8 o'clock middle depth, there is a persistent 0.8 cm irregular equal density mass with spiculated margins and associated architectural distortion (spanning 4.5 cm)        b. At 11 o'clock anterior depth, there is a persistent 1.0 cm oval high density mass with circumscribed margins,         c. At 1 o'clock middle depth, , there is a 0.9 cm oval equal density mass with circumscribed margins      On L US:         a. At 8 o'clock 5 cm FN,  there is a 0.8 cm x 0.7 cm x 0.7 cm irregular avascular hypoechoic mass with spiculated margins, posterior shadowing, and associated architectural distortion           b  At 11 o'clock 3 cm FN, there is a 1.0 cm x 0.6 cm x 0.8 cm oval avascular hypoechoic mass with angular margins and posterior shadowing           c. At 1 o'clock  3 cm FN, there is a 0.9 cm x 0.4 cm x 0.8 cm parallel oval avascular hypoechoic mass with circumscribed margins   BIRADS-5 highly suspicious;biopsy recommended.     3. 2024 BL BREAST MRI- which revealed       Left Breast-        A. At 8 o'clock 5 cm FN,  there is a 1.3 x 0.9 x 0.7 cm irregular, enhancing mass with spiculated margins (biopsy proven malignancy)        B. At 1 o'clock 3 cm FN, there is a  0.9 x 0.5 x 1.0 cm oval, enhancing mass with circumscribed margins (biopsy proven benign fibroadenomatoid change and sclerosing adenosis)         C. At 11 o'clock 3 cm FN, there is a 1.0 x 0.9 x 0.8 cm oval, enhancing mass with circumscribed margins.(biopsy proven fibroadenoma/fibroadenomatoid change)         Right Breast- .no suspicious areas of enhancement or areas of architectural distortion are seen.  There is no skin thickening or nipple retraction.  No axillary or internal mammary lymphadenopathy is identified.     BIRADS-6 Known biopsy proven malignancy    Pathology:  1/16/2024 Ultrasound-guided Core Needle Biopsy Left Breast 8 o'clock 5 cm FN-        - Invasive ductal carcinoma, grade 1 (measuring 9.0 mm)        - Ductal carcinoma in situ, grade 2 without necrosis          ER 72%          MI 84%          HER 2 ezio 1+          Ki67 10%     2. 1/16/2024 Ultrasound-guided Core Needle Biopsy Left Breast 11 o'clock 3 cm FN-        - Fibroadenoma/ fibroadenomatoid change    3. 1/16/2024 Ultrasound-guided Core Needle Biopsy Left Breast 1 o'clock 3 cm FN-        - Fibroadenomatoid change and sclerosing adenosis    4. 2/19/2024 Left Lumpectomy with Left [place] Lymph Node Biopsy which revealed invasive ductal carcinoma, grade 1 (measuring 0.9 cm); and lobular carcinoma in situ.  All margins clear.  Three sentinel lymph nodes negative for metastaic carcinoma.       Assessment:   Stage 1A Left Hormone Positive Breast Cancer - T1bN0, G1. Oncotype 17. Discussed risks/benefits/toxicity/role for chemotherapy and she chose to not pursue adjuvant chemotherapy. She finished adjuvant XRT in April '24. Started on OS + AI in Feb '24 for planned 5-10 years.       Plan:     - Discussed options of Tamoxifen and Letrozole for AI therapy along with risks and benefits for her hormone positive breast cancer  - Discussed Zoladex injections for hormone suppression with the use of Letrozole  - Discussed need for a Dexa scan   - RTC for  monthly Zoladex injection on May 6, 2024; then begin Letrozole on May 8  - RTC for NP visit in 4.5 weeks, labs same day prior to C2 of Zoladex for toxicity check    I spent a total of 40 minutes on the day of the visit.This includes face to face time and non-face to face time preparing to see the patient (eg, review of tests), obtaining and/or reviewing separately obtained history, documenting clinical information in the electronic or other health record, independently interpreting results and communicating results to the patient/family/caregiver, or care coordinator.    Elizabeth Kennedy LeJeune, MD  Hematology/Oncology   [facility]        Professional Services   I, Marta Dupont LPN, acted solely as a scribe for and in the presence of Dr. Elizabeth Kennedy LeJeune, who performed these services.

## 2024-04-23 ENCOUNTER — OFFICE VISIT (OUTPATIENT)
Dept: HEMATOLOGY/ONCOLOGY | Facility: CLINIC | Age: 44
End: 2024-04-23
Payer: COMMERCIAL

## 2024-04-23 DIAGNOSIS — C50.312 MALIGNANT NEOPLASM OF LOWER-INNER QUADRANT OF LEFT BREAST IN FEMALE, ESTROGEN RECEPTOR POSITIVE: Primary | ICD-10-CM

## 2024-04-23 DIAGNOSIS — Z17.0 MALIGNANT NEOPLASM OF LOWER-INNER QUADRANT OF LEFT BREAST IN FEMALE, ESTROGEN RECEPTOR POSITIVE: Primary | ICD-10-CM

## 2024-04-23 PROCEDURE — 99213 OFFICE O/P EST LOW 20 MIN: CPT | Mod: 95,,, | Performed by: NURSE PRACTITIONER

## 2024-04-23 PROCEDURE — 1159F MED LIST DOCD IN RCRD: CPT | Mod: CPTII,95,, | Performed by: NURSE PRACTITIONER

## 2024-04-23 NOTE — PROGRESS NOTES
REFERRING PHYSICIAN: Dr. Elizabeth Lejeune    Subjective:       Patient ID: Brittnee Wakefield is a 44 y.o. female.    Chief Complaint: Personal recent diagnosis of breast cancer dx44y and a family history of cancer. Patient presented for genetic counseling on 3/20/2024 and was found appropriate for genetic testing based on the National Comprehensive Cancer Network (NCCN) criteria due to a personal history of breast cancer diagnosed under 45y (dx44y) and a family history that includes breast cancer (maternal aunt) (see family history and pedigree). She signed consent, lab was drawn and sent to [facility] for BRCA1/2 Analyses with myRisk panel testing. She presented via Telemedicine Visit (audio and video) today for results disclosure.     HPI    Past Medical History:   Diagnosis Date    Anxiety     Cancer     breast left    History of seizures     At the age of 3yo        Past Surgical History:   Procedure Laterality Date     SECTION      MASTECTOMY, PARTIAL Left 2024    Procedure: MASTECTOMY, PARTIAL - LEFT Need Sentimag Need Faxitron Need sterile charms and sterile ink;  Surgeon: Teri Greenberg MD;  Location: [facility];  Service: General;  Laterality: Left;  Need Sentimag  Need Faxitron  Need sterile charms and sterile ink    SENTINEL LYMPH NODE BIOPSY Left 2024    Procedure: BIOPSY, LYMPH NODE, SENTINEL - LEFT  need nuc med need marco antonio node;  Surgeon: Teri Greenberg MD;  Location: [facility];  Service: General;  Laterality: Left;  need nuc med  need marco antonio node        Adhesive     Review of Systems   Constitutional:  Negative for appetite change and unexpected weight change.   HENT:  Negative for mouth sores.    Eyes:  Negative for visual disturbance.   Respiratory:  Negative for cough and shortness of breath.    Cardiovascular:  Negative for chest pain.   Gastrointestinal:  Negative for abdominal pain and diarrhea.   Genitourinary:  Negative for frequency.   Musculoskeletal:   Negative for back pain.   Integumentary:  Negative for rash.   Neurological:  Negative for headaches.   Hematological:  Negative for adenopathy.   Psychiatric/Behavioral:  The patient is not nervous/anxious.                  Problem List Items Addressed This Visit    None      Oncology History   Malignant neoplasm of lower-inner quadrant of left breast in female, estrogen receptor positive   1/25/2024 Cancer Staged    Staging form: Breast, AJCC 8th Edition  - Clinical stage from 1/25/2024: Stage IA (cT1b, cN0, cM0, G1, ER+, SD+, HER2-)     1/26/2024 Initial Diagnosis    Malignant neoplasm of lower-inner quadrant of left breast in female, estrogen receptor positive     2/29/2024 Cancer Staged    Staging form: Breast, AJCC 8th Edition  - Pathologic stage from 2/29/2024: Stage IA (pT1b, pN0(sn), cM0, G1, ER+, SD+, HER2-)               Family History   Problem Relation Name Age of Onset    Liver cancer Father      Hepatitis Maternal Grandmother      Cancer Maternal Grandmother      Breast cancer Maternal Aunt Claudia Ho 69    Brain cancer Maternal Cousin  28        Assessment:     Genetic testing was appropriate for this patient because of her personal and family history. This comprehensive myRisk analysis indicated that there was no deleterious mutation found in APC, CHRIS, AXIN2, BAP1, BARD1, BMPR1A, BRCA1, BRCA2, BRIP1, CDH1, CDK4, CDKN2A, CHEK2, CTNNA1, FH, FLCN, HOXB13 (seq only), MEN1, MET, MLH1, MSH2, MSH3 (excluding repetitive portions of exon 1), MSH6, MUTYH, NTHL1, PALB2, PMS2, PTEN, RAD51C, RAD51D, SDHA, SDHB, SDHC, SDHD, SMAD4, STK11, TP53, TSC1, TSC2, VHL. Limited promoter regions may also be analyzed for large rearrangements. Sequencing (seq) and/or large rearrangement (LR) analyses were performed only for the gene portions indicated in parenthesis for the following genes: EGFR (exons 18-21, seq and LR), EPCAM (exons 8-9, LR only), GREM1 (exon 1 and upstream regulatory regions, LR only), MITF (c.952,  seq only), POLE (exonuclease domain, seq only), POLD1 (exonuclease domain, seq only), RET (exons 5, 8, 10, 11, 13-16 seq and LR), TERT (promoter region 71 bases upstream of the translation start, c.-71_-1, seq only).     It was explained to the patient, that, although this analysis indicated a negative result, there are other, rare genetic abnormalities that this test will not detect. This result, however, rules out the majority of abnormalities believed to be responsible for hereditary susceptibility to cancer.    A copy of her result will be emailed to [email or web address]     The patient location is: home     Visit type: audiovisual    Face to Face time with patient: 10 minutes  20 minutes of total time spent on the encounter, which includes face to face time and non-face to face time preparing to see the patient (eg, review of tests), Obtaining and/or reviewing separately obtained history, Documenting clinical information in the electronic or other health record, Independently interpreting results (not separately reported) and communicating results to the patient/family/caregiver, or Care coordination (not separately reported).         Each patient to whom he or she provides medical services by telemedicine is:  (1) informed of the relationship between the physician and patient and the respective role of any other health care provider with respect to management of the patient; and (2) notified that he or she may decline to receive medical services by telemedicine and may withdraw from such care at any time.    MONICA WHITLOCK, PhD

## 2024-04-29 ENCOUNTER — OFFICE VISIT (OUTPATIENT)
Dept: HEMATOLOGY/ONCOLOGY | Facility: CLINIC | Age: 44
End: 2024-04-29
Payer: COMMERCIAL

## 2024-04-29 ENCOUNTER — LAB VISIT (OUTPATIENT)
Dept: LAB | Facility: HOSPITAL | Age: 44
End: 2024-04-29
Attending: STUDENT IN AN ORGANIZED HEALTH CARE EDUCATION/TRAINING PROGRAM
Payer: COMMERCIAL

## 2024-04-29 VITALS
RESPIRATION RATE: 14 BRPM | DIASTOLIC BLOOD PRESSURE: 75 MMHG | WEIGHT: 217.5 LBS | HEART RATE: 67 BPM | HEIGHT: 64 IN | TEMPERATURE: 97 F | BODY MASS INDEX: 37.13 KG/M2 | OXYGEN SATURATION: 97 % | SYSTOLIC BLOOD PRESSURE: 109 MMHG

## 2024-04-29 DIAGNOSIS — Z17.0 MALIGNANT NEOPLASM OF LOWER-INNER QUADRANT OF LEFT BREAST IN FEMALE, ESTROGEN RECEPTOR POSITIVE: Primary | ICD-10-CM

## 2024-04-29 DIAGNOSIS — Z17.0 MALIGNANT NEOPLASM OF LOWER-INNER QUADRANT OF LEFT BREAST IN FEMALE, ESTROGEN RECEPTOR POSITIVE: ICD-10-CM

## 2024-04-29 DIAGNOSIS — Z79.811 LONG TERM (CURRENT) USE OF AROMATASE INHIBITORS: ICD-10-CM

## 2024-04-29 DIAGNOSIS — C50.312 MALIGNANT NEOPLASM OF LOWER-INNER QUADRANT OF LEFT BREAST IN FEMALE, ESTROGEN RECEPTOR POSITIVE: Primary | ICD-10-CM

## 2024-04-29 DIAGNOSIS — C50.312 MALIGNANT NEOPLASM OF LOWER-INNER QUADRANT OF LEFT BREAST IN FEMALE, ESTROGEN RECEPTOR POSITIVE: ICD-10-CM

## 2024-04-29 LAB
ALBUMIN SERPL-MCNC: 3.6 G/DL (ref 3.5–5)
ALBUMIN/GLOB SERPL: 1.3 RATIO (ref 1.1–2)
ALP SERPL-CCNC: 98 UNIT/L (ref 40–150)
ALT SERPL-CCNC: 10 UNIT/L (ref 0–55)
AST SERPL-CCNC: 16 UNIT/L (ref 5–34)
BASOPHILS # BLD AUTO: 0.02 X10(3)/MCL
BASOPHILS NFR BLD AUTO: 0.3 %
BILIRUB SERPL-MCNC: 0.3 MG/DL
BUN SERPL-MCNC: 6.1 MG/DL (ref 7–18.7)
CALCIUM SERPL-MCNC: 8.7 MG/DL (ref 8.4–10.2)
CHLORIDE SERPL-SCNC: 105 MMOL/L (ref 98–107)
CO2 SERPL-SCNC: 27 MMOL/L (ref 22–29)
CREAT SERPL-MCNC: 0.76 MG/DL (ref 0.55–1.02)
EOSINOPHIL # BLD AUTO: 0.16 X10(3)/MCL (ref 0–0.9)
EOSINOPHIL NFR BLD AUTO: 2.7 %
ERYTHROCYTE [DISTWIDTH] IN BLOOD BY AUTOMATED COUNT: 12.2 % (ref 11.5–17)
GFR SERPLBLD CREATININE-BSD FMLA CKD-EPI: >60 MLS/MIN/1.73/M2
GLOBULIN SER-MCNC: 2.8 GM/DL (ref 2.4–3.5)
GLUCOSE SERPL-MCNC: 100 MG/DL (ref 74–100)
HCT VFR BLD AUTO: 39.7 % (ref 37–47)
HGB BLD-MCNC: 13.2 G/DL (ref 12–16)
IMM GRANULOCYTES # BLD AUTO: 0.02 X10(3)/MCL (ref 0–0.04)
IMM GRANULOCYTES NFR BLD AUTO: 0.3 %
LYMPHOCYTES # BLD AUTO: 1.63 X10(3)/MCL (ref 0.6–4.6)
LYMPHOCYTES NFR BLD AUTO: 27.6 %
MCH RBC QN AUTO: 31.3 PG (ref 27–31)
MCHC RBC AUTO-ENTMCNC: 33.2 G/DL (ref 33–36)
MCV RBC AUTO: 94.1 FL (ref 80–94)
MONOCYTES # BLD AUTO: 0.45 X10(3)/MCL (ref 0.1–1.3)
MONOCYTES NFR BLD AUTO: 7.6 %
NEUTROPHILS # BLD AUTO: 3.62 X10(3)/MCL (ref 2.1–9.2)
NEUTROPHILS NFR BLD AUTO: 61.5 %
PLATELET # BLD AUTO: 297 X10(3)/MCL (ref 130–400)
PMV BLD AUTO: 9.6 FL (ref 7.4–10.4)
POTASSIUM SERPL-SCNC: 4.1 MMOL/L (ref 3.5–5.1)
PROT SERPL-MCNC: 6.4 GM/DL (ref 6.4–8.3)
RBC # BLD AUTO: 4.22 X10(6)/MCL (ref 4.2–5.4)
SODIUM SERPL-SCNC: 139 MMOL/L (ref 136–145)
WBC # SPEC AUTO: 5.9 X10(3)/MCL (ref 4.5–11.5)

## 2024-04-29 PROCEDURE — 1160F RVW MEDS BY RX/DR IN RCRD: CPT | Mod: CPTII,S$GLB,, | Performed by: STUDENT IN AN ORGANIZED HEALTH CARE EDUCATION/TRAINING PROGRAM

## 2024-04-29 PROCEDURE — 3074F SYST BP LT 130 MM HG: CPT | Mod: CPTII,S$GLB,, | Performed by: STUDENT IN AN ORGANIZED HEALTH CARE EDUCATION/TRAINING PROGRAM

## 2024-04-29 PROCEDURE — 99215 OFFICE O/P EST HI 40 MIN: CPT | Mod: S$GLB,,, | Performed by: STUDENT IN AN ORGANIZED HEALTH CARE EDUCATION/TRAINING PROGRAM

## 2024-04-29 PROCEDURE — 36415 COLL VENOUS BLD VENIPUNCTURE: CPT

## 2024-04-29 PROCEDURE — 3078F DIAST BP <80 MM HG: CPT | Mod: CPTII,S$GLB,, | Performed by: STUDENT IN AN ORGANIZED HEALTH CARE EDUCATION/TRAINING PROGRAM

## 2024-04-29 PROCEDURE — 85025 COMPLETE CBC W/AUTO DIFF WBC: CPT

## 2024-04-29 PROCEDURE — 1159F MED LIST DOCD IN RCRD: CPT | Mod: CPTII,S$GLB,, | Performed by: STUDENT IN AN ORGANIZED HEALTH CARE EDUCATION/TRAINING PROGRAM

## 2024-04-29 PROCEDURE — 80053 COMPREHEN METABOLIC PANEL: CPT

## 2024-04-29 PROCEDURE — 99999 PR PBB SHADOW E&M-EST. PATIENT-LVL IV: CPT | Mod: PBBFAC,,, | Performed by: STUDENT IN AN ORGANIZED HEALTH CARE EDUCATION/TRAINING PROGRAM

## 2024-04-29 PROCEDURE — 3008F BODY MASS INDEX DOCD: CPT | Mod: CPTII,S$GLB,, | Performed by: STUDENT IN AN ORGANIZED HEALTH CARE EDUCATION/TRAINING PROGRAM

## 2024-04-29 RX ORDER — LETROZOLE 2.5 MG/1
2.5 TABLET, FILM COATED ORAL DAILY
Qty: 30 TABLET | Refills: 2 | Status: SHIPPED | OUTPATIENT
Start: 2024-04-29 | End: 2025-04-29

## 2024-05-06 ENCOUNTER — INFUSION (OUTPATIENT)
Dept: INFUSION THERAPY | Facility: HOSPITAL | Age: 44
End: 2024-05-06
Attending: STUDENT IN AN ORGANIZED HEALTH CARE EDUCATION/TRAINING PROGRAM
Payer: COMMERCIAL

## 2024-05-06 ENCOUNTER — LAB VISIT (OUTPATIENT)
Dept: LAB | Facility: HOSPITAL | Age: 44
End: 2024-05-06
Attending: STUDENT IN AN ORGANIZED HEALTH CARE EDUCATION/TRAINING PROGRAM
Payer: COMMERCIAL

## 2024-05-06 VITALS — DIASTOLIC BLOOD PRESSURE: 74 MMHG | HEART RATE: 96 BPM | SYSTOLIC BLOOD PRESSURE: 110 MMHG | RESPIRATION RATE: 16 BRPM

## 2024-05-06 DIAGNOSIS — C50.312 MALIGNANT NEOPLASM OF LOWER-INNER QUADRANT OF LEFT BREAST IN FEMALE, ESTROGEN RECEPTOR POSITIVE: Primary | ICD-10-CM

## 2024-05-06 DIAGNOSIS — Z17.0 MALIGNANT NEOPLASM OF LOWER-INNER QUADRANT OF LEFT BREAST IN FEMALE, ESTROGEN RECEPTOR POSITIVE: Primary | ICD-10-CM

## 2024-05-06 DIAGNOSIS — Z17.0 MALIGNANT NEOPLASM OF LOWER-INNER QUADRANT OF LEFT BREAST IN FEMALE, ESTROGEN RECEPTOR POSITIVE: ICD-10-CM

## 2024-05-06 DIAGNOSIS — C50.312 MALIGNANT NEOPLASM OF LOWER-INNER QUADRANT OF LEFT BREAST IN FEMALE, ESTROGEN RECEPTOR POSITIVE: ICD-10-CM

## 2024-05-06 LAB — B-HCG SERPL QL: NEGATIVE

## 2024-05-06 PROCEDURE — 63600175 PHARM REV CODE 636 W HCPCS: Mod: JZ,JG | Performed by: STUDENT IN AN ORGANIZED HEALTH CARE EDUCATION/TRAINING PROGRAM

## 2024-05-06 PROCEDURE — 96402 CHEMO HORMON ANTINEOPL SQ/IM: CPT

## 2024-05-06 PROCEDURE — 81025 URINE PREGNANCY TEST: CPT

## 2024-05-06 RX ADMIN — GOSERELIN ACETATE 3.6 MG: 3.6 IMPLANT SUBCUTANEOUS at 09:05

## 2024-05-15 ENCOUNTER — HOSPITAL ENCOUNTER (OUTPATIENT)
Dept: RADIOLOGY | Facility: HOSPITAL | Age: 44
Discharge: HOME | End: 2024-05-15
Attending: STUDENT IN AN ORGANIZED HEALTH CARE EDUCATION/TRAINING PROGRAM
Payer: COMMERCIAL

## 2024-05-15 DIAGNOSIS — C50.312 MALIGNANT NEOPLASM OF LOWER-INNER QUADRANT OF LEFT BREAST IN FEMALE, ESTROGEN RECEPTOR POSITIVE: ICD-10-CM

## 2024-05-15 DIAGNOSIS — Z17.0 MALIGNANT NEOPLASM OF LOWER-INNER QUADRANT OF LEFT BREAST IN FEMALE, ESTROGEN RECEPTOR POSITIVE: ICD-10-CM

## 2024-05-15 DIAGNOSIS — Z79.811 LONG TERM (CURRENT) USE OF AROMATASE INHIBITORS: ICD-10-CM

## 2024-05-15 PROCEDURE — 77080 DXA BONE DENSITY AXIAL: CPT | Mod: TC

## 2024-05-15 PROCEDURE — 77080 DXA BONE DENSITY AXIAL: CPT | Mod: 26,,, | Performed by: RADIOLOGY

## 2024-05-23 ENCOUNTER — HOSPITAL ENCOUNTER (EMERGENCY)
Facility: HOSPITAL | Age: 44
Discharge: HOME | End: 2024-05-23
Attending: STUDENT IN AN ORGANIZED HEALTH CARE EDUCATION/TRAINING PROGRAM
Payer: COMMERCIAL

## 2024-05-23 VITALS
HEIGHT: 64 IN | TEMPERATURE: 98 F | HEART RATE: 58 BPM | OXYGEN SATURATION: 100 % | DIASTOLIC BLOOD PRESSURE: 69 MMHG | RESPIRATION RATE: 20 BRPM | BODY MASS INDEX: 35.68 KG/M2 | WEIGHT: 209 LBS | SYSTOLIC BLOOD PRESSURE: 116 MMHG

## 2024-05-23 DIAGNOSIS — R56.9 SEIZURE-LIKE ACTIVITY: Primary | ICD-10-CM

## 2024-05-23 LAB
ALBUMIN SERPL-MCNC: 3.9 G/DL (ref 3.5–5)
ALBUMIN/GLOB SERPL: 1.2 RATIO (ref 1.1–2)
ALP SERPL-CCNC: 97 UNIT/L (ref 40–150)
ALT SERPL-CCNC: 12 UNIT/L (ref 0–55)
ANION GAP SERPL CALC-SCNC: 13 MEQ/L
AST SERPL-CCNC: 17 UNIT/L (ref 5–34)
BASOPHILS # BLD AUTO: 0.03 X10(3)/MCL
BASOPHILS NFR BLD AUTO: 0.3 %
BILIRUB SERPL-MCNC: 0.4 MG/DL
BUN SERPL-MCNC: 9.2 MG/DL (ref 7–18.7)
CALCIUM SERPL-MCNC: 9.3 MG/DL (ref 8.4–10.2)
CHLORIDE SERPL-SCNC: 107 MMOL/L (ref 98–107)
CO2 SERPL-SCNC: 21 MMOL/L (ref 22–29)
CREAT SERPL-MCNC: 0.93 MG/DL (ref 0.55–1.02)
CREAT/UREA NIT SERPL: 10
EOSINOPHIL # BLD AUTO: 0.11 X10(3)/MCL (ref 0–0.9)
EOSINOPHIL NFR BLD AUTO: 1.1 %
ERYTHROCYTE [DISTWIDTH] IN BLOOD BY AUTOMATED COUNT: 12.1 % (ref 11.5–17)
GFR SERPLBLD CREATININE-BSD FMLA CKD-EPI: >60 ML/MIN/1.73/M2
GLOBULIN SER-MCNC: 3.2 GM/DL (ref 2.4–3.5)
GLUCOSE SERPL-MCNC: 132 MG/DL (ref 70–110)
GLUCOSE SERPL-MCNC: 142 MG/DL (ref 74–100)
HCT VFR BLD AUTO: 44.3 % (ref 37–47)
HGB BLD-MCNC: 14.9 G/DL (ref 12–16)
IMM GRANULOCYTES # BLD AUTO: 0.04 X10(3)/MCL (ref 0–0.04)
IMM GRANULOCYTES NFR BLD AUTO: 0.4 %
LACTATE SERPL-SCNC: 2.1 MMOL/L (ref 0.5–2.2)
LACTATE SERPL-SCNC: 3.5 MMOL/L (ref 0.5–2.2)
LYMPHOCYTES # BLD AUTO: 2.17 X10(3)/MCL (ref 0.6–4.6)
LYMPHOCYTES NFR BLD AUTO: 22.6 %
MCH RBC QN AUTO: 30.8 PG (ref 27–31)
MCHC RBC AUTO-ENTMCNC: 33.6 G/DL (ref 33–36)
MCV RBC AUTO: 91.7 FL (ref 80–94)
MONOCYTES # BLD AUTO: 0.75 X10(3)/MCL (ref 0.1–1.3)
MONOCYTES NFR BLD AUTO: 7.8 %
NEUTROPHILS # BLD AUTO: 6.5 X10(3)/MCL (ref 2.1–9.2)
NEUTROPHILS NFR BLD AUTO: 67.8 %
NRBC BLD AUTO-RTO: 0 %
PLATELET # BLD AUTO: 396 X10(3)/MCL (ref 130–400)
PMV BLD AUTO: 10 FL (ref 7.4–10.4)
POTASSIUM SERPL-SCNC: 3.8 MMOL/L (ref 3.5–5.1)
PROT SERPL-MCNC: 7.1 GM/DL (ref 6.4–8.3)
RBC # BLD AUTO: 4.83 X10(6)/MCL (ref 4.2–5.4)
SODIUM SERPL-SCNC: 141 MMOL/L (ref 136–145)
TROPONIN I SERPL-MCNC: <0.01 NG/ML (ref 0–0.04)
WBC # SPEC AUTO: 9.6 X10(3)/MCL (ref 4.5–11.5)

## 2024-05-23 PROCEDURE — 84484 ASSAY OF TROPONIN QUANT: CPT | Performed by: STUDENT IN AN ORGANIZED HEALTH CARE EDUCATION/TRAINING PROGRAM

## 2024-05-23 PROCEDURE — 93005 ELECTROCARDIOGRAM TRACING: CPT

## 2024-05-23 PROCEDURE — 85025 COMPLETE CBC W/AUTO DIFF WBC: CPT | Performed by: STUDENT IN AN ORGANIZED HEALTH CARE EDUCATION/TRAINING PROGRAM

## 2024-05-23 PROCEDURE — 93010 ELECTROCARDIOGRAM REPORT: CPT | Mod: ,,, | Performed by: INTERNAL MEDICINE

## 2024-05-23 PROCEDURE — 82962 GLUCOSE BLOOD TEST: CPT

## 2024-05-23 PROCEDURE — 96361 HYDRATE IV INFUSION ADD-ON: CPT

## 2024-05-23 PROCEDURE — 63600175 PHARM REV CODE 636 W HCPCS: Performed by: STUDENT IN AN ORGANIZED HEALTH CARE EDUCATION/TRAINING PROGRAM

## 2024-05-23 PROCEDURE — 83605 ASSAY OF LACTIC ACID: CPT | Performed by: STUDENT IN AN ORGANIZED HEALTH CARE EDUCATION/TRAINING PROGRAM

## 2024-05-23 PROCEDURE — 96374 THER/PROPH/DIAG INJ IV PUSH: CPT

## 2024-05-23 PROCEDURE — 99285 EMERGENCY DEPT VISIT HI MDM: CPT | Mod: 25

## 2024-05-23 PROCEDURE — 25000003 PHARM REV CODE 250: Performed by: STUDENT IN AN ORGANIZED HEALTH CARE EDUCATION/TRAINING PROGRAM

## 2024-05-23 PROCEDURE — 80053 COMPREHEN METABOLIC PANEL: CPT | Performed by: STUDENT IN AN ORGANIZED HEALTH CARE EDUCATION/TRAINING PROGRAM

## 2024-05-23 RX ORDER — ONDANSETRON 4 MG/1
4 TABLET, ORALLY DISINTEGRATING ORAL
Status: DISCONTINUED | OUTPATIENT
Start: 2024-05-23 | End: 2024-05-23

## 2024-05-23 RX ORDER — ONDANSETRON HYDROCHLORIDE 2 MG/ML
4 INJECTION, SOLUTION INTRAVENOUS
Status: COMPLETED | OUTPATIENT
Start: 2024-05-23 | End: 2024-05-23

## 2024-05-23 RX ADMIN — ONDANSETRON 4 MG: 2 INJECTION INTRAMUSCULAR; INTRAVENOUS at 06:05

## 2024-05-23 RX ADMIN — SODIUM CHLORIDE 1000 ML: 9 INJECTION, SOLUTION INTRAVENOUS at 06:05

## 2024-05-24 LAB
OHS QRS DURATION: 74 MS
OHS QTC CALCULATION: 448 MS
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
## 2024-05-24 NOTE — DISCHARGE INSTRUCTIONS
No water unsupervised- bathing and swimming  Preferably take showers  No locked bathroom doors  No bathing while home alone  Keep a constant check on the seizure patient while in the water - some have suggested singing in the shower  Always wear a helmet when riding bikes and rollerblading. No bikes on busy streets and preferably always ride or skate with a stanley  Minimize burn risks in activities of daily living (stoves, irons, water temperature). Use the microwave instead of the stove whenever possible. Never cook when home alone.   Make careful decisions about leaving seizure patients alone for extended periods of time.   Avoid high places such as ladders, monkey bars, roofs, climbing trees.   No driving without physician permission. This must be discussed with your doctor.   No contact sports (boxing, tackle football, wrestling) without physician approval   Exercise regularly to maintain bone mass if at all possible.   Discuss seizure medication side effects with your doctor - inattention, sedation, or problems with balance may occur  Work aggressively with your doctor for complete seizure control   Consider a nursery monitor for use at night with children who sleep alone. We do not recommend having children sleep with parents just because of seizures.     Thanks for letting use take care of you today! It is our goal to give you courteous care and to keep you comfortable and informed. If you have any questions before you leave I will be happy to try and answer them.     Advice after your visit:  Your visit in the emergency department is NOT definitive care - please follow-up with your primary care doctor and/or specialist within 1-2 days. If you do not have a primary care physician call [phone] to schedule an appointment. Please return if you have any worsening in your condition or if you have any other concerns.    Return to the emergency department if any worsening symptoms including fever, chest pain,  difficulty breathing, weakness, numbness, tingling, nausea, vomiting, inability to eat, drink or take your medication, or any other new symptoms or concerns arise.      Please signup for MyChart as noted below in your paperwork to review all labwork, imaging results, and any other incidental findings from today's visit.     If you had radiology exams like an XRAY or CT in the emergency Department the interpreation on them may be preliminary - there may be less time sensitive findings on the reports please obtain these reports within 24 hours from the hospital or by using your out on your mobile phone to access records.  Bring these to your primary care doctor and/or specialist for further review of incidental findings.    Please review any LAB WORK from your visit today with your primary care physician.    If you were prescribed OPIATE PAIN MEDICATION - please understand of these medications can be addictive, you may fill less of the prescription was written for, you do not have to take the full prescription.  You may discard what you do not use.  Please seek help if you feel you are having problems with addiction.  Do not drive or operate heavy machinery if you are taking sedating medications.  Do not mix these medications with alcohol.      If you had a SPLINT placed in the emergency department if you have severe pain numbness tingling or discoloration of year digits please remove the splint and return to the emergency department for further evaluation as this may represent a sign of compromise to the nerves or blood vessels due to swelling.    If you had SUTURES in the emergency department please have them removed in the prescribed time frame typically within 7-14 days.  You may shower but please do not bathe or swim.  Keep the wounds clean and dry and covered with a clean dressing.  Please return if he have any signs of infection like redness or drainage or pain at the suture site.    Please take the full course  of  any ANTIBIOTICS you were prescribed - incomplete courses of antibiotics can cause resistance to antibiotics in the future which will make it difficult to treat any infections you may have.

## 2024-05-29 ENCOUNTER — LAB VISIT (OUTPATIENT)
Dept: LAB | Facility: HOSPITAL | Age: 44
End: 2024-05-29
Attending: STUDENT IN AN ORGANIZED HEALTH CARE EDUCATION/TRAINING PROGRAM
Payer: COMMERCIAL

## 2024-05-29 ENCOUNTER — OFFICE VISIT (OUTPATIENT)
Dept: HEMATOLOGY/ONCOLOGY | Facility: CLINIC | Age: 44
End: 2024-05-29
Payer: COMMERCIAL

## 2024-05-29 VITALS
BODY MASS INDEX: 35.99 KG/M2 | HEART RATE: 61 BPM | DIASTOLIC BLOOD PRESSURE: 75 MMHG | WEIGHT: 209.69 LBS | SYSTOLIC BLOOD PRESSURE: 107 MMHG | OXYGEN SATURATION: 99 % | TEMPERATURE: 98 F

## 2024-05-29 DIAGNOSIS — M85.80 OSTEOPENIA, UNSPECIFIED LOCATION: Primary | ICD-10-CM

## 2024-05-29 DIAGNOSIS — Z17.0 MALIGNANT NEOPLASM OF LOWER-INNER QUADRANT OF LEFT BREAST IN FEMALE, ESTROGEN RECEPTOR POSITIVE: ICD-10-CM

## 2024-05-29 DIAGNOSIS — Z79.811 LONG TERM (CURRENT) USE OF AROMATASE INHIBITORS: ICD-10-CM

## 2024-05-29 DIAGNOSIS — C50.312 MALIGNANT NEOPLASM OF LOWER-INNER QUADRANT OF LEFT BREAST IN FEMALE, ESTROGEN RECEPTOR POSITIVE: ICD-10-CM

## 2024-05-29 LAB
ALBUMIN SERPL-MCNC: 3.9 G/DL (ref 3.5–5)
ALBUMIN/GLOB SERPL: 1.3 RATIO (ref 1.1–2)
ALP SERPL-CCNC: 85 UNIT/L (ref 40–150)
ALT SERPL-CCNC: 13 UNIT/L (ref 0–55)
ANION GAP SERPL CALC-SCNC: 10 MEQ/L
AST SERPL-CCNC: 16 UNIT/L (ref 5–34)
BASOPHILS # BLD AUTO: 0.01 X10(3)/MCL
BASOPHILS NFR BLD AUTO: 0.1 %
BILIRUB SERPL-MCNC: 0.3 MG/DL
BUN SERPL-MCNC: 10.7 MG/DL (ref 7–18.7)
CALCIUM SERPL-MCNC: 9.2 MG/DL (ref 8.4–10.2)
CHLORIDE SERPL-SCNC: 103 MMOL/L (ref 98–107)
CO2 SERPL-SCNC: 27 MMOL/L (ref 22–29)
CREAT SERPL-MCNC: 0.79 MG/DL (ref 0.55–1.02)
CREAT/UREA NIT SERPL: 14
EOSINOPHIL # BLD AUTO: 0.1 X10(3)/MCL (ref 0–0.9)
EOSINOPHIL NFR BLD AUTO: 1.5 %
ERYTHROCYTE [DISTWIDTH] IN BLOOD BY AUTOMATED COUNT: 12.2 % (ref 11.5–17)
GFR SERPLBLD CREATININE-BSD FMLA CKD-EPI: >60 ML/MIN/1.73/M2
GLOBULIN SER-MCNC: 3 GM/DL (ref 2.4–3.5)
GLUCOSE SERPL-MCNC: 90 MG/DL (ref 74–100)
HCT VFR BLD AUTO: 44.3 % (ref 37–47)
HGB BLD-MCNC: 14.4 G/DL (ref 12–16)
IMM GRANULOCYTES # BLD AUTO: 0.01 X10(3)/MCL (ref 0–0.04)
IMM GRANULOCYTES NFR BLD AUTO: 0.1 %
LYMPHOCYTES # BLD AUTO: 1.18 X10(3)/MCL (ref 0.6–4.6)
LYMPHOCYTES NFR BLD AUTO: 17.3 %
MCH RBC QN AUTO: 30.4 PG (ref 27–31)
MCHC RBC AUTO-ENTMCNC: 32.5 G/DL (ref 33–36)
MCV RBC AUTO: 93.5 FL (ref 80–94)
MONOCYTES # BLD AUTO: 0.43 X10(3)/MCL (ref 0.1–1.3)
MONOCYTES NFR BLD AUTO: 6.3 %
NEUTROPHILS # BLD AUTO: 5.09 X10(3)/MCL (ref 2.1–9.2)
NEUTROPHILS NFR BLD AUTO: 74.7 %
PLATELET # BLD AUTO: 304 X10(3)/MCL (ref 130–400)
PMV BLD AUTO: 9.5 FL (ref 7.4–10.4)
POTASSIUM SERPL-SCNC: 4.1 MMOL/L (ref 3.5–5.1)
PROT SERPL-MCNC: 6.9 GM/DL (ref 6.4–8.3)
RBC # BLD AUTO: 4.74 X10(6)/MCL (ref 4.2–5.4)
SODIUM SERPL-SCNC: 140 MMOL/L (ref 136–145)
WBC # SPEC AUTO: 6.82 X10(3)/MCL (ref 4.5–11.5)

## 2024-05-29 PROCEDURE — 80053 COMPREHEN METABOLIC PANEL: CPT

## 2024-05-29 PROCEDURE — 1159F MED LIST DOCD IN RCRD: CPT | Mod: CPTII,S$GLB,,

## 2024-05-29 PROCEDURE — 3008F BODY MASS INDEX DOCD: CPT | Mod: CPTII,S$GLB,,

## 2024-05-29 PROCEDURE — 3074F SYST BP LT 130 MM HG: CPT | Mod: CPTII,S$GLB,,

## 2024-05-29 PROCEDURE — 85025 COMPLETE CBC W/AUTO DIFF WBC: CPT

## 2024-05-29 PROCEDURE — 36415 COLL VENOUS BLD VENIPUNCTURE: CPT

## 2024-05-29 PROCEDURE — 99999 PR PBB SHADOW E&M-EST. PATIENT-LVL III: CPT | Mod: PBBFAC,,,

## 2024-05-29 PROCEDURE — 3078F DIAST BP <80 MM HG: CPT | Mod: CPTII,S$GLB,,

## 2024-05-29 PROCEDURE — 99215 OFFICE O/P EST HI 40 MIN: CPT | Mod: S$GLB,,,

## 2024-05-29 RX ORDER — FEZOLINETANT 45 MG/1
45 TABLET, FILM COATED ORAL DAILY
COMMUNITY

## 2024-05-29 NOTE — PROGRESS NOTES
Subjective:       Patient ID: Brittnee Wakefield is a 44 y.o. female.    Chief Complaint: Follow up    Diagnosis:  Left Breast Cancer - ER+, OK+, HER 2-    Current Treatment:   Zoladex + Letrozole -  5/8/24- present  Plan for Prolia q6 months pending insurance approval    Treatment History:   2/19/2024 Left Lumpectomy with Left [place] Lymph Node Biopsy - Dr. Dumont  Adjuvant XRT - 4/1/24 - 4/19/24    HPI:   43 yo F presents in March '24 for evaluation of Hormone positive Left Breast Cancer. She began to feel more tired and run down in the fall '23, therefore PCP recommended she undergo her age appropriate cancer screenings prompting her for screening MMG. At that time she was noted in the left breast to have 2 abnormal masses detected at the 8-9 o'clock and 11-12 o'clock positions. Follow up imaging revealed numerous enhancing masses throughout the left breast on DG MMG/US. She underwent breast MRI in January '24 which revealed 3 masses, all around 1cm in her left breast at different locations. She underwent biopsy and ultimately 1 of the 3 returned malignant, the other 2 were biopsy proven benign fibroadenoma changes. She ultimately had a 1.3 x 0.9 x 0.7cm irregular enhancing mass with spiculated margins at the 8 o'clock position 5 CMFN. Biopsy revealed invasive ductal carcinoma, G1, ER 72%, OK 84%, HER 2 1+, Ki67 10%. She underwent Left Breast Lumpectomy with SLNBx with Dr. Greenberg on 2/19/24. Final path consistent with 9mm of IDC measuring 9mm. 0/3 lymph nodes involved with metastatic disease. Surgical oncology had an oncotype performed which had a score of 17. She is pre menopausal at the time of diagnosis.     Her diagnosis, staging, and prognosis were discussed. The NCCN guidelines when discussing her therapy moving forward were referenced. We reviewed her oncotype in great detail given she is a T1b grade 1 lesion her tumor was not studied in the TailoRx trial, however with an oncotype of 17 there is a 1.6%  benefit to chemotherapy in women <51yo, but it is not clear if this benefit was from chemotherapy itself or the resulting ovarian suppression. She expressed understanding. She ultimately decided to forego chemotherapy.     Interval History:  Patient presents to clinic today for NP follow up appointment and labs to discuss results and plan of treatment.   She is doing well today.  Tolerating Letrozole without issue.  Reports having a menstrual cycle for 6 days two weeks after 1st dose of Zoladex.  She was started on Veozah as a preventative measure for hot flashes.   She denies any hot flashes, arthralgias, new breast pains or palpable masses.  Discussed labs, DEXA results, Prolia its role and toxicities in detail.   She is agreeable to proceed and verbalized understanding. Educational handouts provided  Appetite and energy levels have been stable.  She has no complaints or concerns today.        Past Medical History:   Diagnosis Date    Anxiety     Cancer     breast left    History of seizures     At the age of 3yo      Past Surgical History:   Procedure Laterality Date     SECTION      MASTECTOMY, PARTIAL Left 2024    Procedure: MASTECTOMY, PARTIAL - LEFT Need Sentimag Need Faxitron Need sterile charms and sterile ink;  Surgeon: Teri Greenberg MD;  Location: [facility];  Service: General;  Laterality: Left;  Need Sentimag  Need Faxitron  Need sterile charms and sterile ink    SENTINEL LYMPH NODE BIOPSY Left 2024    Procedure: BIOPSY, LYMPH NODE, SENTINEL - LEFT  need nuc med need marco antonio node;  Surgeon: Teri Greenberg MD;  Location: [facility];  Service: General;  Laterality: Left;  need nuc med  need marco antonio node     Social History     Socioeconomic History    Marital status:    Tobacco Use    Smoking status: Former     Types: Cigarettes    Smokeless tobacco: Never   Substance and Sexual Activity    Alcohol use: Not Currently    Drug use: Never    Sexual activity: Yes     Partners: Male      Birth control/protection: Condom      Family History   Problem Relation Name Age of Onset    Liver cancer Father      Hepatitis Maternal Grandmother      Cancer Maternal Grandmother      Breast cancer Maternal Aunt Claudia Ho 69    Cancer Maternal Aunt Claudia Ho     Brain cancer Maternal Cousin  28      Review of patient's allergies indicates:   Allergen Reactions    Adhesive      Skin tear      Review of Systems   Constitutional:  Negative for activity change, fever and unexpected weight change.   HENT:  Negative for sore throat.    Eyes:  Negative for visual disturbance.   Respiratory:  Negative for cough and shortness of breath.    Cardiovascular:  Negative for chest pain.   Gastrointestinal:  Negative for abdominal pain, diarrhea, nausea and vomiting.   Endocrine: Negative for polyuria.   Genitourinary:  Negative for dysuria and hot flashes.   Integumentary:  Negative for rash.   Neurological:  Negative for weakness and headaches.   Hematological:  Negative for adenopathy.   Psychiatric/Behavioral:  Negative for confusion.          Objective:      Vitals:    05/29/24 1307   BP: 107/75   Pulse: 61   Temp: 97.7 °F (36.5 °C)     Physical Exam  Constitutional:       General: She is not in acute distress.     Appearance: Normal appearance. She is not ill-appearing.   HENT:      Head: Normocephalic and atraumatic.      Nose: Nose normal.      Mouth/Throat:      Mouth: Mucous membranes are moist.      Pharynx: Oropharynx is clear.   Eyes:      Extraocular Movements: Extraocular movements intact.      Conjunctiva/sclera: Conjunctivae normal.      Pupils: Pupils are equal, round, and reactive to light.   Cardiovascular:      Rate and Rhythm: Normal rate and regular rhythm.      Pulses: Normal pulses.      Heart sounds: Normal heart sounds. No murmur heard.  Pulmonary:      Effort: Pulmonary effort is normal. No respiratory distress.      Breath sounds: Normal breath sounds.   Abdominal:      General: There is no  distension.      Palpations: Abdomen is soft.      Tenderness: There is no abdominal tenderness.   Musculoskeletal:         General: Normal range of motion.      Cervical back: Normal range of motion and neck supple.      Right lower leg: No edema.      Left lower leg: No edema.   Lymphadenopathy:      Cervical: No cervical adenopathy.   Skin:     General: Skin is warm and dry.   Neurological:      General: No focal deficit present.      Mental Status: She is alert and oriented to person, place, and time.       LABS AND IMAGING REVIEWED IN EPIC     Imagin2023 BL SC MG at [facility]- which revealed in L breast at 8 o'clock -9 o'clock middle depth, there is an irregular mass with spiculated margins and associated architectural distortion.  At 11 o'clock- 12 o'clock L breast anterior depth, there is an oval mass seen.   Additionally, there is an oval mass in the L breast upper outer quadrant middle depth.   No significant masses, calcifications, or other findings are seen in the R breast. BIRADS-0; additional imaging needed     2. 2023 L DG MG/ L US BREAST COMPLETE at [facility]- which revealed on MG:       a. At 8 o'clock middle depth, there is a persistent 0.8 cm irregular equal density mass with spiculated margins and associated architectural distortion (spanning 4.5 cm)        b. At 11 o'clock anterior depth, there is a persistent 1.0 cm oval high density mass with circumscribed margins,         c. At 1 o'clock middle depth, , there is a 0.9 cm oval equal density mass with circumscribed margins      On L US:         a. At 8 o'clock 5 cm FN,  there is a 0.8 cm x 0.7 cm x 0.7 cm irregular avascular hypoechoic mass with spiculated margins, posterior shadowing, and associated architectural distortion           b  At 11 o'clock 3 cm FN, there is a 1.0 cm x 0.6 cm x 0.8 cm oval avascular hypoechoic mass with angular margins and posterior shadowing           c. At 1 o'clock  3 cm FN, there is a 0.9 cm x 0.4 cm x 0.8  cm parallel oval avascular hypoechoic mass with circumscribed margins   BIRADS-5 highly suspicious;biopsy recommended.     3. 1/31/2024 BL BREAST MRI- which revealed       Left Breast-        A. At 8 o'clock 5 cm FN,  there is a 1.3 x 0.9 x 0.7 cm irregular, enhancing mass with spiculated margins (biopsy proven malignancy)        B. At 1 o'clock 3 cm FN, there is a 0.9 x 0.5 x 1.0 cm oval, enhancing mass with circumscribed margins (biopsy proven benign fibroadenomatoid change and sclerosing adenosis)         C. At 11 o'clock 3 cm FN, there is a 1.0 x 0.9 x 0.8 cm oval, enhancing mass with circumscribed margins.(biopsy proven fibroadenoma/fibroadenomatoid change)         Right Breast- .no suspicious areas of enhancement or areas of architectural distortion are seen.  There is no skin thickening or nipple retraction.  No axillary or internal mammary lymphadenopathy is identified.     BIRADS-6 Known biopsy proven malignancy    DEXA 5/15/2024  Impression:   Low bone mass.      Pathology:  1/16/2024 Ultrasound-guided Core Needle Biopsy Left Breast 8 o'clock 5 cm FN-        - Invasive ductal carcinoma, grade 1 (measuring 9.0 mm)        - Ductal carcinoma in situ, grade 2 without necrosis          ER 72%          AZ 84%          HER 2 ezio 1+          Ki67 10%     2. 1/16/2024 Ultrasound-guided Core Needle Biopsy Left Breast 11 o'clock 3 cm FN-        - Fibroadenoma/ fibroadenomatoid change    3. 1/16/2024 Ultrasound-guided Core Needle Biopsy Left Breast 1 o'clock 3 cm FN-        - Fibroadenomatoid change and sclerosing adenosis    4. 2/19/2024 Left Lumpectomy with Left [place] Lymph Node Biopsy which revealed invasive ductal carcinoma, grade 1 (measuring 0.9 cm); and lobular carcinoma in situ.  All margins clear.  Three sentinel lymph nodes negative for metastaic carcinoma.       Assessment:   Stage 1A Left Hormone Positive Breast Cancer - T1bN0, G1. Oncotype 17. Discussed risks/benefits/toxicity/role for chemotherapy and  she chose to not pursue adjuvant chemotherapy. She finished adjuvant XRT in April '24. Started on OS + AI in Feb '24 for planned 5-10 years.       Plan:     - Toxicity reviewed; okay to continue Letrozole  - Continue monthly  Zoladex; next due 6/6  - Continue Calcium and Vitamin D  - Prolia IM Q6 months; pending dental clearance; form provided today  - Instructed to call the office for break through bleeding   - RTC for NP visit in 3 months, labs same day           I spent a total of 40 minutes on the day of the visit.This includes face to face time and non-face to face time preparing to see the patient (eg, review of tests), obtaining and/or reviewing separately obtained history, documenting clinical information in the electronic or other health record, independently interpreting results and communicating results to the patient/family/caregiver, or care coordinator.          TATE Daniels-C  Hematology/Oncology   [facility]

## 2024-05-30 ENCOUNTER — OFFICE VISIT (OUTPATIENT)
Dept: SURGERY | Facility: CLINIC | Age: 44
End: 2024-05-30
Payer: COMMERCIAL

## 2024-05-30 VITALS
SYSTOLIC BLOOD PRESSURE: 111 MMHG | OXYGEN SATURATION: 96 % | WEIGHT: 211 LBS | BODY MASS INDEX: 36.02 KG/M2 | HEIGHT: 64 IN | TEMPERATURE: 98 F | RESPIRATION RATE: 18 BRPM | DIASTOLIC BLOOD PRESSURE: 78 MMHG | HEART RATE: 82 BPM

## 2024-05-30 DIAGNOSIS — Z17.0 MALIGNANT NEOPLASM OF LOWER-INNER QUADRANT OF LEFT BREAST IN FEMALE, ESTROGEN RECEPTOR POSITIVE: Primary | ICD-10-CM

## 2024-05-30 DIAGNOSIS — C50.312 MALIGNANT NEOPLASM OF LOWER-INNER QUADRANT OF LEFT BREAST IN FEMALE, ESTROGEN RECEPTOR POSITIVE: Primary | ICD-10-CM

## 2024-05-30 DIAGNOSIS — D05.01 LOBULAR CARCINOMA IN SITU (LCIS) OF RIGHT BREAST: ICD-10-CM

## 2024-05-30 DIAGNOSIS — Z98.890 STATUS POST LYMPH NODE BIOPSY: ICD-10-CM

## 2024-05-30 DIAGNOSIS — F41.1 GENERALIZED ANXIETY DISORDER: ICD-10-CM

## 2024-05-30 DIAGNOSIS — Z90.12 STATUS POST PARTIAL MASTECTOMY OF LEFT BREAST: ICD-10-CM

## 2024-05-30 PROCEDURE — 3078F DIAST BP <80 MM HG: CPT | Mod: CPTII,S$GLB,, | Performed by: PHYSICIAN ASSISTANT

## 2024-05-30 PROCEDURE — 3008F BODY MASS INDEX DOCD: CPT | Mod: CPTII,S$GLB,, | Performed by: PHYSICIAN ASSISTANT

## 2024-05-30 PROCEDURE — 99214 OFFICE O/P EST MOD 30 MIN: CPT | Mod: S$GLB,,, | Performed by: PHYSICIAN ASSISTANT

## 2024-05-30 PROCEDURE — 1159F MED LIST DOCD IN RCRD: CPT | Mod: CPTII,S$GLB,, | Performed by: PHYSICIAN ASSISTANT

## 2024-05-30 PROCEDURE — 1160F RVW MEDS BY RX/DR IN RCRD: CPT | Mod: CPTII,S$GLB,, | Performed by: PHYSICIAN ASSISTANT

## 2024-05-30 PROCEDURE — 99999 PR PBB SHADOW E&M-EST. PATIENT-LVL IV: CPT | Mod: PBBFAC,,, | Performed by: PHYSICIAN ASSISTANT

## 2024-05-30 PROCEDURE — 3074F SYST BP LT 130 MM HG: CPT | Mod: CPTII,S$GLB,, | Performed by: PHYSICIAN ASSISTANT

## 2024-05-30 NOTE — PROGRESS NOTES
[facility] Breast Surg  Breast Surgical Oncology  Follow-Up Patient Office Visit       Referring Provider: No ref. provider found  PCP: Keyla Snowden MD   Medical Oncologist: No care team member to display   Radiation Oncologist: Billie Butts MD   Other Care Providers:     Chief Complaint:   Chief Complaint   Patient presents with    Follow-up     Patient c/o left nipple skin changes x 1 month no redness, no swelling, no nipple discharge         Subjective:   Treatment History:  2024 Left Lumpectomy with Left SLNB   2.   24 - 24 Adjuvant radiation to left breast  3.   24 Zoladex + Letrozole started     Interval History:  2024 - Brittnee Wakefield presents today for her 3 month follow up. She is doing well since surgery. She completed radiation therapy and started endocrine therapy which she is tolerating well. She has no breast concerns. She is a little anxious today because her son was in a car accident last week and is having surgery today.    HPI:  Brittnee Wakefield is a 44 y.o. female who presents on 2024 for evaluation of newly diagnosed left  breast cancer.     A detailed patient history was obtained and reviewed. She currently denies any breast issues including rashes, redness, pain, swelling, nipple discharge, or new lumps/masses.     MG breast density: Category C (heterogeneously dense)      Imagin2023 BL SC MG at [facility]- which revealed in L breast at 8 o'clock -9 o'clock middle depth, there is an irregular mass with spiculated margins and associated architectural distortion.  At 11 o'clock- 12 o'clock L breast anterior depth, there is an oval mass seen.   Additionally, there is an oval mass in the L breast upper outer quadrant middle depth.   No significant masses, calcifications, or other findings are seen in the R breast. BIRADS-0; additional imaging needed     2. 2023 L DG MG/ L US BREAST COMPLETE at [facility]- which revealed on MG:        a. At 8 o'clock middle depth, there is a persistent 0.8 cm irregular equal density mass with spiculated margins and associated architectural distortion (spanning 4.5 cm)        b. At 11 o'clock anterior depth, there is a persistent 1.0 cm oval high density mass with circumscribed margins,         c. At 1 o'clock middle depth, , there is a 0.9 cm oval equal density mass with circumscribed margins      On L US:         a. At 8 o'clock 5 cm FN,  there is a 0.8 cm x 0.7 cm x 0.7 cm irregular avascular hypoechoic mass with spiculated margins, posterior shadowing, and associated architectural distortion           b  At 11 o'clock 3 cm FN, there is a 1.0 cm x 0.6 cm x 0.8 cm oval avascular hypoechoic mass with angular margins and posterior shadowing           c. At 1 o'clock  3 cm FN, there is a 0.9 cm x 0.4 cm x 0.8 cm parallel oval avascular hypoechoic mass with circumscribed margins   BIRADS-5 highly suspicious;biopsy recommended.    3. 1/31/2024 BL BREAST MRI- which revealed       Left Breast-        A. At 8 o'clock 5 cm FN,  there is a 1.3 x 0.9 x 0.7 cm irregular, enhancing mass with spiculated margins (biopsy proven malignancy)        B. At 1 o'clock 3 cm FN, there is a 0.9 x 0.5 x 1.0 cm oval, enhancing mass with circumscribed margins (biopsy proven benign fibroadenomatoid change and sclerosing adenosis)         C. At 11 o'clock 3 cm FN, there is a 1.0 x 0.9 x 0.8 cm oval, enhancing mass with circumscribed margins.(biopsy proven fibroadenoma/fibroadenomatoid change)         Right Breast- .no suspicious areas of enhancement or areas of architectural distortion are seen.  There is no skin thickening or nipple retraction.  No axillary or internal mammary lymphadenopathy is identified.     BIRADS-6 Known biopsy proven malignancy          Pathology:  1/16/2024 Ultrasound-guided Core Needle Biopsy Left Breast 8 o'clock 5 cm FN-        - Invasive ductal carcinoma, grade 1 (measuring 9.0 mm)        - Ductal carcinoma  in situ, grade 2 without necrosis          ER 72%          HI 84%          HER 2 ezio 1+          Ki67 10%     2. 2024 Ultrasound-guided Core Needle Biopsy Left Breast 11 o'clock 3 cm FN-        - Fibroadenoma/ fibroadenomatoid change  3. 2024 Ultrasound-guided Core Needle Biopsy Left Breast 1 o'clock 3 cm FN-        - Fibroadenomatoid change and sclerosing adenosis  4. 2024 Left Lumpectomy with Left [place] Lymph Node Biopsy which revealed invasive ductal carcinoma, grade 1 (measuring 0.9 cm); and lobular carcinoma in situ.  All margins clear.  Three sentinel lymph nodes negative for metastaic carcinoma.        OB/GYN History:  Age at Menarche Onset: 13  Menopausal Status: premenopausal, LMP: No LMP recorded. Patient is perimenopausal.  Hysterectomy/Oophorectomy: NA, at age NA  Hormonal birth control (duration): Yes OCP (estrogen/progesterone).   Pregnancy History:   Age at first live birth: 25  Hormone Replacement Therapy: No, none  Patient did not breast feed.  Patient denies nipple discharge.   Patient denies to previous breast biopsy.   Patient denies to a personal history of breast cancer.     Other Relevant History:  Prior thoracic RT: none  Genetic testing: No  Ashkenazi Restorationist descent: No     Family History:  Family History   Problem Relation Name Age of Onset    Liver cancer Father      Hepatitis Maternal Grandmother      Cancer Maternal Grandmother      Breast cancer Maternal Aunt Claudia Ho 69    Cancer Maternal Aunt Claudia Ho     Brain cancer Maternal Cousin  28        Past History:  Past Medical History:   Diagnosis Date    Anxiety     Cancer     breast left    History of seizures     At the age of 1yo        Past Surgical History:   Procedure Laterality Date     SECTION      MASTECTOMY, PARTIAL Left 2024    Procedure: MASTECTOMY, PARTIAL - LEFT Need Sentimag Need Faxitron Need sterile charms and sterile ink;  Surgeon: Teri Greenberg MD;  Location: [facility] OR;   "Service: General;  Laterality: Left;  Need Sentimag  Need Faxitron  Need sterile charms and sterile ink    SENTINEL LYMPH NODE BIOPSY Left 2/19/2024    Procedure: BIOPSY, LYMPH NODE, SENTINEL - LEFT  need nuc med need marco antonio node;  Surgeon: Teri Greenberg MD;  Location: [facility] OR;  Service: General;  Laterality: Left;  need nuc med  need marco antonio node        Social History     Socioeconomic History    Marital status:    Tobacco Use    Smoking status: Former     Types: Cigarettes    Smokeless tobacco: Never   Substance and Sexual Activity    Alcohol use: Not Currently    Drug use: Never    Sexual activity: Yes     Partners: Male     Birth control/protection: Condom        Body mass index is 36.22 kg/m².     Allergy/Medications:   Review of patient's allergies indicates:   Allergen Reactions    Adhesive      Skin tear          Current Outpatient Medications:     fezolinetant (VEOZAH) 45 mg Tab, Take 45 mg by mouth once daily., Disp: , Rfl:     FLUoxetine 20 MG capsule, Take 1 capsule (20 mg total) by mouth once daily., Disp: 90 capsule, Rfl: 3    letrozole (FEMARA) 2.5 mg Tab, Take 1 tablet (2.5 mg total) by mouth once daily., Disp: 30 tablet, Rfl: 2    diphenhydrAMINE (BENADRYL) 25 mg capsule, Take 25 mg by mouth every 6 (six) hours as needed for Itching., Disp: , Rfl:     ondansetron (ZOFRAN) 4 MG tablet, Take 1 tablet (4 mg total) by mouth every 6 (six) hours., Disp: 12 tablet, Rfl: 0       Review of Systems:  Review of Systems   All other systems reviewed and are negative.          Objective:     Vitals:  Blood pressure 111/78, pulse 82, temperature 97.7 °F (36.5 °C), temperature source Oral, resp. rate 18, height 5' 4" (1.626 m), weight 95.7 kg (211 lb), last menstrual period 05/21/2024, SpO2 96%.      Physical Exam:  General: The patient is awake, alert and oriented times three. The patient is well nourished and in no acute distress.  Neck: There is no evidence of palpable cervical, supraclavicular or axillary " adenopathy. The neck is supple. The thyroid is not enlarged.  Musculoskeletal: The patient has a normal range of motion of her bilateral upper extremities.  Chest: Examination of the chest wall fails to reveal any obvious abnormalities. Nonlabored breathing, symmetric expansion.  Breast:  Right:  Examination of right breast fails to reveal any dominant masses or areas of significant focal nodularity. The nipple is everted without evidence of discharge. There is no skin dimpling with movement of the pectoralis. There are no significant skin changes overlying the breast.   Left: Well healed periareolar scar. Well healed scar in the axilla. Mild hyperpigmentation noted to the entire left breast from radiation therapy. Examination of the left breast fails to reveal any dominant masses or areas of significant focal nodularity. The nipple is everted without evidence of discharge. There is no skin dimpling with movement of the pectoralis. There are no significant skin changes overlying the breast.  Abdomen: The abdomen is soft, flat, nontender and nondistended.  Integumentary: no rashes or skin lesions present  Neurologic: cranial nerves intact, no signs of peripheral neurological deficit, motor/sensory function intact      Assessment and Plan:     Encounter Diagnoses   Name Primary?    Malignant neoplasm of lower-inner quadrant of left breast in female, estrogen receptor positive Yes    Lobular carcinoma in situ (LCIS) of right breast     Generalized anxiety disorder     Status post partial mastectomy of left breast     Status post lymph node biopsy                  Plan:       BL DG MG recommended in October, 6 months s/p radiation therapy.    RTC in 6 months for CBE.    Continue adjuvant endocrine therapy and follow up with oncology for management.    Continue follow up with PCP for management of anxiety.      All of her questions were answered. She was advised to call if she develops any questions or  concerns.    Sofia Hauser PA-C           Total time on the date of the visit ranged from 30-39 mins (99720). Total time includes both face-to-face and non-face-to-face time personally spent by myself on the day of the visit.    Non-face-to-face time included:  _X_ preparing to see the patient such as reviewing the patient record  __ obtaining and reviewing separately obtained history  _X_ independently interpreting results  _X_ documenting clinical information in electronic health record.    Face-to-face time included:  _X_ performing an appropriate history and examination  _X_ communicating results to the patient  _X_ counseling and educating the patient  __ ordering appropriate medications  _x_ ordering appropriate tests  _X_ ordering appropriate procedures (including follow-up)  _X_ answering any questions the patient had    Total Time spent on date of visit: 35 minutes

## 2024-06-05 DIAGNOSIS — Z17.0 MALIGNANT NEOPLASM OF LOWER-INNER QUADRANT OF LEFT BREAST IN FEMALE, ESTROGEN RECEPTOR POSITIVE: ICD-10-CM

## 2024-06-05 DIAGNOSIS — Z79.811 LONG TERM (CURRENT) USE OF AROMATASE INHIBITORS: Primary | ICD-10-CM

## 2024-06-05 DIAGNOSIS — C50.312 MALIGNANT NEOPLASM OF LOWER-INNER QUADRANT OF LEFT BREAST IN FEMALE, ESTROGEN RECEPTOR POSITIVE: ICD-10-CM

## 2024-06-06 ENCOUNTER — INFUSION (OUTPATIENT)
Dept: INFUSION THERAPY | Facility: HOSPITAL | Age: 44
End: 2024-06-06
Attending: STUDENT IN AN ORGANIZED HEALTH CARE EDUCATION/TRAINING PROGRAM
Payer: COMMERCIAL

## 2024-06-06 ENCOUNTER — LAB VISIT (OUTPATIENT)
Dept: LAB | Facility: HOSPITAL | Age: 44
End: 2024-06-06
Attending: STUDENT IN AN ORGANIZED HEALTH CARE EDUCATION/TRAINING PROGRAM
Payer: COMMERCIAL

## 2024-06-06 VITALS
DIASTOLIC BLOOD PRESSURE: 78 MMHG | OXYGEN SATURATION: 98 % | SYSTOLIC BLOOD PRESSURE: 125 MMHG | TEMPERATURE: 98 F | HEART RATE: 70 BPM

## 2024-06-06 DIAGNOSIS — C50.312 MALIGNANT NEOPLASM OF LOWER-INNER QUADRANT OF LEFT BREAST IN FEMALE, ESTROGEN RECEPTOR POSITIVE: ICD-10-CM

## 2024-06-06 DIAGNOSIS — Z17.0 MALIGNANT NEOPLASM OF LOWER-INNER QUADRANT OF LEFT BREAST IN FEMALE, ESTROGEN RECEPTOR POSITIVE: ICD-10-CM

## 2024-06-06 DIAGNOSIS — C50.312 MALIGNANT NEOPLASM OF LOWER-INNER QUADRANT OF LEFT BREAST IN FEMALE, ESTROGEN RECEPTOR POSITIVE: Primary | ICD-10-CM

## 2024-06-06 DIAGNOSIS — M85.80 OSTEOPENIA, UNSPECIFIED LOCATION: ICD-10-CM

## 2024-06-06 DIAGNOSIS — Z17.0 MALIGNANT NEOPLASM OF LOWER-INNER QUADRANT OF LEFT BREAST IN FEMALE, ESTROGEN RECEPTOR POSITIVE: Primary | ICD-10-CM

## 2024-06-06 DIAGNOSIS — Z79.811 LONG TERM (CURRENT) USE OF AROMATASE INHIBITORS: ICD-10-CM

## 2024-06-06 LAB — B-HCG UR QL: NEGATIVE

## 2024-06-06 PROCEDURE — 96401 CHEMO ANTI-NEOPL SQ/IM: CPT

## 2024-06-06 PROCEDURE — 63600175 PHARM REV CODE 636 W HCPCS: Mod: JZ,JG | Performed by: STUDENT IN AN ORGANIZED HEALTH CARE EDUCATION/TRAINING PROGRAM

## 2024-06-06 PROCEDURE — 81025 URINE PREGNANCY TEST: CPT

## 2024-06-06 RX ADMIN — GOSERELIN ACETATE 3.6 MG: 3.6 IMPLANT SUBCUTANEOUS at 01:06

## 2024-06-18 ENCOUNTER — TELEPHONE (OUTPATIENT)
Dept: HEMATOLOGY/ONCOLOGY | Facility: CLINIC | Age: 44
End: 2024-06-18
Payer: COMMERCIAL

## 2024-06-18 NOTE — TELEPHONE ENCOUNTER
Guillermo notified and voiced understanding. She will fax over clearance once patient has had dental work done.

## 2024-06-18 NOTE — TELEPHONE ENCOUNTER
Patient went in for dental clearance yesterday to start receiving prolia injections. She is in need of a cleaning and a few fillings. Dr. Tapia would like the okay to proceed with the dental work before giving her clearance. Please advise if okay.

## 2024-06-27 ENCOUNTER — TELEPHONE (OUTPATIENT)
Dept: HEMATOLOGY/ONCOLOGY | Facility: CLINIC | Age: 44
End: 2024-06-27
Payer: COMMERCIAL

## 2024-06-27 NOTE — TELEPHONE ENCOUNTER
Patient would like to know if it would be possible to have a hysterectomy instead of continuing with the zoladex injections. States she if fine taking pills, just does not wish to continue with injections if at all possible. Please advise.

## 2024-07-02 ENCOUNTER — LAB VISIT (OUTPATIENT)
Dept: LAB | Facility: HOSPITAL | Age: 44
End: 2024-07-02
Attending: STUDENT IN AN ORGANIZED HEALTH CARE EDUCATION/TRAINING PROGRAM
Payer: COMMERCIAL

## 2024-07-02 ENCOUNTER — OFFICE VISIT (OUTPATIENT)
Dept: HEMATOLOGY/ONCOLOGY | Facility: CLINIC | Age: 44
End: 2024-07-02
Payer: COMMERCIAL

## 2024-07-02 VITALS
HEIGHT: 64 IN | DIASTOLIC BLOOD PRESSURE: 78 MMHG | OXYGEN SATURATION: 96 % | WEIGHT: 211.81 LBS | HEART RATE: 71 BPM | SYSTOLIC BLOOD PRESSURE: 125 MMHG | RESPIRATION RATE: 18 BRPM | TEMPERATURE: 98 F | BODY MASS INDEX: 36.16 KG/M2

## 2024-07-02 DIAGNOSIS — Z79.811 LONG TERM (CURRENT) USE OF AROMATASE INHIBITORS: ICD-10-CM

## 2024-07-02 DIAGNOSIS — Z17.0 MALIGNANT NEOPLASM OF LOWER-INNER QUADRANT OF LEFT BREAST IN FEMALE, ESTROGEN RECEPTOR POSITIVE: ICD-10-CM

## 2024-07-02 DIAGNOSIS — M85.80 OSTEOPENIA, UNSPECIFIED LOCATION: Primary | ICD-10-CM

## 2024-07-02 DIAGNOSIS — C50.312 MALIGNANT NEOPLASM OF LOWER-INNER QUADRANT OF LEFT BREAST IN FEMALE, ESTROGEN RECEPTOR POSITIVE: ICD-10-CM

## 2024-07-02 LAB
ALBUMIN SERPL-MCNC: 3.8 G/DL (ref 3.5–5)
ALBUMIN/GLOB SERPL: 1.3 RATIO (ref 1.1–2)
ALP SERPL-CCNC: 102 UNIT/L (ref 40–150)
ALT SERPL-CCNC: 14 UNIT/L (ref 0–55)
ANION GAP SERPL CALC-SCNC: 11 MEQ/L
AST SERPL-CCNC: 15 UNIT/L (ref 5–34)
B-HCG UR QL: NEGATIVE
BASOPHILS # BLD AUTO: 0.03 X10(3)/MCL
BASOPHILS NFR BLD AUTO: 0.4 %
BILIRUB SERPL-MCNC: 0.3 MG/DL
BUN SERPL-MCNC: 8.8 MG/DL (ref 7–18.7)
CALCIUM SERPL-MCNC: 9.7 MG/DL (ref 8.4–10.2)
CHLORIDE SERPL-SCNC: 100 MMOL/L (ref 98–107)
CO2 SERPL-SCNC: 29 MMOL/L (ref 22–29)
CREAT SERPL-MCNC: 0.91 MG/DL (ref 0.55–1.02)
CREAT/UREA NIT SERPL: 10
EOSINOPHIL # BLD AUTO: 0.18 X10(3)/MCL (ref 0–0.9)
EOSINOPHIL NFR BLD AUTO: 2.4 %
ERYTHROCYTE [DISTWIDTH] IN BLOOD BY AUTOMATED COUNT: 12.6 % (ref 11.5–17)
GFR SERPLBLD CREATININE-BSD FMLA CKD-EPI: >60 ML/MIN/1.73/M2
GLOBULIN SER-MCNC: 3 GM/DL (ref 2.4–3.5)
GLUCOSE SERPL-MCNC: 100 MG/DL (ref 74–100)
HCT VFR BLD AUTO: 43 % (ref 37–47)
HGB BLD-MCNC: 13.9 G/DL (ref 12–16)
IMM GRANULOCYTES # BLD AUTO: 0.02 X10(3)/MCL (ref 0–0.04)
IMM GRANULOCYTES NFR BLD AUTO: 0.3 %
LYMPHOCYTES # BLD AUTO: 1.53 X10(3)/MCL (ref 0.6–4.6)
LYMPHOCYTES NFR BLD AUTO: 20.1 %
MCH RBC QN AUTO: 31 PG (ref 27–31)
MCHC RBC AUTO-ENTMCNC: 32.3 G/DL (ref 33–36)
MCV RBC AUTO: 96 FL (ref 80–94)
MONOCYTES # BLD AUTO: 0.53 X10(3)/MCL (ref 0.1–1.3)
MONOCYTES NFR BLD AUTO: 7 %
NEUTROPHILS # BLD AUTO: 5.32 X10(3)/MCL (ref 2.1–9.2)
NEUTROPHILS NFR BLD AUTO: 69.8 %
PLATELET # BLD AUTO: 315 X10(3)/MCL (ref 130–400)
PMV BLD AUTO: 9.6 FL (ref 7.4–10.4)
POTASSIUM SERPL-SCNC: 4.5 MMOL/L (ref 3.5–5.1)
PROT SERPL-MCNC: 6.8 GM/DL (ref 6.4–8.3)
RBC # BLD AUTO: 4.48 X10(6)/MCL (ref 4.2–5.4)
SODIUM SERPL-SCNC: 140 MMOL/L (ref 136–145)
WBC # BLD AUTO: 7.61 X10(3)/MCL (ref 4.5–11.5)

## 2024-07-02 PROCEDURE — 36415 COLL VENOUS BLD VENIPUNCTURE: CPT

## 2024-07-02 PROCEDURE — 85025 COMPLETE CBC W/AUTO DIFF WBC: CPT

## 2024-07-02 PROCEDURE — 3078F DIAST BP <80 MM HG: CPT | Mod: CPTII,S$GLB,,

## 2024-07-02 PROCEDURE — 1160F RVW MEDS BY RX/DR IN RCRD: CPT | Mod: CPTII,S$GLB,,

## 2024-07-02 PROCEDURE — 1159F MED LIST DOCD IN RCRD: CPT | Mod: CPTII,S$GLB,,

## 2024-07-02 PROCEDURE — 3008F BODY MASS INDEX DOCD: CPT | Mod: CPTII,S$GLB,,

## 2024-07-02 PROCEDURE — 3074F SYST BP LT 130 MM HG: CPT | Mod: CPTII,S$GLB,,

## 2024-07-02 PROCEDURE — 80053 COMPREHEN METABOLIC PANEL: CPT

## 2024-07-02 PROCEDURE — 99999 PR PBB SHADOW E&M-EST. PATIENT-LVL III: CPT | Mod: PBBFAC,,,

## 2024-07-02 PROCEDURE — 99215 OFFICE O/P EST HI 40 MIN: CPT | Mod: S$GLB,,,

## 2024-07-02 PROCEDURE — 81025 URINE PREGNANCY TEST: CPT

## 2024-07-02 NOTE — PROGRESS NOTES
Subjective:       Patient ID: Brittnee Wakefield is a 44 y.o. female.    Chief Complaint: Follow up    Diagnosis:  Left Breast Cancer - ER+, SD+, HER 2-    Current Treatment:   Zoladex + Letrozole -  5/8/24- present  Plan for Prolia q6 months dental clearance    Treatment History:   2/19/2024 Left Lumpectomy with Left [place] Lymph Node Biopsy - Dr. Dumont  Adjuvant XRT - 4/1/24 - 4/19/24    HPI:   43 yo F presents in March '24 for evaluation of Hormone positive Left Breast Cancer. She began to feel more tired and run down in the fall '23, therefore PCP recommended she undergo her age appropriate cancer screenings prompting her for screening MMG. At that time she was noted in the left breast to have 2 abnormal masses detected at the 8-9 o'clock and 11-12 o'clock positions. Follow up imaging revealed numerous enhancing masses throughout the left breast on DG MMG/US. She underwent breast MRI in January '24 which revealed 3 masses, all around 1cm in her left breast at different locations. She underwent biopsy and ultimately 1 of the 3 returned malignant, the other 2 were biopsy proven benign fibroadenoma changes. She ultimately had a 1.3 x 0.9 x 0.7cm irregular enhancing mass with spiculated margins at the 8 o'clock position 5 CMFN. Biopsy revealed invasive ductal carcinoma, G1, ER 72%, SD 84%, HER 2 1+, Ki67 10%. She underwent Left Breast Lumpectomy with SLNBx with Dr. Greenberg on 2/19/24. Final path consistent with 9mm of IDC measuring 9mm. 0/3 lymph nodes involved with metastatic disease. Surgical oncology had an oncotype performed which had a score of 17. She is pre menopausal at the time of diagnosis.     Her diagnosis, staging, and prognosis were discussed. The NCCN guidelines when discussing her therapy moving forward were referenced. We reviewed her oncotype in great detail given she is a T1b grade 1 lesion her tumor was not studied in the TailoRx trial, however with an oncotype of 17 there is a 1.6% benefit to  chemotherapy in women <51yo, but it is not clear if this benefit was from chemotherapy itself or the resulting ovarian suppression. She expressed understanding. She ultimately decided to forego chemotherapy.     Interval History:  Patient presents to clinic today for NP follow up appointment and labs to discuss results and plan of treatment.   She is doing well today.  Tolerating Letrozole without issue.  Today she have no complaints  She expressed a desire to have a hysterectomy to  avoid monthly Zoladex injections  Discussed in detail it would be required to have a oophorectomy to replace injections and why.  And that the risk may out weigh the benefit. She verbalized understanding and is agreeable to continue monthly injections.    She denies any hot flashes, arthralgias, break through bleeding or new breast pains or palpable masses.  She was due to start Prolia; however she is scheduled for dental work later this month; therefore will delay start to October to allow time to heal.   Appetite and energy levels have been stable.  She has no complaints or concerns today.        Past Medical History:   Diagnosis Date    Anxiety     Cancer     breast left    History of seizures     At the age of 1yo      Past Surgical History:   Procedure Laterality Date     SECTION      MASTECTOMY, PARTIAL Left 2024    Procedure: MASTECTOMY, PARTIAL - LEFT Need Sentimag Need Faxitron Need sterile charms and sterile ink;  Surgeon: Teri Greenberg MD;  Location: [facility];  Service: General;  Laterality: Left;  Need Sentimag  Need Faxitron  Need sterile charms and sterile ink    SENTINEL LYMPH NODE BIOPSY Left 2024    Procedure: BIOPSY, LYMPH NODE, SENTINEL - LEFT  need nuc med need marco antonio node;  Surgeon: Teri Greenberg MD;  Location: [facility] OR;  Service: General;  Laterality: Left;  need nuc med  need marco antonio node     Social History     Socioeconomic History    Marital status:    Tobacco Use    Smoking status:  Former     Types: Cigarettes    Smokeless tobacco: Never   Substance and Sexual Activity    Alcohol use: Not Currently    Drug use: Never    Sexual activity: Yes     Partners: Male     Birth control/protection: Condom      Family History   Problem Relation Name Age of Onset    Liver cancer Father      Hepatitis Maternal Grandmother      Cancer Maternal Grandmother      Breast cancer Maternal Aunt Claudia Ho 69    Cancer Maternal Aunt Claudia Ho     Brain cancer Maternal Cousin  28      Review of patient's allergies indicates:   Allergen Reactions    Adhesive      Skin tear      Review of Systems   Constitutional:  Negative for activity change, fever and unexpected weight change.   HENT:  Negative for sore throat.    Eyes:  Negative for visual disturbance.   Respiratory:  Negative for cough and shortness of breath.    Cardiovascular:  Negative for chest pain.   Gastrointestinal:  Negative for abdominal pain, diarrhea, nausea and vomiting.   Endocrine: Negative for polyuria.   Genitourinary:  Negative for dysuria and hot flashes.   Integumentary:  Negative for rash.   Neurological:  Negative for weakness and headaches.   Hematological:  Negative for adenopathy.   Psychiatric/Behavioral:  Negative for confusion.          Objective:      Vitals:    07/02/24 1323   BP: 125/78   Pulse: 71   Resp: 18   Temp: 98.2 °F (36.8 °C)     Physical Exam  Constitutional:       General: She is not in acute distress.     Appearance: Normal appearance. She is not ill-appearing.   HENT:      Head: Normocephalic and atraumatic.      Nose: Nose normal.      Mouth/Throat:      Mouth: Mucous membranes are moist.      Pharynx: Oropharynx is clear.   Eyes:      Extraocular Movements: Extraocular movements intact.      Conjunctiva/sclera: Conjunctivae normal.      Pupils: Pupils are equal, round, and reactive to light.   Cardiovascular:      Rate and Rhythm: Normal rate and regular rhythm.      Pulses: Normal pulses.      Heart sounds: Normal  heart sounds. No murmur heard.  Pulmonary:      Effort: Pulmonary effort is normal. No respiratory distress.      Breath sounds: Normal breath sounds.   Abdominal:      General: There is no distension.      Palpations: Abdomen is soft.      Tenderness: There is no abdominal tenderness.   Musculoskeletal:         General: Normal range of motion.      Cervical back: Normal range of motion and neck supple.      Right lower leg: No edema.      Left lower leg: No edema.   Lymphadenopathy:      Cervical: No cervical adenopathy.   Skin:     General: Skin is warm and dry.   Neurological:      General: No focal deficit present.      Mental Status: She is alert and oriented to person, place, and time.         LABS AND IMAGING REVIEWED IN EPIC     Imagin2023 BL SC MG at [facility]- which revealed in L breast at 8 o'clock -9 o'clock middle depth, there is an irregular mass with spiculated margins and associated architectural distortion.  At 11 o'clock- 12 o'clock L breast anterior depth, there is an oval mass seen.   Additionally, there is an oval mass in the L breast upper outer quadrant middle depth.   No significant masses, calcifications, or other findings are seen in the R breast. BIRADS-0; additional imaging needed     2. 2023 L DG MG/ L US BREAST COMPLETE at [facility]- which revealed on MG:       a. At 8 o'clock middle depth, there is a persistent 0.8 cm irregular equal density mass with spiculated margins and associated architectural distortion (spanning 4.5 cm)        b. At 11 o'clock anterior depth, there is a persistent 1.0 cm oval high density mass with circumscribed margins,         c. At 1 o'clock middle depth, , there is a 0.9 cm oval equal density mass with circumscribed margins      On L US:         a. At 8 o'clock 5 cm FN,  there is a 0.8 cm x 0.7 cm x 0.7 cm irregular avascular hypoechoic mass with spiculated margins, posterior shadowing, and associated architectural distortion           b  At 11  o'clock 3 cm FN, there is a 1.0 cm x 0.6 cm x 0.8 cm oval avascular hypoechoic mass with angular margins and posterior shadowing           c. At 1 o'clock  3 cm FN, there is a 0.9 cm x 0.4 cm x 0.8 cm parallel oval avascular hypoechoic mass with circumscribed margins   BIRADS-5 highly suspicious;biopsy recommended.     3. 1/31/2024 BL BREAST MRI- which revealed       Left Breast-        A. At 8 o'clock 5 cm FN,  there is a 1.3 x 0.9 x 0.7 cm irregular, enhancing mass with spiculated margins (biopsy proven malignancy)        B. At 1 o'clock 3 cm FN, there is a 0.9 x 0.5 x 1.0 cm oval, enhancing mass with circumscribed margins (biopsy proven benign fibroadenomatoid change and sclerosing adenosis)         C. At 11 o'clock 3 cm FN, there is a 1.0 x 0.9 x 0.8 cm oval, enhancing mass with circumscribed margins.(biopsy proven fibroadenoma/fibroadenomatoid change)         Right Breast- .no suspicious areas of enhancement or areas of architectural distortion are seen.  There is no skin thickening or nipple retraction.  No axillary or internal mammary lymphadenopathy is identified.     BIRADS-6 Known biopsy proven malignancy    DEXA 5/15/2024  Impression:   Low bone mass.      Pathology:  1/16/2024 Ultrasound-guided Core Needle Biopsy Left Breast 8 o'clock 5 cm FN-        - Invasive ductal carcinoma, grade 1 (measuring 9.0 mm)        - Ductal carcinoma in situ, grade 2 without necrosis          ER 72%          [place] 84%          HER 2 ezio 1+          Ki67 10%     2. 1/16/2024 Ultrasound-guided Core Needle Biopsy Left Breast 11 o'clock 3 cm FN-        - Fibroadenoma/ fibroadenomatoid change    3. 1/16/2024 Ultrasound-guided Core Needle Biopsy Left Breast 1 o'clock 3 cm FN-        - Fibroadenomatoid change and sclerosing adenosis    4. 2/19/2024 Left Lumpectomy with Left [place] Lymph Node Biopsy which revealed invasive ductal carcinoma, grade 1 (measuring 0.9 cm); and lobular carcinoma in situ.  All margins clear.  Three  sentinel lymph nodes negative for metastaic carcinoma.       Assessment:   Stage 1A Left Hormone Positive Breast Cancer - T1bN0, G1. Oncotype 17. Discussed risks/benefits/toxicity/role for chemotherapy and she chose to not pursue adjuvant chemotherapy. She finished adjuvant XRT in April '24. Started on OS + AI in Feb '24 for planned 5-10 years.       Plan:     - Toxicity reviewed; okay to continue Letrozole  - Continue monthly  Zoladex; next due 7/5  - Continue Calcium and Vitamin D  - Prolia IM Q6 months; will delay start to October given patient is scheduled for dental work later this month. She was provided with another form for when her dentist clears her start. This delay will also allow for healing time prior to start  - Instructed to call the office for break through bleeding  - Monthly lab only appt  - RTC for NP visit in 3 months, labs same day           I spent a total of 40 minutes on the day of the visit.This includes face to face time and non-face to face time preparing to see the patient (eg, review of tests), obtaining and/or reviewing separately obtained history, documenting clinical information in the electronic or other health record, independently interpreting results and communicating results to the patient/family/caregiver, or care coordinator.          TATE Daniels-LOPEZ  Hematology/Oncology   Cancer Center [facility]

## 2024-07-05 ENCOUNTER — INFUSION (OUTPATIENT)
Dept: INFUSION THERAPY | Facility: HOSPITAL | Age: 44
End: 2024-07-05
Attending: STUDENT IN AN ORGANIZED HEALTH CARE EDUCATION/TRAINING PROGRAM
Payer: COMMERCIAL

## 2024-07-05 DIAGNOSIS — Z17.0 MALIGNANT NEOPLASM OF LOWER-INNER QUADRANT OF LEFT BREAST IN FEMALE, ESTROGEN RECEPTOR POSITIVE: Primary | ICD-10-CM

## 2024-07-05 DIAGNOSIS — C50.312 MALIGNANT NEOPLASM OF LOWER-INNER QUADRANT OF LEFT BREAST IN FEMALE, ESTROGEN RECEPTOR POSITIVE: Primary | ICD-10-CM

## 2024-07-05 PROCEDURE — 63600175 PHARM REV CODE 636 W HCPCS: Mod: JZ,JG

## 2024-07-05 PROCEDURE — 96402 CHEMO HORMON ANTINEOPL SQ/IM: CPT

## 2024-07-05 RX ADMIN — GOSERELIN ACETATE 3.6 MG: 3.6 IMPLANT SUBCUTANEOUS at 12:07

## 2024-08-05 ENCOUNTER — INFUSION (OUTPATIENT)
Dept: INFUSION THERAPY | Facility: HOSPITAL | Age: 44
End: 2024-08-05
Attending: STUDENT IN AN ORGANIZED HEALTH CARE EDUCATION/TRAINING PROGRAM
Payer: COMMERCIAL

## 2024-08-05 VITALS
WEIGHT: 210.63 LBS | HEIGHT: 64 IN | TEMPERATURE: 98 F | DIASTOLIC BLOOD PRESSURE: 75 MMHG | OXYGEN SATURATION: 97 % | HEART RATE: 61 BPM | SYSTOLIC BLOOD PRESSURE: 120 MMHG | BODY MASS INDEX: 35.96 KG/M2 | RESPIRATION RATE: 20 BRPM

## 2024-08-05 DIAGNOSIS — C50.312 MALIGNANT NEOPLASM OF LOWER-INNER QUADRANT OF LEFT BREAST IN FEMALE, ESTROGEN RECEPTOR POSITIVE: Primary | ICD-10-CM

## 2024-08-05 DIAGNOSIS — M85.80 OSTEOPENIA, UNSPECIFIED LOCATION: ICD-10-CM

## 2024-08-05 DIAGNOSIS — Z17.0 MALIGNANT NEOPLASM OF LOWER-INNER QUADRANT OF LEFT BREAST IN FEMALE, ESTROGEN RECEPTOR POSITIVE: Primary | ICD-10-CM

## 2024-08-05 LAB — B-HCG UR QL: NEGATIVE

## 2024-08-05 PROCEDURE — 63600175 PHARM REV CODE 636 W HCPCS: Mod: JZ,JG

## 2024-08-05 PROCEDURE — 81025 URINE PREGNANCY TEST: CPT

## 2024-08-05 PROCEDURE — 96402 CHEMO HORMON ANTINEOPL SQ/IM: CPT

## 2024-08-05 PROCEDURE — 96372 THER/PROPH/DIAG INJ SC/IM: CPT | Mod: 59

## 2024-08-05 PROCEDURE — 63600175 PHARM REV CODE 636 W HCPCS: Mod: JZ,JG | Performed by: STUDENT IN AN ORGANIZED HEALTH CARE EDUCATION/TRAINING PROGRAM

## 2024-08-05 RX ADMIN — GOSERELIN ACETATE 3.6 MG: 3.6 IMPLANT SUBCUTANEOUS at 11:08

## 2024-08-05 RX ADMIN — DENOSUMAB 60 MG: 60 INJECTION SUBCUTANEOUS at 11:08

## 2024-08-22 DIAGNOSIS — C50.312 MALIGNANT NEOPLASM OF LOWER-INNER QUADRANT OF LEFT BREAST IN FEMALE, ESTROGEN RECEPTOR POSITIVE: ICD-10-CM

## 2024-08-22 DIAGNOSIS — Z17.0 MALIGNANT NEOPLASM OF LOWER-INNER QUADRANT OF LEFT BREAST IN FEMALE, ESTROGEN RECEPTOR POSITIVE: ICD-10-CM

## 2024-08-22 RX ORDER — LETROZOLE 2.5 MG/1
2.5 TABLET, FILM COATED ORAL DAILY
Qty: 30 TABLET | Refills: 2 | Status: SHIPPED | OUTPATIENT
Start: 2024-08-22 | End: 2025-08-22

## 2024-09-05 ENCOUNTER — INFUSION (OUTPATIENT)
Dept: INFUSION THERAPY | Facility: HOSPITAL | Age: 44
End: 2024-09-05
Attending: STUDENT IN AN ORGANIZED HEALTH CARE EDUCATION/TRAINING PROGRAM
Payer: COMMERCIAL

## 2024-09-05 VITALS
DIASTOLIC BLOOD PRESSURE: 83 MMHG | HEIGHT: 64 IN | RESPIRATION RATE: 18 BRPM | TEMPERATURE: 98 F | WEIGHT: 211.63 LBS | HEART RATE: 78 BPM | BODY MASS INDEX: 36.13 KG/M2 | SYSTOLIC BLOOD PRESSURE: 125 MMHG

## 2024-09-05 DIAGNOSIS — Z17.0 MALIGNANT NEOPLASM OF LOWER-INNER QUADRANT OF LEFT BREAST IN FEMALE, ESTROGEN RECEPTOR POSITIVE: Primary | ICD-10-CM

## 2024-09-05 DIAGNOSIS — C50.312 MALIGNANT NEOPLASM OF LOWER-INNER QUADRANT OF LEFT BREAST IN FEMALE, ESTROGEN RECEPTOR POSITIVE: Primary | ICD-10-CM

## 2024-09-05 PROCEDURE — 63600175 PHARM REV CODE 636 W HCPCS: Mod: JZ,JG | Performed by: STUDENT IN AN ORGANIZED HEALTH CARE EDUCATION/TRAINING PROGRAM

## 2024-09-05 PROCEDURE — 96402 CHEMO HORMON ANTINEOPL SQ/IM: CPT

## 2024-09-05 RX ADMIN — GOSERELIN ACETATE 3.6 MG: 3.6 IMPLANT SUBCUTANEOUS at 12:09

## 2024-10-01 ENCOUNTER — HOSPITAL ENCOUNTER (OUTPATIENT)
Dept: RADIOLOGY | Facility: HOSPITAL | Age: 44
Discharge: HOME | End: 2024-10-01
Attending: SURGERY
Payer: COMMERCIAL

## 2024-10-01 DIAGNOSIS — Z85.3 PERSONAL HISTORY OF BREAST CANCER: ICD-10-CM

## 2024-10-01 PROCEDURE — 77066 DX MAMMO INCL CAD BI: CPT | Mod: TC

## 2024-10-01 PROCEDURE — 77066 DX MAMMO INCL CAD BI: CPT | Mod: 26,,, | Performed by: STUDENT IN AN ORGANIZED HEALTH CARE EDUCATION/TRAINING PROGRAM

## 2024-10-01 PROCEDURE — 77062 BREAST TOMOSYNTHESIS BI: CPT | Mod: TC

## 2024-10-01 PROCEDURE — 77062 BREAST TOMOSYNTHESIS BI: CPT | Mod: 26,,, | Performed by: STUDENT IN AN ORGANIZED HEALTH CARE EDUCATION/TRAINING PROGRAM

## 2024-10-07 ENCOUNTER — INFUSION (OUTPATIENT)
Dept: INFUSION THERAPY | Facility: HOSPITAL | Age: 44
End: 2024-10-07
Attending: STUDENT IN AN ORGANIZED HEALTH CARE EDUCATION/TRAINING PROGRAM
Payer: COMMERCIAL

## 2024-10-07 VITALS
OXYGEN SATURATION: 96 % | SYSTOLIC BLOOD PRESSURE: 116 MMHG | HEIGHT: 64 IN | DIASTOLIC BLOOD PRESSURE: 73 MMHG | TEMPERATURE: 98 F | WEIGHT: 211.63 LBS | HEART RATE: 64 BPM | BODY MASS INDEX: 36.13 KG/M2 | RESPIRATION RATE: 18 BRPM

## 2024-10-07 DIAGNOSIS — Z17.0 MALIGNANT NEOPLASM OF LOWER-INNER QUADRANT OF LEFT BREAST IN FEMALE, ESTROGEN RECEPTOR POSITIVE: Primary | ICD-10-CM

## 2024-10-07 DIAGNOSIS — C50.312 MALIGNANT NEOPLASM OF LOWER-INNER QUADRANT OF LEFT BREAST IN FEMALE, ESTROGEN RECEPTOR POSITIVE: Primary | ICD-10-CM

## 2024-10-07 PROCEDURE — 96401 CHEMO ANTI-NEOPL SQ/IM: CPT

## 2024-10-07 PROCEDURE — 63600175 PHARM REV CODE 636 W HCPCS: Mod: JZ,JG | Performed by: STUDENT IN AN ORGANIZED HEALTH CARE EDUCATION/TRAINING PROGRAM

## 2024-10-07 RX ADMIN — GOSERELIN ACETATE 3.6 MG: 3.6 IMPLANT SUBCUTANEOUS at 01:10

## 2024-10-07 NOTE — NURSING
Pt here for zoladex injection. Injection given. Pt brandon well. Will rtc in 4 wks for next inj    Pt does have f/up scheduled thurs this week. Pt unsure if she can make it. Encouraged her to make sure she can if not to please call or message the care team and reschedule. Pt verb understanding

## 2024-11-04 ENCOUNTER — INFUSION (OUTPATIENT)
Dept: INFUSION THERAPY | Facility: HOSPITAL | Age: 44
End: 2024-11-04
Attending: STUDENT IN AN ORGANIZED HEALTH CARE EDUCATION/TRAINING PROGRAM
Payer: COMMERCIAL

## 2024-11-04 DIAGNOSIS — Z17.0 MALIGNANT NEOPLASM OF LOWER-INNER QUADRANT OF LEFT BREAST IN FEMALE, ESTROGEN RECEPTOR POSITIVE: Primary | ICD-10-CM

## 2024-11-04 DIAGNOSIS — C50.312 MALIGNANT NEOPLASM OF LOWER-INNER QUADRANT OF LEFT BREAST IN FEMALE, ESTROGEN RECEPTOR POSITIVE: Primary | ICD-10-CM

## 2024-11-04 PROCEDURE — 96401 CHEMO ANTI-NEOPL SQ/IM: CPT

## 2024-11-04 PROCEDURE — 63600175 PHARM REV CODE 636 W HCPCS: Mod: JZ,JG | Performed by: STUDENT IN AN ORGANIZED HEALTH CARE EDUCATION/TRAINING PROGRAM

## 2024-11-04 RX ADMIN — GOSERELIN ACETATE 3.6 MG: 3.6 IMPLANT SUBCUTANEOUS at 11:11

## 2024-12-02 ENCOUNTER — OFFICE VISIT (OUTPATIENT)
Dept: HEMATOLOGY/ONCOLOGY | Facility: CLINIC | Age: 44
End: 2024-12-02
Payer: COMMERCIAL

## 2024-12-02 ENCOUNTER — LAB VISIT (OUTPATIENT)
Dept: LAB | Facility: HOSPITAL | Age: 44
End: 2024-12-02
Attending: STUDENT IN AN ORGANIZED HEALTH CARE EDUCATION/TRAINING PROGRAM
Payer: COMMERCIAL

## 2024-12-02 ENCOUNTER — INFUSION (OUTPATIENT)
Dept: INFUSION THERAPY | Facility: HOSPITAL | Age: 44
End: 2024-12-02
Attending: STUDENT IN AN ORGANIZED HEALTH CARE EDUCATION/TRAINING PROGRAM
Payer: COMMERCIAL

## 2024-12-02 VITALS
BODY MASS INDEX: 35.03 KG/M2 | OXYGEN SATURATION: 98 % | HEIGHT: 64 IN | DIASTOLIC BLOOD PRESSURE: 75 MMHG | HEIGHT: 64 IN | HEART RATE: 70 BPM | TEMPERATURE: 98 F | OXYGEN SATURATION: 98 % | HEART RATE: 70 BPM | BODY MASS INDEX: 35.03 KG/M2 | RESPIRATION RATE: 18 BRPM | DIASTOLIC BLOOD PRESSURE: 75 MMHG | SYSTOLIC BLOOD PRESSURE: 110 MMHG | WEIGHT: 205.19 LBS | WEIGHT: 205.19 LBS | RESPIRATION RATE: 18 BRPM | TEMPERATURE: 98 F | SYSTOLIC BLOOD PRESSURE: 110 MMHG

## 2024-12-02 DIAGNOSIS — Z17.0 MALIGNANT NEOPLASM OF LOWER-INNER QUADRANT OF LEFT BREAST IN FEMALE, ESTROGEN RECEPTOR POSITIVE: ICD-10-CM

## 2024-12-02 DIAGNOSIS — Z79.811 LONG TERM (CURRENT) USE OF AROMATASE INHIBITORS: ICD-10-CM

## 2024-12-02 DIAGNOSIS — C50.312 MALIGNANT NEOPLASM OF LOWER-INNER QUADRANT OF LEFT BREAST IN FEMALE, ESTROGEN RECEPTOR POSITIVE: Primary | ICD-10-CM

## 2024-12-02 DIAGNOSIS — C50.312 MALIGNANT NEOPLASM OF LOWER-INNER QUADRANT OF LEFT BREAST IN FEMALE, ESTROGEN RECEPTOR POSITIVE: ICD-10-CM

## 2024-12-02 DIAGNOSIS — Z17.0 MALIGNANT NEOPLASM OF LOWER-INNER QUADRANT OF LEFT BREAST IN FEMALE, ESTROGEN RECEPTOR POSITIVE: Primary | ICD-10-CM

## 2024-12-02 LAB
ALBUMIN SERPL-MCNC: 3.8 G/DL (ref 3.5–5)
ALBUMIN/GLOB SERPL: 1.1 RATIO (ref 1.1–2)
ALP SERPL-CCNC: 75 UNIT/L (ref 40–150)
ALT SERPL-CCNC: 16 UNIT/L (ref 0–55)
ANION GAP SERPL CALC-SCNC: 8 MEQ/L
AST SERPL-CCNC: 16 UNIT/L (ref 5–34)
B-HCG UR QL: NEGATIVE
BASOPHILS # BLD AUTO: 0.03 X10(3)/MCL
BASOPHILS NFR BLD AUTO: 0.5 %
BILIRUB SERPL-MCNC: 0.2 MG/DL
BUN SERPL-MCNC: 10.3 MG/DL (ref 7–18.7)
CALCIUM SERPL-MCNC: 9.9 MG/DL (ref 8.4–10.2)
CHLORIDE SERPL-SCNC: 105 MMOL/L (ref 98–107)
CO2 SERPL-SCNC: 28 MMOL/L (ref 22–29)
CREAT SERPL-MCNC: 0.85 MG/DL (ref 0.55–1.02)
CREAT/UREA NIT SERPL: 12
EOSINOPHIL # BLD AUTO: 0.13 X10(3)/MCL (ref 0–0.9)
EOSINOPHIL NFR BLD AUTO: 2 %
ERYTHROCYTE [DISTWIDTH] IN BLOOD BY AUTOMATED COUNT: 12.4 % (ref 11.5–17)
GFR SERPLBLD CREATININE-BSD FMLA CKD-EPI: >60 ML/MIN/1.73/M2
GLOBULIN SER-MCNC: 3.5 GM/DL (ref 2.4–3.5)
GLUCOSE SERPL-MCNC: 100 MG/DL (ref 74–100)
HCT VFR BLD AUTO: 44.2 % (ref 37–47)
HGB BLD-MCNC: 14.5 G/DL (ref 12–16)
IMM GRANULOCYTES # BLD AUTO: 0.02 X10(3)/MCL (ref 0–0.04)
IMM GRANULOCYTES NFR BLD AUTO: 0.3 %
LYMPHOCYTES # BLD AUTO: 1.54 X10(3)/MCL (ref 0.6–4.6)
LYMPHOCYTES NFR BLD AUTO: 23.3 %
MCH RBC QN AUTO: 30.8 PG (ref 27–31)
MCHC RBC AUTO-ENTMCNC: 32.8 G/DL (ref 33–36)
MCV RBC AUTO: 93.8 FL (ref 80–94)
MONOCYTES # BLD AUTO: 0.45 X10(3)/MCL (ref 0.1–1.3)
MONOCYTES NFR BLD AUTO: 6.8 %
NEUTROPHILS # BLD AUTO: 4.43 X10(3)/MCL (ref 2.1–9.2)
NEUTROPHILS NFR BLD AUTO: 67.1 %
PLATELET # BLD AUTO: 321 X10(3)/MCL (ref 130–400)
PMV BLD AUTO: 9.7 FL (ref 7.4–10.4)
POTASSIUM SERPL-SCNC: 4.1 MMOL/L (ref 3.5–5.1)
PROT SERPL-MCNC: 7.3 GM/DL (ref 6.4–8.3)
RBC # BLD AUTO: 4.71 X10(6)/MCL (ref 4.2–5.4)
SODIUM SERPL-SCNC: 141 MMOL/L (ref 136–145)
WBC # BLD AUTO: 6.6 X10(3)/MCL (ref 4.5–11.5)

## 2024-12-02 PROCEDURE — 96401 CHEMO ANTI-NEOPL SQ/IM: CPT

## 2024-12-02 PROCEDURE — 80053 COMPREHEN METABOLIC PANEL: CPT

## 2024-12-02 PROCEDURE — 1160F RVW MEDS BY RX/DR IN RCRD: CPT | Mod: CPTII,S$GLB,, | Performed by: STUDENT IN AN ORGANIZED HEALTH CARE EDUCATION/TRAINING PROGRAM

## 2024-12-02 PROCEDURE — 3008F BODY MASS INDEX DOCD: CPT | Mod: CPTII,S$GLB,, | Performed by: STUDENT IN AN ORGANIZED HEALTH CARE EDUCATION/TRAINING PROGRAM

## 2024-12-02 PROCEDURE — 81025 URINE PREGNANCY TEST: CPT

## 2024-12-02 PROCEDURE — 99215 OFFICE O/P EST HI 40 MIN: CPT | Mod: S$GLB,,, | Performed by: STUDENT IN AN ORGANIZED HEALTH CARE EDUCATION/TRAINING PROGRAM

## 2024-12-02 PROCEDURE — 1159F MED LIST DOCD IN RCRD: CPT | Mod: CPTII,S$GLB,, | Performed by: STUDENT IN AN ORGANIZED HEALTH CARE EDUCATION/TRAINING PROGRAM

## 2024-12-02 PROCEDURE — 85025 COMPLETE CBC W/AUTO DIFF WBC: CPT

## 2024-12-02 PROCEDURE — 99999 PR PBB SHADOW E&M-EST. PATIENT-LVL III: CPT | Mod: PBBFAC,,, | Performed by: STUDENT IN AN ORGANIZED HEALTH CARE EDUCATION/TRAINING PROGRAM

## 2024-12-02 PROCEDURE — 36415 COLL VENOUS BLD VENIPUNCTURE: CPT

## 2024-12-02 PROCEDURE — 3078F DIAST BP <80 MM HG: CPT | Mod: CPTII,S$GLB,, | Performed by: STUDENT IN AN ORGANIZED HEALTH CARE EDUCATION/TRAINING PROGRAM

## 2024-12-02 PROCEDURE — 3074F SYST BP LT 130 MM HG: CPT | Mod: CPTII,S$GLB,, | Performed by: STUDENT IN AN ORGANIZED HEALTH CARE EDUCATION/TRAINING PROGRAM

## 2024-12-02 PROCEDURE — 63600175 PHARM REV CODE 636 W HCPCS: Mod: JZ,JG | Performed by: STUDENT IN AN ORGANIZED HEALTH CARE EDUCATION/TRAINING PROGRAM

## 2024-12-02 RX ADMIN — GOSERELIN ACETATE 3.6 MG: 3.6 IMPLANT SUBCUTANEOUS at 01:12

## 2024-12-02 NOTE — NURSING
Zoladex given, tolerated well. Discharged in stable condition and is aware of future appointments.

## 2024-12-02 NOTE — PROGRESS NOTES
Subjective:       Patient ID: Brittnee Wakefield is a 44 y.o. female.    Chief Complaint: Follow up    Diagnosis:  Left Breast Cancer - ER+, MS+, HER 2-    Current Treatment:   Zoladex + Letrozole -  5/8/24- present    Treatment History:   2/19/2024 Left Lumpectomy with Left [place] Lymph Node Biopsy - Dr. Greenberg  Adjuvant XRT - 4/1/24 - 4/19/24    HPI:   45 yo F presents in March '24 for evaluation of Hormone positive Left Breast Cancer. She began to feel more tired and run down in the fall '23, therefore PCP recommended she undergo her age appropriate cancer screenings prompting her for screening MMG. At that time she was noted in the left breast to have 2 abnormal masses detected at the 8-9 o'clock and 11-12 o'clock positions. Follow up imaging revealed numerous enhancing masses throughout the left breast on DG MMG/US. She underwent breast MRI in January '24 which revealed 3 masses, all around 1cm in her left breast at different locations. She underwent biopsy and ultimately 1 of the 3 returned malignant, the other 2 were biopsy proven benign fibroadenoma changes. She ultimately had a 1.3 x 0.9 x 0.7cm irregular enhancing mass with spiculated margins at the 8 o'clock position 5 CMFN. Biopsy revealed invasive ductal carcinoma, G1, ER 72%, MS 84%, HER 2 1+, Ki67 10%. She underwent Left Breast Lumpectomy with SLNBx with Dr. Greenberg on 2/19/24. Final path consistent with 9mm of IDC measuring 9mm. 0/3 lymph nodes involved with metastatic disease. Surgical oncology had an oncotype performed which had a score of 17. She is pre menopausal at the time of diagnosis.     Her diagnosis, staging, and prognosis were discussed. The NCCN guidelines when discussing her therapy moving forward were referenced. We reviewed her oncotype in great detail given she is a T1b grade 1 lesion her tumor was not studied in the TailoRx trial, however with an oncotype of 17 there is a 1.6% benefit to chemotherapy in women <51yo, but it is not clear  if this benefit was from chemotherapy itself or the resulting ovarian suppression. She expressed understanding. She ultimately decided to forego chemotherapy.     Interval History:  Patient presents to clinic today for MD follow up appointment and labs for toxicity check on Letrozole and Zoladex.  She states she is doing okay today.  Tolerating treatment with no major issue.  Complains of intermittent joint stiffness.  Denies any hot flashes.   Discussed labs in detail with patient.   Otherwise, she has no further complaints or concerns.      Past Medical History:   Diagnosis Date    Anxiety     Cancer     breast left    History of seizures     At the age of 3yo      Past Surgical History:   Procedure Laterality Date     SECTION      COLONOSCOPY  07/15/2024    Dr. CHICHO Waterman/Repeat colon in 10 years    MASTECTOMY, PARTIAL Left 2024    Procedure: MASTECTOMY, PARTIAL - LEFT Need Sentimag Need Faxitron Need sterile charms and sterile ink;  Surgeon: Teri Greenberg MD;  Location: [facility] OR;  Service: General;  Laterality: Left;  Need Sentimag  Need Faxitron  Need sterile charms and sterile ink    SENTINEL LYMPH NODE BIOPSY Left 2024    Procedure: BIOPSY, LYMPH NODE, SENTINEL - LEFT  need nuc med need marco antonio node;  Surgeon: Teri Greenberg MD;  Location: [facility] OR;  Service: General;  Laterality: Left;  need nuc med  need marco antonio node     Social History     Socioeconomic History    Marital status:    Tobacco Use    Smoking status: Former     Types: Cigarettes    Smokeless tobacco: Never   Substance and Sexual Activity    Alcohol use: Not Currently    Drug use: Never    Sexual activity: Yes     Partners: Male     Birth control/protection: Condom      Family History   Problem Relation Name Age of Onset    Liver cancer Father      Hepatitis Maternal Grandmother      Cancer Maternal Grandmother      Breast cancer Maternal Aunt Claudia Ho 69    Cancer Maternal Aunt Claudia Ho     Brain cancer Maternal  Cousin  28      Review of patient's allergies indicates:   Allergen Reactions    Adhesive      Skin tear      Review of Systems   Constitutional:  Negative for activity change, fever and unexpected weight change.   HENT:  Negative for sore throat.    Eyes:  Negative for visual disturbance.   Respiratory:  Negative for cough and shortness of breath.    Cardiovascular:  Negative for chest pain.   Gastrointestinal:  Negative for abdominal pain, diarrhea, nausea and vomiting.   Endocrine: Negative for polyuria.   Genitourinary:  Negative for dysuria and hot flashes.   Integumentary:  Negative for rash.   Neurological:  Negative for weakness and headaches.   Hematological:  Negative for adenopathy.   Psychiatric/Behavioral:  Negative for confusion.          Objective:      Vitals:    12/02/24 1203   BP: 110/75   Pulse: 70   Resp: 18   Temp: 98.3 °F (36.8 °C)       Physical Exam  Constitutional:       General: She is not in acute distress.     Appearance: Normal appearance. She is not ill-appearing.   HENT:      Head: Normocephalic and atraumatic.      Nose: Nose normal.      Mouth/Throat:      Mouth: Mucous membranes are moist.      Pharynx: Oropharynx is clear.   Eyes:      Extraocular Movements: Extraocular movements intact.      Conjunctiva/sclera: Conjunctivae normal.      Pupils: Pupils are equal, round, and reactive to light.   Cardiovascular:      Rate and Rhythm: Normal rate and regular rhythm.      Pulses: Normal pulses.      Heart sounds: Normal heart sounds. No murmur heard.  Pulmonary:      Effort: Pulmonary effort is normal. No respiratory distress.      Breath sounds: Normal breath sounds.   Abdominal:      General: There is no distension.      Palpations: Abdomen is soft.      Tenderness: There is no abdominal tenderness.   Musculoskeletal:         General: Normal range of motion.      Cervical back: Normal range of motion and neck supple.      Right lower leg: No edema.      Left lower leg: No edema.    Lymphadenopathy:      Cervical: No cervical adenopathy.   Skin:     General: Skin is warm and dry.   Neurological:      General: No focal deficit present.      Mental Status: She is alert and oriented to person, place, and time.         LABS AND IMAGING REVIEWED IN EPIC     Imagin2023 BL SC MG at [facility] - which revealed in L breast at 8 o'clock -9 o'clock middle depth, there is an irregular mass with spiculated margins and associated architectural distortion.  At 11 o'clock- 12 o'clock L breast anterior depth, there is an oval mass seen.   Additionally, there is an oval mass in the L breast upper outer quadrant middle depth.   No significant masses, calcifications, or other findings are seen in the R breast. BIRADS-0; additional imaging needed     2. 2023 L DG MG/ L US BREAST COMPLETE at [facility]- which revealed on MG:       a. At 8 o'clock middle depth, there is a persistent 0.8 cm irregular equal density mass with spiculated margins and associated architectural distortion (spanning 4.5 cm)        b. At 11 o'clock anterior depth, there is a persistent 1.0 cm oval high density mass with circumscribed margins,         c. At 1 o'clock middle depth, , there is a 0.9 cm oval equal density mass with circumscribed margins      On L US:         a. At 8 o'clock 5 cm FN,  there is a 0.8 cm x 0.7 cm x 0.7 cm irregular avascular hypoechoic mass with spiculated margins, posterior shadowing, and associated architectural distortion           b  At 11 o'clock 3 cm FN, there is a 1.0 cm x 0.6 cm x 0.8 cm oval avascular hypoechoic mass with angular margins and posterior shadowing           c. At 1 o'clock  3 cm FN, there is a 0.9 cm x 0.4 cm x 0.8 cm parallel oval avascular hypoechoic mass with circumscribed margins   BIRADS-5 highly suspicious;biopsy recommended.     3. 2024 BL BREAST MRI- which revealed       Left Breast-        A. At 8 o'clock 5 cm FN,  there is a 1.3 x 0.9 x 0.7 cm irregular, enhancing mass  with spiculated margins (biopsy proven malignancy)        B. At 1 o'clock 3 cm FN, there is a 0.9 x 0.5 x 1.0 cm oval, enhancing mass with circumscribed margins (biopsy proven benign fibroadenomatoid change and sclerosing adenosis)         C. At 11 o'clock 3 cm FN, there is a 1.0 x 0.9 x 0.8 cm oval, enhancing mass with circumscribed margins.(biopsy proven fibroadenoma/fibroadenomatoid change)         Right Breast- .no suspicious areas of enhancement or areas of architectural distortion are seen.  There is no skin thickening or nipple retraction.  No axillary or internal mammary lymphadenopathy is identified.     BIRADS-6 Known biopsy proven malignancy    4. 10/1/2024 BL DG MMG:   IMPRESSION: BENIGN  1) New, expected post-therapy change of the left breast and left axilla is benign.  BI-RADS 2: Benign.    2) No mammographic evidence of malignancy in either breast.       DEXA 5/15/2024  Impression:   Low bone mass.      Pathology:  1/16/2024 Ultrasound-guided Core Needle Biopsy Left Breast 8 o'clock 5 cm FN-        - Invasive ductal carcinoma, grade 1 (measuring 9.0 mm)        - Ductal carcinoma in situ, grade 2 without necrosis          ER 72%          [place] 84%          HER 2 ezio 1+          Ki67 10%     2. 1/16/2024 Ultrasound-guided Core Needle Biopsy Left Breast 11 o'clock 3 cm FN-        - Fibroadenoma/ fibroadenomatoid change    3. 1/16/2024 Ultrasound-guided Core Needle Biopsy Left Breast 1 o'clock 3 cm FN-        - Fibroadenomatoid change and sclerosing adenosis    4. 2/19/2024 Left Lumpectomy with Left [place] Lymph Node Biopsy which revealed invasive ductal carcinoma, grade 1 (measuring 0.9 cm); and lobular carcinoma in situ.  All margins clear.  Three sentinel lymph nodes negative for metastaic carcinoma.       Assessment:   Stage 1A Left Hormone Positive Breast Cancer - T1bN0, G1. Oncotype 17. Discussed risks/benefits/toxicity/role for chemotherapy and she chose to not pursue adjuvant chemotherapy. She  finished adjuvant XRT in April '24. Started on OS + AI in Feb '24 for planned 5-10 years.     Dexa due: July '26   Plan:   - Toxicity reviewed; okay to continue Letrozole  - Continue monthly Zoladex and lab only visit; due today  - Continue Calcium and Vitamin D  - Instructed to call the office for break through bleeding  - Continue Prolia as scheduled  - RTC 3 months for NP visit, labs same day       I spent a total of 40 minutes on the day of the visit.This includes face to face time and non-face to face time preparing to see the patient (eg, review of tests), obtaining and/or reviewing separately obtained history, documenting clinical information in the electronic or other health record, independently interpreting results and communicating results to the patient/family/caregiver, or care coordinator.      Elizabeth Lejeune, MD  Hematology/Oncology   Cancer Center [facility]      Professional Services   I, Marta Dupont LPN, acted solely as a scribe for and in the presence of Dr. Elizabeth Kennedy LeJeune, who performed these services.

## 2024-12-04 ENCOUNTER — OFFICE VISIT (OUTPATIENT)
Dept: SURGERY | Facility: CLINIC | Age: 44
End: 2024-12-04
Payer: COMMERCIAL

## 2024-12-04 VITALS
HEART RATE: 73 BPM | BODY MASS INDEX: 35.34 KG/M2 | OXYGEN SATURATION: 96 % | RESPIRATION RATE: 18 BRPM | DIASTOLIC BLOOD PRESSURE: 86 MMHG | SYSTOLIC BLOOD PRESSURE: 123 MMHG | WEIGHT: 207 LBS | HEIGHT: 64 IN | TEMPERATURE: 98 F

## 2024-12-04 DIAGNOSIS — Z17.0 MALIGNANT NEOPLASM OF LOWER-INNER QUADRANT OF LEFT BREAST IN FEMALE, ESTROGEN RECEPTOR POSITIVE: Primary | ICD-10-CM

## 2024-12-04 DIAGNOSIS — C50.312 MALIGNANT NEOPLASM OF LOWER-INNER QUADRANT OF LEFT BREAST IN FEMALE, ESTROGEN RECEPTOR POSITIVE: Primary | ICD-10-CM

## 2024-12-04 PROCEDURE — 99999 PR PBB SHADOW E&M-EST. PATIENT-LVL IV: CPT | Mod: PBBFAC,,, | Performed by: PHYSICIAN ASSISTANT

## 2024-12-04 PROCEDURE — 1159F MED LIST DOCD IN RCRD: CPT | Mod: CPTII,S$GLB,, | Performed by: PHYSICIAN ASSISTANT

## 2024-12-04 PROCEDURE — 3079F DIAST BP 80-89 MM HG: CPT | Mod: CPTII,S$GLB,, | Performed by: PHYSICIAN ASSISTANT

## 2024-12-04 PROCEDURE — 3008F BODY MASS INDEX DOCD: CPT | Mod: CPTII,S$GLB,, | Performed by: PHYSICIAN ASSISTANT

## 2024-12-04 PROCEDURE — 1160F RVW MEDS BY RX/DR IN RCRD: CPT | Mod: CPTII,S$GLB,, | Performed by: PHYSICIAN ASSISTANT

## 2024-12-04 PROCEDURE — 99214 OFFICE O/P EST MOD 30 MIN: CPT | Mod: S$GLB,,, | Performed by: PHYSICIAN ASSISTANT

## 2024-12-04 PROCEDURE — 3074F SYST BP LT 130 MM HG: CPT | Mod: CPTII,S$GLB,, | Performed by: PHYSICIAN ASSISTANT

## 2024-12-04 NOTE — PROGRESS NOTES
[facility] Breast Surg  Breast Surgical Oncology  Follow-Up Patient Office Visit       Referring Provider: No ref. provider found  PCP: Keyla Snowden MD   Medical Oncologist: No care team member to display   Radiation Oncologist: Billie Butts MD   Other Care Providers:     Chief Complaint:   Chief Complaint   Patient presents with    Follow-up     Patient has no breast related concerns today        Subjective:   Treatment History:  2024 Left Lumpectomy with Left SLNB   2.   24 - 24 Adjuvant radiation to left breast  3.   24 Zoladex + Letrozole started     Interval History:  2024 - Brittnee Wakefield presents today for her 6 month follow up. She is doing well since surgery. She has no breast concerns. BL DG MG in October is benign.    HPI:  Brittnee Wakefield is a 44 y.o. female who presents on 2024 for evaluation of newly diagnosed left  breast cancer.     A detailed patient history was obtained and reviewed. She currently denies any breast issues including rashes, redness, pain, swelling, nipple discharge, or new lumps/masses.     MG breast density: Category C (heterogeneously dense)      Imagin2023 BL SC MG at [facility]- which revealed in L breast at 8 o'clock -9 o'clock middle depth, there is an irregular mass with spiculated margins and associated architectural distortion.  At 11 o'clock- 12 o'clock L breast anterior depth, there is an oval mass seen.   Additionally, there is an oval mass in the L breast upper outer quadrant middle depth.   No significant masses, calcifications, or other findings are seen in the R breast. BIRADS-0; additional imaging needed     2. 2023 L DG MG/ L US BREAST COMPLETE at [facility]- which revealed on MG:       a. At 8 o'clock middle depth, there is a persistent 0.8 cm irregular equal density mass with spiculated margins and associated architectural distortion (spanning 4.5 cm)        b. At 11 o'clock anterior depth,  there is a persistent 1.0 cm oval high density mass with circumscribed margins,         c. At 1 o'clock middle depth, , there is a 0.9 cm oval equal density mass with circumscribed margins      On L US:         a. At 8 o'clock 5 cm FN,  there is a 0.8 cm x 0.7 cm x 0.7 cm irregular avascular hypoechoic mass with spiculated margins, posterior shadowing, and associated architectural distortion           b  At 11 o'clock 3 cm FN, there is a 1.0 cm x 0.6 cm x 0.8 cm oval avascular hypoechoic mass with angular margins and posterior shadowing           c. At 1 o'clock  3 cm FN, there is a 0.9 cm x 0.4 cm x 0.8 cm parallel oval avascular hypoechoic mass with circumscribed margins   BIRADS-5 highly suspicious;biopsy recommended.    3. 1/31/2024 BL BREAST MRI- which revealed       Left Breast-        A. At 8 o'clock 5 cm FN,  there is a 1.3 x 0.9 x 0.7 cm irregular, enhancing mass with spiculated margins (biopsy proven malignancy)        B. At 1 o'clock 3 cm FN, there is a 0.9 x 0.5 x 1.0 cm oval, enhancing mass with circumscribed margins (biopsy proven benign fibroadenomatoid change and sclerosing adenosis)         C. At 11 o'clock 3 cm FN, there is a 1.0 x 0.9 x 0.8 cm oval, enhancing mass with circumscribed margins.(biopsy proven fibroadenoma/fibroadenomatoid change)         Right Breast- .no suspicious areas of enhancement or areas of architectural distortion are seen.  There is no skin thickening or nipple retraction.  No axillary or internal mammary lymphadenopathy is identified.     BIRADS-6 Known biopsy proven malignancy    4. 10/1/2024 BL DG MG at [facility] -BIRADS 2: 1) New, expected post-therapy change of the left breast and left axilla is benign.  2) No mammographic evidence of malignancy in either breast.      Pathology:  1/16/2024 Ultrasound-guided Core Needle Biopsy Left Breast 8 o'clock 5 cm FN-        - Invasive ductal carcinoma, grade 1 (measuring 9.0 mm)        - Ductal carcinoma in situ, grade 2 without  necrosis          ER 72%          NM 84%          HER 2 ezio 1+          Ki67 10%     2. 2024 Ultrasound-guided Core Needle Biopsy Left Breast 11 o'clock 3 cm FN-        - Fibroadenoma/ fibroadenomatoid change  3. 2024 Ultrasound-guided Core Needle Biopsy Left Breast 1 o'clock 3 cm FN-        - Fibroadenomatoid change and sclerosing adenosis  4. 2024 Left Lumpectomy with Left [place] Lymph Node Biopsy which revealed invasive ductal carcinoma, grade 1 (measuring 0.9 cm); and lobular carcinoma in situ.  All margins clear.  Three sentinel lymph nodes negative for metastaic carcinoma.        OB/GYN History:  Age at Menarche Onset: 13  Menopausal Status: premenopausal, LMP: No LMP recorded. Patient is perimenopausal.  Hysterectomy/Oophorectomy: NA, at age NA  Hormonal birth control (duration): Yes OCP (estrogen/progesterone).   Pregnancy History:   Age at first live birth: 25  Hormone Replacement Therapy: No, none  Patient did not breast feed.  Patient denies nipple discharge.   Patient denies to previous breast biopsy.   Patient denies to a personal history of breast cancer.     Other Relevant History:  Prior thoracic RT: none  Genetic testing: No  Ashkenazi Shinto descent: No     Family History:  Family History   Problem Relation Name Age of Onset    Liver cancer Father      Hepatitis Maternal Grandmother      Cancer Maternal Grandmother      Breast cancer Maternal Aunt Claudia Ho 69    Cancer Maternal Aunt Claudia Ho     Brain cancer Maternal Cousin  28        Past History:  Past Medical History:   Diagnosis Date    Anxiety     Cancer     breast left    History of seizures     At the age of 3yo        Past Surgical History:   Procedure Laterality Date     SECTION      COLONOSCOPY  07/15/2024    Dr. CHICHO Waterman/Repeat colon in 10 years    MASTECTOMY, PARTIAL Left 2024    Procedure: MASTECTOMY, PARTIAL - LEFT Need Sentimag Need Faxitron Need sterile charms and sterile ink;  Surgeon:  "Teri Greenberg MD;  Location: [facility] [place];  Service: General;  Laterality: Left;  Need Sentimag  Need Faxitron  Need sterile charms and sterile ink    SENTINEL LYMPH NODE BIOPSY Left 02/19/2024    Procedure: BIOPSY, LYMPH NODE, SENTINEL - LEFT  need nuc med need marco antonio node;  Surgeon: Teri Greenberg MD;  Location: [facility] [place];  Service: General;  Laterality: Left;  need nuc med  need marco antonio node        Social History     Socioeconomic History    Marital status:    Tobacco Use    Smoking status: Former     Types: Cigarettes    Smokeless tobacco: Never   Substance and Sexual Activity    Alcohol use: Not Currently    Drug use: Never    Sexual activity: Yes     Partners: Male     Birth control/protection: Condom        Body mass index is 35.53 kg/m².     Allergy/Medications:   Review of patient's allergies indicates:   Allergen Reactions    Adhesive      Skin tear          Current Outpatient Medications:     azithromycin (Z-SUSAN) 250 MG tablet, Take 2 tablets by mouth on day 1; Take 1 tablet by mouth on days 2-5, Disp: 6 tablet, Rfl: 0    diphenhydrAMINE (BENADRYL) 25 mg capsule, Take 25 mg by mouth every 6 (six) hours as needed for Itching., Disp: , Rfl:     FLUoxetine 20 MG capsule, Take 1 capsule (20 mg total) by mouth once daily., Disp: 90 capsule, Rfl: 3    letrozole (FEMARA) 2.5 mg Tab, Take 1 tablet (2.5 mg total) by mouth once daily., Disp: 30 tablet, Rfl: 2    ondansetron (ZOFRAN) 4 MG tablet, Take 1 tablet (4 mg total) by mouth every 6 (six) hours., Disp: 12 tablet, Rfl: 0       Review of Systems:  Review of Systems   All other systems reviewed and are negative.          Objective:     Vitals:  Blood pressure 123/86, pulse 73, temperature 98.4 °F (36.9 °C), temperature source Oral, resp. rate 18, height 5' 4" (1.626 m), weight 93.9 kg (207 lb), last menstrual period 05/17/2024, SpO2 96%.      Physical Exam:  General: The patient is awake, alert and oriented times three. The patient is well nourished and in no " acute distress.  Neck: There is no evidence of palpable cervical, supraclavicular or axillary adenopathy. The neck is supple. The thyroid is not enlarged.  Musculoskeletal: The patient has a normal range of motion of her bilateral upper extremities.  Chest: Examination of the chest wall fails to reveal any obvious abnormalities. Nonlabored breathing, symmetric expansion.  Breast:  Right:  Examination of right breast fails to reveal any dominant masses or areas of significant focal nodularity. The nipple is everted without evidence of discharge. There is no skin dimpling with movement of the pectoralis. There are no significant skin changes overlying the breast.   Left: Well healed periareolar scar. Well healed scar in the axilla. Mild hyperpigmentation noted to the entire left breast from radiation therapy. Examination of the left breast fails to reveal any dominant masses or areas of significant focal nodularity. The nipple is everted without evidence of discharge. There is no skin dimpling with movement of the pectoralis. There are no significant skin changes overlying the breast.  Abdomen: The abdomen is soft, flat, nontender and nondistended.  Integumentary: no rashes or skin lesions present  Neurologic: cranial nerves intact, no signs of peripheral neurological deficit, motor/sensory function intact      Assessment and Plan:     Encounter Diagnoses   Name Primary?    Malignant neoplasm of lower-inner quadrant of left breast in female, estrogen receptor positive Yes                   Plan:       She is recommended for supplemental screening with breast MRIs. Ideally 6 months apart from bilateral screening mammograms. This will be due in April 2025.    Also recommend follow up L DG MG in April, ideally this should be done after MRI.    RTC in 6 months for CBE.    Continue adjuvant endocrine therapy and follow up with oncology for management.        All of her questions were answered. She was advised to call if  she develops any questions or concerns.    Sofia Hauser PA-C           Total time on the date of the visit ranged from 30-39 mins ([id number]). Total time includes both face-to-face and non-face-to-face time personally spent by myself on the day of the visit.    Non-face-to-face time included:  _X_ preparing to see the patient such as reviewing the patient record  __ obtaining and reviewing separately obtained history  _X_ independently interpreting results  _X_ documenting clinical information in electronic health record.    Face-to-face time included:  _X_ performing an appropriate history and examination  _X_ communicating results to the patient  _X_ counseling and educating the patient  __ ordering appropriate medications  _x_ ordering appropriate tests  _X_ ordering appropriate procedures (including follow-up)  _X_ answering any questions the patient had    Total Time spent on date of visit: 35 minutes

## 2025-01-06 ENCOUNTER — INFUSION (OUTPATIENT)
Dept: INFUSION THERAPY | Facility: HOSPITAL | Age: 45
End: 2025-01-06
Attending: STUDENT IN AN ORGANIZED HEALTH CARE EDUCATION/TRAINING PROGRAM
Payer: COMMERCIAL

## 2025-01-06 ENCOUNTER — HOSPITAL ENCOUNTER (OUTPATIENT)
Facility: HOSPITAL | Age: 45
Discharge: HOME | End: 2025-01-07
Attending: EMERGENCY MEDICINE | Admitting: INTERNAL MEDICINE
Payer: COMMERCIAL

## 2025-01-06 VITALS
DIASTOLIC BLOOD PRESSURE: 66 MMHG | OXYGEN SATURATION: 96 % | HEART RATE: 40 BPM | SYSTOLIC BLOOD PRESSURE: 111 MMHG | TEMPERATURE: 98 F | RESPIRATION RATE: 18 BRPM

## 2025-01-06 DIAGNOSIS — Z17.0 MALIGNANT NEOPLASM OF LOWER-INNER QUADRANT OF LEFT BREAST IN FEMALE, ESTROGEN RECEPTOR POSITIVE: Primary | ICD-10-CM

## 2025-01-06 DIAGNOSIS — C50.919 MALIGNANT NEOPLASM OF FEMALE BREAST, UNSPECIFIED ESTROGEN RECEPTOR STATUS, UNSPECIFIED LATERALITY, UNSPECIFIED SITE OF BREAST: ICD-10-CM

## 2025-01-06 DIAGNOSIS — R00.1 SYMPTOMATIC BRADYCARDIA: Primary | ICD-10-CM

## 2025-01-06 DIAGNOSIS — R00.1 BRADYCARDIA: ICD-10-CM

## 2025-01-06 DIAGNOSIS — C50.312 MALIGNANT NEOPLASM OF LOWER-INNER QUADRANT OF LEFT BREAST IN FEMALE, ESTROGEN RECEPTOR POSITIVE: Primary | ICD-10-CM

## 2025-01-06 DIAGNOSIS — R07.9 CHEST PAIN: ICD-10-CM

## 2025-01-06 DIAGNOSIS — I49.8 SINUS ARRHYTHMIA: ICD-10-CM

## 2025-01-06 LAB
ALBUMIN SERPL-MCNC: 3.7 G/DL (ref 3.5–5)
ALBUMIN/GLOB SERPL: 1 RATIO (ref 1.1–2)
ALP SERPL-CCNC: 86 UNIT/L (ref 40–150)
ALT SERPL-CCNC: 8 UNIT/L (ref 0–55)
ANION GAP SERPL CALC-SCNC: 11 MEQ/L
AST SERPL-CCNC: 16 UNIT/L (ref 5–34)
B-HCG UR QL: NEGATIVE
BACTERIA #/AREA URNS AUTO: ABNORMAL /HPF
BASOPHILS # BLD AUTO: 0.03 X10(3)/MCL
BASOPHILS NFR BLD AUTO: 0.4 %
BILIRUB SERPL-MCNC: 0.3 MG/DL
BILIRUB UR QL STRIP.AUTO: NEGATIVE
BUN SERPL-MCNC: 6.3 MG/DL (ref 7–18.7)
CALCIUM SERPL-MCNC: 9.4 MG/DL (ref 8.4–10.2)
CHLORIDE SERPL-SCNC: 104 MMOL/L (ref 98–107)
CLARITY UR: CLEAR
CO2 SERPL-SCNC: 28 MMOL/L (ref 22–29)
COLOR UR AUTO: ABNORMAL
CREAT SERPL-MCNC: 0.78 MG/DL (ref 0.55–1.02)
CREAT/UREA NIT SERPL: 8
EOSINOPHIL # BLD AUTO: 0.13 X10(3)/MCL (ref 0–0.9)
EOSINOPHIL NFR BLD AUTO: 1.8 %
ERYTHROCYTE [DISTWIDTH] IN BLOOD BY AUTOMATED COUNT: 12.5 % (ref 11.5–17)
GFR SERPLBLD CREATININE-BSD FMLA CKD-EPI: >60 ML/MIN/1.73/M2
GLOBULIN SER-MCNC: 3.6 GM/DL (ref 2.4–3.5)
GLUCOSE SERPL-MCNC: 97 MG/DL (ref 74–100)
GLUCOSE UR QL STRIP: NORMAL
HCT VFR BLD AUTO: 43.9 % (ref 37–47)
HGB BLD-MCNC: 14.1 G/DL (ref 12–16)
HGB UR QL STRIP: NEGATIVE
IMM GRANULOCYTES # BLD AUTO: 0.02 X10(3)/MCL (ref 0–0.04)
IMM GRANULOCYTES NFR BLD AUTO: 0.3 %
KETONES UR QL STRIP: NEGATIVE
LEUKOCYTE ESTERASE UR QL STRIP: 500
LYMPHOCYTES # BLD AUTO: 1.64 X10(3)/MCL (ref 0.6–4.6)
LYMPHOCYTES NFR BLD AUTO: 22.6 %
MCH RBC QN AUTO: 30.2 PG (ref 27–31)
MCHC RBC AUTO-ENTMCNC: 32.1 G/DL (ref 33–36)
MCV RBC AUTO: 94 FL (ref 80–94)
MONOCYTES # BLD AUTO: 0.45 X10(3)/MCL (ref 0.1–1.3)
MONOCYTES NFR BLD AUTO: 6.2 %
MUCOUS THREADS URNS QL MICRO: ABNORMAL /LPF
NEUTROPHILS # BLD AUTO: 4.98 X10(3)/MCL (ref 2.1–9.2)
NEUTROPHILS NFR BLD AUTO: 68.7 %
NITRITE UR QL STRIP: NEGATIVE
NRBC BLD AUTO-RTO: 0 %
OHS QRS DURATION: 66 MS
OHS QTC CALCULATION: 433 MS
PH UR STRIP: 6 [PH]
PLATELET # BLD AUTO: 312 X10(3)/MCL (ref 130–400)
PMV BLD AUTO: 10 FL (ref 7.4–10.4)
POTASSIUM SERPL-SCNC: 3.6 MMOL/L (ref 3.5–5.1)
PROT SERPL-MCNC: 7.3 GM/DL (ref 6.4–8.3)
PROT UR QL STRIP: NEGATIVE
RBC # BLD AUTO: 4.67 X10(6)/MCL (ref 4.2–5.4)
RBC #/AREA URNS AUTO: ABNORMAL /HPF
SODIUM SERPL-SCNC: 143 MMOL/L (ref 136–145)
SP GR UR STRIP.AUTO: 1.02 (ref 1–1.03)
SQUAMOUS #/AREA URNS LPF: ABNORMAL /HPF
TROPONIN I SERPL-MCNC: <0.01 NG/ML (ref 0–0.04)
UROBILINOGEN UR STRIP-ACNC: NORMAL
WBC # BLD AUTO: 7.25 X10(3)/MCL (ref 4.5–11.5)
WBC #/AREA URNS AUTO: ABNORMAL /HPF

## 2025-01-06 PROCEDURE — 93005 ELECTROCARDIOGRAM TRACING: CPT

## 2025-01-06 PROCEDURE — 80307 DRUG TEST PRSMV CHEM ANLYZR: CPT | Performed by: EMERGENCY MEDICINE

## 2025-01-06 PROCEDURE — 84481 FREE ASSAY (FT-3): CPT | Performed by: EMERGENCY MEDICINE

## 2025-01-06 PROCEDURE — 85025 COMPLETE CBC W/AUTO DIFF WBC: CPT | Performed by: PHYSICIAN ASSISTANT

## 2025-01-06 PROCEDURE — 81025 URINE PREGNANCY TEST: CPT | Performed by: PHYSICIAN ASSISTANT

## 2025-01-06 PROCEDURE — 80053 COMPREHEN METABOLIC PANEL: CPT | Performed by: PHYSICIAN ASSISTANT

## 2025-01-06 PROCEDURE — 93010 ELECTROCARDIOGRAM REPORT: CPT | Mod: ,,, | Performed by: STUDENT IN AN ORGANIZED HEALTH CARE EDUCATION/TRAINING PROGRAM

## 2025-01-06 PROCEDURE — 84443 ASSAY THYROID STIM HORMONE: CPT | Performed by: EMERGENCY MEDICINE

## 2025-01-06 PROCEDURE — 87086 URINE CULTURE/COLONY COUNT: CPT | Performed by: PHYSICIAN ASSISTANT

## 2025-01-06 PROCEDURE — 84436 ASSAY OF TOTAL THYROXINE: CPT | Performed by: EMERGENCY MEDICINE

## 2025-01-06 PROCEDURE — 81001 URINALYSIS AUTO W/SCOPE: CPT | Performed by: PHYSICIAN ASSISTANT

## 2025-01-06 PROCEDURE — 82077 ASSAY SPEC XCP UR&BREATH IA: CPT | Performed by: EMERGENCY MEDICINE

## 2025-01-06 PROCEDURE — 84484 ASSAY OF TROPONIN QUANT: CPT | Performed by: PHYSICIAN ASSISTANT

## 2025-01-06 PROCEDURE — 83735 ASSAY OF MAGNESIUM: CPT | Performed by: EMERGENCY MEDICINE

## 2025-01-06 NOTE — FIRST PROVIDER EVALUATION
Medical screening examination initiated.  I have conducted a focused provider triage encounter, findings are as follows:    Brief history of present illness:  44-year-old female presents to ED for evaluation of low pulse rate.  Patient was at the infusion center waiting to get a chemo shot when her pulse rate was found to be 30.  Patient was feeling weak and fatigued.  Sent to ED for further evaluation    Vitals:    01/06/25 1405   BP: 117/72   BP Location: Left arm   Patient Position: Sitting   Pulse: (!) 49   Resp: 19   Temp: 98 °F (36.7 °C)   TempSrc: Oral   SpO2: 99%   Weight: 93 kg (205 lb)       Pertinent physical exam:  Patient is awake and alert and oriented.     Brief workup plan:  labs, EKG, UA, UPT    Preliminary workup initiated; this workup will be continued and followed by the physician or advanced practice provider that is assigned to the patient when roomed.

## 2025-01-06 NOTE — Clinical Note
Diagnosis: Symptomatic bradycardia [670121]   Future Attending Provider: LUPIS GREER [[id number]]   Admit to which facility:: [facility] [[id number]]

## 2025-01-06 NOTE — NURSING
"Pt here for scheduled zoladex injection.  Pulse in the 30's-40's, c/o fatigue.  Pulse irregular.  Denies history of heart or thyroid problems, denies any recent change in medications.  Did report taking "apothecary tincture" last night.  Guera Hurt NP notified and called to infusion area to examine pt.  Order to hold zoladex today and go to ER for evaluation.  Will call to r/s injection after evaluated.   Transferred to ER per wc.  "

## 2025-01-07 VITALS
HEART RATE: 61 BPM | BODY MASS INDEX: 35.19 KG/M2 | WEIGHT: 205 LBS | TEMPERATURE: 99 F | SYSTOLIC BLOOD PRESSURE: 107 MMHG | RESPIRATION RATE: 18 BRPM | DIASTOLIC BLOOD PRESSURE: 60 MMHG | OXYGEN SATURATION: 99 %

## 2025-01-07 PROBLEM — R00.1 BRADYCARDIA: Status: ACTIVE | Noted: 2025-01-07

## 2025-01-07 LAB
ALBUMIN SERPL-MCNC: 3.4 G/DL (ref 3.5–5)
ALBUMIN/GLOB SERPL: 1.1 RATIO (ref 1.1–2)
ALP SERPL-CCNC: 82 UNIT/L (ref 40–150)
ALT SERPL-CCNC: 12 UNIT/L (ref 0–55)
AMPHET UR QL SCN: NEGATIVE
ANION GAP SERPL CALC-SCNC: 10 MEQ/L
APICAL FOUR CHAMBER EJECTION FRACTION: 52 %
APICAL TWO CHAMBER EJECTION FRACTION: 54 %
AST SERPL-CCNC: 13 UNIT/L (ref 5–34)
AV INDEX (PROSTH): 0.59
AV MEAN GRADIENT: 4 MMHG
AV PEAK GRADIENT: 7.8 MMHG
AV VALVE AREA BY VELOCITY RATIO: 1.8 CM²
AV VALVE AREA: 1.8 CM²
AV VELOCITY RATIO: 0.57
BARBITURATE SCN PRESENT UR: NEGATIVE
BASOPHILS # BLD AUTO: 0.02 X10(3)/MCL
BASOPHILS NFR BLD AUTO: 0.2 %
BENZODIAZ UR QL SCN: NEGATIVE
BILIRUB SERPL-MCNC: 0.3 MG/DL
BUN SERPL-MCNC: 8.8 MG/DL (ref 7–18.7)
CALCIUM SERPL-MCNC: 8.8 MG/DL (ref 8.4–10.2)
CANNABINOIDS UR QL SCN: POSITIVE
CHLORIDE SERPL-SCNC: 106 MMOL/L (ref 98–107)
CO2 SERPL-SCNC: 25 MMOL/L (ref 22–29)
COCAINE UR QL SCN: NEGATIVE
CREAT SERPL-MCNC: 0.65 MG/DL (ref 0.55–1.02)
CREAT/UREA NIT SERPL: 14
CV ECHO LV RWT: 0.31 CM
DOP CALC AO PEAK VEL: 1.4 M/S
DOP CALC AO VTI: 29.6 CM
DOP CALC LVOT AREA: 3.1 CM2
DOP CALC LVOT DIAMETER: 2 CM
DOP CALC LVOT PEAK VEL: 0.8 M/S
DOP CALC LVOT STROKE VOLUME: 54.6 CM3
DOP CALC MV VTI: 38.4 CM
DOP CALCLVOT PEAK VEL VTI: 17.4 CM
E WAVE DECELERATION TIME: 195 MSEC
E/A RATIO: 0.83
E/E' RATIO: 7.68 M/S
ECHO LV POSTERIOR WALL: 0.8 CM (ref 0.6–1.1)
EOSINOPHIL # BLD AUTO: 0.16 X10(3)/MCL (ref 0–0.9)
EOSINOPHIL NFR BLD AUTO: 1.5 %
ERYTHROCYTE [DISTWIDTH] IN BLOOD BY AUTOMATED COUNT: 12.6 % (ref 11.5–17)
ETHANOL SERPL-MCNC: <10 MG/DL
FENTANYL UR QL SCN: NEGATIVE
FRACTIONAL SHORTENING: 38.5 % (ref 28–44)
GFR SERPLBLD CREATININE-BSD FMLA CKD-EPI: >60 ML/MIN/1.73/M2
GLOBAL LONGITUIDAL STRAIN: 18.2 %
GLOBULIN SER-MCNC: 3.2 GM/DL (ref 2.4–3.5)
GLUCOSE SERPL-MCNC: 102 MG/DL (ref 74–100)
HCT VFR BLD AUTO: 38.6 % (ref 37–47)
HGB BLD-MCNC: 12.6 G/DL (ref 12–16)
HR MV ECHO: 79 BPM
IMM GRANULOCYTES # BLD AUTO: 0.04 X10(3)/MCL (ref 0–0.04)
IMM GRANULOCYTES NFR BLD AUTO: 0.4 %
INTERVENTRICULAR SEPTUM: 0.9 CM (ref 0.6–1.1)
LEFT ATRIUM AREA SYSTOLIC (APICAL 2 CHAMBER): 16.3 CM2
LEFT ATRIUM AREA SYSTOLIC (APICAL 4 CHAMBER): 17.4 CM2
LEFT ATRIUM SIZE: 3.9 CM
LEFT INTERNAL DIMENSION IN SYSTOLE: 3.2 CM (ref 2.1–4)
LEFT VENTRICLE DIASTOLIC VOLUME: 130 ML
LEFT VENTRICLE END DIASTOLIC VOLUME APICAL 2 CHAMBER: 101 ML
LEFT VENTRICLE END DIASTOLIC VOLUME APICAL 4 CHAMBER: 120 ML
LEFT VENTRICLE END SYSTOLIC VOLUME APICAL 2 CHAMBER: 45.5 ML
LEFT VENTRICLE END SYSTOLIC VOLUME APICAL 4 CHAMBER: 38.1 ML
LEFT VENTRICLE SYSTOLIC VOLUME: 41 ML
LEFT VENTRICULAR INTERNAL DIMENSION IN DIASTOLE: 5.2 CM (ref 3.5–6)
LEFT VENTRICULAR MASS: 156.9 G
LV LATERAL E/E' RATIO: 6.64 M/S
LV SEPTAL E/E' RATIO: 9.13 M/S
LVED V (TEICH): 130 ML
LVES V (TEICH): 41 ML
LVOT MG: 1 MMHG
LVOT MV: 0.56 CM/S
LYMPHOCYTES # BLD AUTO: 2.47 X10(3)/MCL (ref 0.6–4.6)
LYMPHOCYTES NFR BLD AUTO: 23.3 %
MAGNESIUM SERPL-MCNC: 2.2 MG/DL (ref 1.6–2.6)
MAGNESIUM SERPL-MCNC: 2.3 MG/DL (ref 1.6–2.6)
MCH RBC QN AUTO: 30.6 PG (ref 27–31)
MCHC RBC AUTO-ENTMCNC: 32.6 G/DL (ref 33–36)
MCV RBC AUTO: 93.7 FL (ref 80–94)
MDMA UR QL SCN: NEGATIVE
MONOCYTES # BLD AUTO: 0.7 X10(3)/MCL (ref 0.1–1.3)
MONOCYTES NFR BLD AUTO: 6.6 %
MV MEAN GRADIENT: 1 MMHG
MV PEAK A VEL: 0.88 M/S
MV PEAK E VEL: 0.73 M/S
MV PEAK GRADIENT: 3 MMHG
MV STENOSIS PRESSURE HALF TIME: 83 MS
MV VALVE AREA BY CONTINUITY EQUATION: 1.42 CM2
MV VALVE AREA P 1/2 METHOD: 2.65 CM2
NEUTROPHILS # BLD AUTO: 7.21 X10(3)/MCL (ref 2.1–9.2)
NEUTROPHILS NFR BLD AUTO: 68 %
NRBC BLD AUTO-RTO: 0 %
OHS LV EJECTION FRACTION SIMPSONS BIPLANE MOD: 53 %
OPIATES UR QL SCN: NEGATIVE
PCP UR QL: NEGATIVE
PH UR: 6 [PH] (ref 3–11)
PHOSPHATE SERPL-MCNC: 3.2 MG/DL (ref 2.3–4.7)
PISA TR MAX VEL: 2.1 M/S
PLATELET # BLD AUTO: 281 X10(3)/MCL (ref 130–400)
PMV BLD AUTO: 10.1 FL (ref 7.4–10.4)
POTASSIUM SERPL-SCNC: 3.4 MMOL/L (ref 3.5–5.1)
PROT SERPL-MCNC: 6.6 GM/DL (ref 6.4–8.3)
RA PRESSURE ESTIMATED: 3 MMHG
RBC # BLD AUTO: 4.12 X10(6)/MCL (ref 4.2–5.4)
RV TB RVSP: 5 MMHG
SINUS: 3.1 CM
SODIUM SERPL-SCNC: 141 MMOL/L (ref 136–145)
SPECIFIC GRAVITY, URINE AUTO (.000) (OHS): 1.02 (ref 1–1.03)
T3FREE SERPL-MCNC: 3.04 PG/ML (ref 1.58–3.91)
T4 SERPL-MCNC: 6.95 UG/DL (ref 4.87–11.72)
TDI LATERAL: 0.11 M/S
TDI SEPTAL: 0.08 M/S
TDI: 0.1 M/S
TR MAX PG: 18 MMHG
TRICUSPID ANNULAR PLANE SYSTOLIC EXCURSION: 1.73 CM
TSH SERPL-ACNC: 2.83 UIU/ML (ref 0.35–4.94)
TV REST PULMONARY ARTERY PRESSURE: 21 MMHG
WBC # BLD AUTO: 10.6 X10(3)/MCL (ref 4.5–11.5)

## 2025-01-07 PROCEDURE — G0378 HOSPITAL OBSERVATION PER HR: HCPCS

## 2025-01-07 PROCEDURE — 80053 COMPREHEN METABOLIC PANEL: CPT

## 2025-01-07 PROCEDURE — 85025 COMPLETE CBC W/AUTO DIFF WBC: CPT

## 2025-01-07 PROCEDURE — 83735 ASSAY OF MAGNESIUM: CPT

## 2025-01-07 PROCEDURE — 84100 ASSAY OF PHOSPHORUS: CPT

## 2025-01-07 RX ORDER — IBUPROFEN 200 MG
24 TABLET ORAL
Status: DISCONTINUED | OUTPATIENT
Start: 2025-01-07 | End: 2025-01-07 | Stop reason: HOSPADM

## 2025-01-07 RX ORDER — ALUMINUM HYDROXIDE, MAGNESIUM HYDROXIDE, AND SIMETHICONE 1200; 120; 1200 MG/30ML; MG/30ML; MG/30ML
30 SUSPENSION ORAL 4 TIMES DAILY PRN
Status: DISCONTINUED | OUTPATIENT
Start: 2025-01-07 | End: 2025-01-07 | Stop reason: HOSPADM

## 2025-01-07 RX ORDER — ONDANSETRON HYDROCHLORIDE 2 MG/ML
4 INJECTION, SOLUTION INTRAVENOUS EVERY 4 HOURS PRN
Status: DISCONTINUED | OUTPATIENT
Start: 2025-01-07 | End: 2025-01-07 | Stop reason: HOSPADM

## 2025-01-07 RX ORDER — TALC
6 POWDER (GRAM) TOPICAL NIGHTLY PRN
Status: DISCONTINUED | OUTPATIENT
Start: 2025-01-07 | End: 2025-01-07 | Stop reason: HOSPADM

## 2025-01-07 RX ORDER — ACETAMINOPHEN 500 MG
1000 TABLET ORAL EVERY 6 HOURS PRN
Status: DISCONTINUED | OUTPATIENT
Start: 2025-01-07 | End: 2025-01-07 | Stop reason: HOSPADM

## 2025-01-07 RX ORDER — ENOXAPARIN SODIUM 100 MG/ML
40 INJECTION SUBCUTANEOUS EVERY 24 HOURS
Status: DISCONTINUED | OUTPATIENT
Start: 2025-01-07 | End: 2025-01-07 | Stop reason: HOSPADM

## 2025-01-07 RX ORDER — SODIUM CHLORIDE 0.9 % (FLUSH) 0.9 %
10 SYRINGE (ML) INJECTION
Status: DISCONTINUED | OUTPATIENT
Start: 2025-01-07 | End: 2025-01-07 | Stop reason: HOSPADM

## 2025-01-07 RX ORDER — GLUCAGON 1 MG
1 KIT INJECTION
Status: DISCONTINUED | OUTPATIENT
Start: 2025-01-07 | End: 2025-01-07 | Stop reason: HOSPADM

## 2025-01-07 RX ORDER — POLYETHYLENE GLYCOL 3350 17 G/17G
17 POWDER, FOR SOLUTION ORAL 2 TIMES DAILY PRN
Status: DISCONTINUED | OUTPATIENT
Start: 2025-01-07 | End: 2025-01-07 | Stop reason: HOSPADM

## 2025-01-07 RX ORDER — IBUPROFEN 200 MG
16 TABLET ORAL
Status: DISCONTINUED | OUTPATIENT
Start: 2025-01-07 | End: 2025-01-07 | Stop reason: HOSPADM

## 2025-01-07 RX ORDER — BISACODYL 10 MG/1
10 SUPPOSITORY RECTAL DAILY PRN
Status: DISCONTINUED | OUTPATIENT
Start: 2025-01-07 | End: 2025-01-07 | Stop reason: HOSPADM

## 2025-01-07 NOTE — H&P
[facility]  Hospital Medicine History & Physical Examination       Patient Name: Brittnee Wakefield  MRN: [id number]  Patient Class: OP- Observation   Admission Date: 01/07/2025   Admitting Service: Hospital Medicine   Length of Stay: 0  Attending Physician: Dr. Parrish   Primary Care Provider: Keyla Snowden MD  Face-to-Face encounter date: 01/07/2025  Code Status: Full  Chief Complaint: Bradycardia (Sent here from infusion center, Hx breast cancer on Chemo shot, at infusion center they report her HR was 30 and felt fatigued.)      Patient information was obtained from patient, patient's family, past medical records and ER records.    HISTORY OF PRESENT ILLNESS:   Brittnee Wakefield is a 45 y.o. female with a PMHx of anxiety, childhood seizures, stage IA hormone positive left breast cancer s/p XRT, currently on letrozole presented to [facility] on 1/6/2025 from the Cancer Center in a for evaluation of bradycardia.  [facility] and was noted to be bradycardic with pulse in 30s.  Patient endorsed generalized weakness and fatigue, denied any syncopal episode, chest pain, shortness of breath.  Of note, patient used a THC tincture on 1/5/25, which was obtained with valid prescription.     Vital Signs upon presentation to the ED included /72, HR 49, RR 19, SpO2 99%, temperature 98° F.  Labs grossly unremarkable.  Troponin undetectable.  TSH within normal range.  Alcohol level undetectable.  UDS positive for cannabinoids.  Urinalysis with 500 leukocytes and 11-20 WBCs.  Urine culture pending.  EKG demonstrated sinus rhythm with sinus arrhythmia and occasional PVCs.  Cardiology consulted in ED.  Admitted to hospital medicine service.    REVIEW OF SYSTEMS:   Except as documented, all other systems reviewed and negative.    PAST MEDICAL HISTORY:   Anxiety   History of childhood seizures   Stage IA hormone positive left breast cancer s/p  XRT on chemotherapy  Osteopenia    PAST SURGICAL HISTORY:     Past Surgical History:   Procedure Laterality Date     SECTION      COLONOSCOPY  07/15/2024    Dr. CHICHO Waterman/Repeat colon in 10 years    MASTECTOMY, PARTIAL Left 2024    Procedure: MASTECTOMY, PARTIAL - LEFT Need Sentimag Need Faxitron Need sterile charms and sterile ink;  Surgeon: Teri Greenberg MD;  Location: [facility] OR;  Service: General;  Laterality: Left;  Need Sentimag  Need Faxitron  Need sterile charms and sterile ink    SENTINEL LYMPH NODE BIOPSY Left 2024    Procedure: BIOPSY, LYMPH NODE, SENTINEL - LEFT  need nuc med need marco antonio node;  Surgeon: Teri Greenberg MD;  Location: [facility] OR;  Service: General;  Laterality: Left;  need nuc med  need marco antonio node       FAMILY HISTORY:   Reviewed and negative    SOCIAL HISTORY:   Denied alcohol, tobacco or illicit drug use.     SCREENING FOR SOCIAL DRIVERS FOR HEALTH:   Patient screened for food insecurity, housing instability, transportation needs, utility difficulties, and interpersonal safety (select all that apply as identified as concern):  []Housing or Food  []Transportation Needs  []Utility Difficulties  []Interpersonal safety  [x]None    ALLERGIES:   Adhesive    HOME MEDICATIONS:     Prior to Admission medications    Medication Sig Start Date End Date Taking? Authorizing Provider   azithromycin (Z-SUSAN) 250 MG tablet Take 2 tablets by mouth on day 1; Take 1 tablet by mouth on days 2-5 24   Keyla Snowden MD   diphenhydrAMINE (BENADRYL) 25 mg capsule Take 25 mg by mouth every 6 (six) hours as needed for Itching.    Provider, Historical   FLUoxetine 20 MG capsule Take 1 capsule (20 mg total) by mouth once daily. 24   Keyla Snowden MD   letrozole (FEMARA) 2.5 mg Tab Take 1 tablet (2.5 mg total) by mouth once daily. 24  Lejeune, Elizabeth Kennedy, MD   ondansetron (ZOFRAN) 4 MG tablet Take 1 tablet (4 mg total) by mouth every 6 (six) hours. 24    Ramone Galarza MD     ________________________________________________________________________  INPATIENT LIST OF MEDICATIONS     Current Facility-Administered Medications:     acetaminophen tablet 1,000 mg, 1,000 mg, Oral, Q6H PRN, Ximena Hernandez, FNP    aluminum-magnesium hydroxide-simethicone 200-200-20 mg/5 mL suspension 30 mL, 30 mL, Oral, QID PRN, Valeria Hernandezsa, FNP    bisacodyL suppository 10 mg, 10 mg, Rectal, Daily PRN, Valeria Hernandezsa, FNP    dextrose 50% injection 12.5 g, 12.5 g, Intravenous, PRN, David, Ximena, FNP    dextrose 50% injection 25 g, 25 g, Intravenous, PRN, David Ximena, FNP    enoxaparin injection 40 mg, 40 mg, Subcutaneous, Daily, Ximena Hernandez, FNP    glucagon (human recombinant) injection 1 mg, 1 mg, Intramuscular, PRN, David Ximena, FNP    glucose chewable tablet 16 g, 16 g, Oral, PRN, Lowell, Ximena, FNP    glucose chewable tablet 24 g, 24 g, Oral, PRN, David, Ximena, FNP    melatonin tablet 6 mg, 6 mg, Oral, Nightly PRN, David Ximena, FNP    ondansetron injection 4 mg, 4 mg, Intravenous, Q4H PRN, Carlyle Hernandezyssa, FNP    polyethylene glycol packet 17 g, 17 g, Oral, BID PRN, David Ximena, FNP    sodium chloride 0.9% flush 10 mL, 10 mL, Intravenous, PRN, David Ximena, FNP    Current Outpatient Medications:     azithromycin (Z-SUSAN) 250 MG tablet, Take 2 tablets by mouth on day 1; Take 1 tablet by mouth on days 2-5, Disp: 6 tablet, Rfl: 0    diphenhydrAMINE (BENADRYL) 25 mg capsule, Take 25 mg by mouth every 6 (six) hours as needed for Itching., Disp: , Rfl:     FLUoxetine 20 MG capsule, Take 1 capsule (20 mg total) by mouth once daily., Disp: 90 capsule, Rfl: 3    letrozole (FEMARA) 2.5 mg Tab, Take 1 tablet (2.5 mg total) by mouth once daily., Disp: 30 tablet, Rfl: 2    ondansetron (ZOFRAN) 4 MG tablet, Take 1 tablet (4 mg total) by mouth every 6 (six) hours., Disp: 12 tablet, Rfl: 0    Scheduled Meds:   enoxparin  40 mg Subcutaneous Daily     Continuous  Infusions:  PRN Meds:.  Current Facility-Administered Medications:     acetaminophen, 1,000 mg, Oral, Q6H PRN    aluminum-magnesium hydroxide-simethicone, 30 mL, Oral, QID PRN    bisacodyL, 10 mg, Rectal, Daily PRN    dextrose 50%, 12.5 g, Intravenous, PRN    dextrose 50%, 25 g, Intravenous, PRN    glucagon (human recombinant), 1 mg, Intramuscular, PRN    glucose, 16 g, Oral, PRN    glucose, 24 g, Oral, PRN    melatonin, 6 mg, Oral, Nightly PRN    ondansetron, 4 mg, Intravenous, Q4H PRN    polyethylene glycol, 17 g, Oral, BID PRN    sodium chloride 0.9%, 10 mL, Intravenous, PRN    PHYSICAL EXAM:     VITAL SIGNS: 24 HRS MIN & MAX LAST   Temp  Min: 97.7 °F (36.5 °C)  Max: 98 °F (36.7 °C) 97.7 °F (36.5 °C)   BP  Min: 111/66  Max: 151/65 132/74   Pulse  Min: 40  Max: 77  66   Resp  Min: 16  Max: 19 16   SpO2  Min: 95 %  Max: 100 % 95 %       General appearance: Well-developed female in no apparent distress.  HENT: Atraumatic head. Moist mucous membranes of oral cavity.  Eyes: Normal extraocular movements.   Neck: Supple.   Lungs: Clear to auscultation bilaterally.   Heart: Regular rate and rhythm. S1 and S2 present. No pedal edema.  Abdomen: Soft, non-distended, non-tender.  Extremities: No cyanosis, clubbing, or edema.  Skin: No Rash.   Neuro: Motor and sensory exams grossly intact.   Psych/mental status: Awake and alert. Appropriate mood and affect. Responds appropriately to questions.     LABS AND IMAGING:     Recent Labs   Lab 01/06/25  1418 01/07/25  0354   WBC 7.25 10.60   RBC 4.67 4.12*   HGB 14.1 12.6   HCT 43.9 38.6   MCV 94.0 93.7   MCH 30.2 30.6   MCHC 32.1* 32.6*   RDW 12.5 12.6    281   MPV 10.0 10.1       Recent Labs   Lab 01/06/25  1418 01/06/25  2355 01/07/25  0354     --  141   K 3.6  --  3.4*     --  106   CO2 28  --  25   BUN 6.3*  --  8.8   CREATININE 0.78  --  0.65   CALCIUM 9.4  --  8.8   MG  --  2.30 2.20   ALBUMIN 3.7  --  3.4*   ALKPHOS 86  --  82   ALT 8  --  12   AST 16  --   13   BILITOT 0.3  --  0.3       Microbiology Results (last 7 days)       Procedure Component Value Units Date/Time    Urine culture [[id number]] Collected: 01/06/25 1419    Order Status: Completed Specimen: Urine Updated: 01/07/25 0629     Urine Culture No Growth At 24 Hours             Mammo Digital Diagnostic Bilat with Rachel   - MAMMO DIGITAL DIAGNOSTIC BILAT WITH RACHEL    BILATERAL DIGITAL DIAGNOSTIC MAMMOGRAM 3D/2D WITH CAD: 10/1/2024  HISTORY: 44-year-old female presents for her first left breast imaging after left breast conservation therapy. She is also due for annual mammography of her right breast. Personal history of left breast grade 1 invasive ductal carcinoma and LCIS status post left lumpectomy and sentinel lymph node biopsy on 02/19/24 (negative margins ; 3 negative SLNs), followed by adjuvant radiation therapy and endocrine therapy.     Prior benign core needle biopsies as follows:   - Left breast 1:00 axis 3 cm FN mass (T1-barrel clip): Benign fibroadenomatoid change and sclerosing adenosis.   - Left breast 11:00 axis 3 cm FN (T4-butterfly clip): Benign fibroadenoma/fibroadenomatoid change.      COMPARISONS: Comparison is made to exams dated:  2/19/2024 specimen, 2/15/2024 localization, 1/31/2024 breast MRI, 12/13/2023 mammogram, 12/13/2023 ultrasound - [facility], and 7/11/2019 mammogram - [facility].      TECHNIQUE: Digital mammography views were performed with tomosynthesis. Current study was evaluated with a Computer Aided Detection (CAD) system.     BREAST COMPOSITION: The breasts are heterogeneously dense, which may obscure small masses.      FINDINGS:     Bilateral Diagnostic Mammogram:  There is new, expected post-therapy change of the left breast and left axilla related to interval lumpectomy, whole breast radiation therapy, and sentinel lymph node biopsy.  There is expected postsurgical scarring and surgical clips in the left breast along the  8:00-9:00 axes lumpectomy bed.    There is stable postsurgical change and a T1-barrel clip in the left breast 1:00 axis middle depth related to prior benign core needle biopsy.  No detrimental interval change at this benign core needle biopsy site.    There is stable postsurgical change and a T4-butterfly clip in the left breast 11:00 axis anterior depth related to prior benign core needle biopsy.  No detrimental interval change at this benign core needle biopsy site.      No suspicious masses, calcifications, or other findings are seen in either breast.      IMPRESSION: BENIGN  1) New, expected post-therapy change of the left breast and left axilla is benign.  BI-RADS 2: Benign.      2) No mammographic evidence of malignancy in either breast.      RECOMMENDATIONS:  Recommend annual supplemental breast screening with dynamic contrast enhanced breast MRI in this patient with a personal history of breast cancer diagnosed prior to age 49 and dense breast tissue.  Ideally, annual bilateral mammography and breast MRI are alternated every 6 months. To establish such a schedule, breast MRI would be due April, 2025.      Recommend continued follow-up according to the post breast conservation therapy protocol.  Next exam due is left diagnostic mammogram with tomosynthesis in 6 months (April, 2025).  The above recommended breast MRI should be performed before the left diagnostic mammogram.    I discussed the above findings and recommendations with the patient in the office today.    Cuauhtemoc Tellez M.D.            kl/:10/1/2024 12:14:50      letter sent: Mammography Normal    Mammogram BI-RADS: 2 Benign        ASSESSMENT:   Sinus Bradycardia    Stage IA hormone positive left breast cancer s/p XRT, currently on letrozole     History:  anxiety, childhood seizures      PLAN:   Cardiac monitoring   Cardiology consulted in ED, appreciate recommendations   Echocardiogram pending   Fall precautions  Resume home medications as deemed  appropriate once medication reconciliation is updated  Labs in AM    VTE Prophylaxis:  Lovenox    Discharge Planning and Disposition: TBD    Alina ALVAREZ, NP have reviewed and discussed the case with Dr. Parrish.  Please see the attending MD's addendum for further assessment and plan.    Alina Espinal, AGACNP-BC  Department of Hospital Medicine   [facility]   01/07/2025    This note was created with the assistance of [employer] Voice Recognition Software. There may be transcription errors as a result of using this technology however minimal, and effort has been made to assure accuracy of transcription, but any obvious errors or omissions should be clarified with the author of the document.    _______________________________________________________________________________  MD Addendum:  Dr. KIM , assumed care of this patient today at --am/pm  For the patient encounter, I performed the substantive portion of the visit, I reviewed the NP/PA documentation, treatment plan, and medical decision making.  I had face to face time with this patient     A. History:    B. Physical exam:    C. Medical decision making:      All diagnosis and differential diagnosis have been reviewed; assessment and plan has been documented; I have personally reviewed the labs and test results that are presently available; I have reviewed the patients medication list; I have reviewed the consulting providers response and recommendations. I have reviewed or attempted to review medical records based upon their availability.    All of the patient and family questions have been addressed and answered. Patient's is agreeable to the above stated plan. I will continue to monitor closely and make adjustments to medical management as needed.      01/07/2025

## 2025-01-07 NOTE — CONSULTS
Inpatient consult to Cardiology  Consult performed by: Cassidy Kang FNP  Consult ordered by: Clarke Sanders MD  Reason for consult: Symptomatic Bradycardia        [facility] *    Cardiology  Consult Note    Patient Name: Brittnee Wakefield  MRN: [id number]  Admission Date: 2025  Hospital Length of Stay: 0 days  Code Status: Full Code   Attending Provider: Jaswinder Santoro MD   Consulting Provider: TATE Torres  Primary Care Physician: Keyla Snowden MD  Principal Problem:<principal problem not specified>    Patient information was obtained from patient, past medical records, and ER records.     Subjective:     Reason for Consult: Symptomatic Bradycardia     HPI: Ms. Wakefield is a 45 year old female who is unknown to [facility]. She presents to the ER with complaints of low heart rate while waiting to get chemo at the infusion center. Her pulse was found to be in the 30's, and she reports feeling weak & fatigued. She reports that she has felt tired over the last few days but denies CP, SOB, palpitations, nausea, vomiting, fever, or chills. Of note, she was diagnosed with Breast CA in 2023. She reports that she took a Tincture from the Apothecary the night prior that contains a THC containing product. Significant labs include K 3.4 and UDS + for cannabinoids. An EKG was obtained and demonstrated SR w/ sinus arrhythmia w/ occasional PVC's. [facility] has been consulted to further evaluate the patient's abnormal heart rate.       PMH: breast CA, anxiety, seizures  PSH: , colonoscopy, partial mastectomy, sentinel node biopsy   Family History: Father - Liver CA. Family hx of brain CA & breast CA  Social History: Former tobacco use. Denies alcohol or illicit drug use.     Previous Cardiac Diagnostics:   None to review.     Review of patient's allergies indicates:   Allergen Reactions    Adhesive      Skin tear     Current Facility-Administered Medications on File Prior to  Encounter   Medication    [DISCONTINUED] goserelin (ZOLADEX) injection 3.6 mg     Current Outpatient Medications on File Prior to Encounter   Medication Sig    azithromycin (Z-SUSAN) 250 MG tablet Take 2 tablets by mouth on day 1; Take 1 tablet by mouth on days 2-5    diphenhydrAMINE (BENADRYL) 25 mg capsule Take 25 mg by mouth every 6 (six) hours as needed for Itching.    FLUoxetine 20 MG capsule Take 1 capsule (20 mg total) by mouth once daily.    letrozole (FEMARA) 2.5 mg Tab Take 1 tablet (2.5 mg total) by mouth once daily.    ondansetron (ZOFRAN) 4 MG tablet Take 1 tablet (4 mg total) by mouth every 6 (six) hours.     Review of Systems   Constitutional:  Positive for fatigue.   Respiratory:  Negative for shortness of breath.    Cardiovascular:  Negative for chest pain and palpitations.   Neurological:  Positive for weakness.   All other systems reviewed and are negative.      Objective:     Vital Signs (Most Recent):  Temp: 97.7 °F (36.5 °C) (01/06/25 2000)  Pulse: 66 (01/07/25 0503)  Resp: 16 (01/07/25 0503)  BP: 132/74 (01/07/25 0503)  SpO2: 95 % (01/07/25 0503) Vital Signs (24h Range):  Temp:  [97.7 °F (36.5 °C)-98 °F (36.7 °C)] 97.7 °F (36.5 °C)  Pulse:  [40-77] 66  Resp:  [16-19] 16  SpO2:  [95 %-100 %] 95 %  BP: (111-151)/(54-97) 132/74   Weight: 93 kg (205 lb)  Body mass index is 35.19 kg/m².  SpO2: 95 %     No intake or output data in the 24 hours ending 01/07/25 0803  Lines/Drains/Airways       Peripheral Intravenous Line  Duration                  Peripheral IV - Single Lumen 01/06/25 2307 20 G Right Antecubital <1 day                  Significant Labs:   Chemistries:   Recent Labs   Lab 01/06/25  1418 01/06/25  2355 01/07/25  0354     --  141   K 3.6  --  3.4*     --  106   CO2 28  --  25   BUN 6.3*  --  8.8   CREATININE 0.78  --  0.65   CALCIUM 9.4  --  8.8   BILITOT 0.3  --  0.3   ALKPHOS 86  --  82   ALT 8  --  12   AST 16  --  13   GLUCOSE 97  --  102*   MG  --  2.30 2.20   PHOS  --    "--  3.2   TROPONINI <0.010  --   --         CBC/Anemia Labs: Coags:    Recent Labs   Lab 01/06/25  1418 01/07/25  0354   WBC 7.25 10.60   HGB 14.1 12.6   HCT 43.9 38.6    281   MCV 94.0 93.7   RDW 12.5 12.6    No results for input(s): "PT", "INR", "APTT" in the last 168 hours.     Significant Imaging:  Imaging Results    None       EKG:       Telemetry:  SR    Physical Exam  HENT:      Head: Normocephalic.      Nose: Nose normal.      Mouth/Throat:      Mouth: Mucous membranes are moist.   Eyes:      Extraocular Movements: Extraocular movements intact.   Cardiovascular:      Rate and Rhythm: Normal rate and regular rhythm.      Pulses: Normal pulses.   Pulmonary:      Effort: Pulmonary effort is normal.   Abdominal:      Palpations: Abdomen is soft.   Skin:     General: Skin is warm and dry.   Neurological:      Mental Status: She is alert and oriented to person, place, and time.   Psychiatric:         Behavior: Behavior normal.         Home Medications:   Current Facility-Administered Medications on File Prior to Encounter   Medication Dose Route Frequency Provider Last Rate Last Admin    [DISCONTINUED] goserelin (ZOLADEX) injection 3.6 mg  3.6 mg Subcutaneous 1 time in Clinic/HOD Lejeune, Elizabeth Kennedy, MD         Current Outpatient Medications on File Prior to Encounter   Medication Sig Dispense Refill    azithromycin (Z-SUSAN) 250 MG tablet Take 2 tablets by mouth on day 1; Take 1 tablet by mouth on days 2-5 6 tablet 0    diphenhydrAMINE (BENADRYL) 25 mg capsule Take 25 mg by mouth every 6 (six) hours as needed for Itching.      FLUoxetine 20 MG capsule Take 1 capsule (20 mg total) by mouth once daily. 90 capsule 3    letrozole (FEMARA) 2.5 mg Tab Take 1 tablet (2.5 mg total) by mouth once daily. 30 tablet 2    ondansetron (ZOFRAN) 4 MG tablet Take 1 tablet (4 mg total) by mouth every 6 (six) hours. 12 tablet 0     Current Schedule Inpatient Medications:   enoxparin  40 mg Subcutaneous Daily "     Continuous Infusions:    Assessment:   Sinus Bradycardia - Resolved   Breast CA  Anxiety  Seizures      Plan:   Obtain echo.   Currently SR on tele.  Will plan for MCT x 2 weeks at discharge.  Stable for discharge from cardiac standpoint if echo okay.   Pt would like to f/u with [facility] in [place].     Thank you for your consult.     Cassidy Kang, TATE  Cardiology  [facility]

## 2025-01-07 NOTE — ED PROVIDER NOTES
"Encounter Date: 2025       History     Chief Complaint   Patient presents with    Bradycardia     Sent here from [facility] center, Hx breast cancer on Chemo shot, at [facility] center they report her HR was 30 and felt fatigued.     44-year-old female history of breast cancer diagnosed 2023 started treatment with hormone blockers about 6 months ago currently taking letrozole.  Patient reports she is willing to Oncology to receive a new medication but he had not yet received it when he noticed that her pulse was in the 30s.  Patient states she felt very fatigued at the time but did not lose consciousness.  She states her last few days she has noticed that she has felt very tired but denies any chest pain or shortness of breath.  She does feel more tired when she tries to move around states she has not really been able to move around the last day or 2.  She denies any new medications to me.  Previous note states she took a" tincture" from" the [employer]."  This morning.  They report that has a THC containing product that is acquired with a prescription        Review of patient's allergies indicates:   Allergen Reactions    Adhesive      Skin tear     Past Medical History:   Diagnosis Date    Anxiety     Cancer     breast left    History of seizures     At the age of 3yo     Past Surgical History:   Procedure Laterality Date     SECTION      COLONOSCOPY  07/15/2024    Dr. CHICHO Waterman/Repeat colon in 10 years    MASTECTOMY, PARTIAL Left 2024    Procedure: MASTECTOMY, PARTIAL - LEFT Need Sentimag Need Faxitron Need sterile charms and sterile ink;  Surgeon: Teri Greenberg MD;  Location: [facility] OR;  Service: General;  Laterality: Left;  Need Sentimag  Need Faxitron  Need sterile charms and sterile ink    SENTINEL LYMPH NODE BIOPSY Left 2024    Procedure: BIOPSY, LYMPH NODE, SENTINEL - LEFT  need nuc med need marco antonio node;  Surgeon: Teri Greenberg MD;  Location: [facility] OR;  Service: " General;  Laterality: Left;  need nuc med  need marco antonio node     Family History   Problem Relation Name Age of Onset    Liver cancer Father      Hepatitis Maternal Grandmother      Cancer Maternal Grandmother      Breast cancer Maternal Aunt Claudia Ho 69    Cancer Maternal Aunt Claudia Ho     Brain cancer Maternal Cousin  28     Social History     Tobacco Use    Smoking status: Former     Types: Cigarettes    Smokeless tobacco: Never   Substance Use Topics    Alcohol use: Not Currently    Drug use: Never     Review of Systems   Constitutional:  Positive for fatigue. Negative for chills and fever.   Respiratory:  Negative for cough and shortness of breath.    Cardiovascular:  Negative for chest pain.   Gastrointestinal:  Negative for abdominal pain, nausea and vomiting.   Neurological:  Negative for headaches.   All other systems reviewed and are negative.      Physical Exam     Initial Vitals [01/06/25 1405]   BP Pulse Resp Temp SpO2   117/72 (!) 49 19 98 °F (36.7 °C) 99 %      MAP       --         Physical Exam    Nursing note and vitals reviewed.  Constitutional: She appears well-developed and well-nourished. No distress.   HENT:   Head: Normocephalic and atraumatic.   Eyes: Conjunctivae are normal.   Cardiovascular:  Normal rate and intact distal pulses.           Pulmonary/Chest: No respiratory distress. She has no rhonchi.   Abdominal: Abdomen is soft. Bowel sounds are normal. There is no abdominal tenderness. There is no rebound and no guarding.   Musculoskeletal:         General: No edema.     Neurological: She is alert and oriented to person, place, and time. She has normal strength. No cranial nerve deficit. GCS score is 15. GCS eye subscore is 4. GCS verbal subscore is 5. GCS motor subscore is 6.   Skin: Skin is warm and dry.   Psychiatric: She has a normal mood and affect.         ED Course   Procedures  Labs Reviewed   COMPREHENSIVE METABOLIC PANEL - Abnormal       Result Value    Sodium 143      Potassium  3.6      Chloride 104      CO2 28      Glucose 97      Blood Urea Nitrogen 6.3 (*)     Creatinine 0.78      Calcium 9.4      Protein Total 7.3      Albumin 3.7      Globulin 3.6 (*)     Albumin/Globulin Ratio 1.0 (*)     Bilirubin Total 0.3      ALP 86      ALT 8      AST 16      eGFR >60      Anion Gap 11.0      BUN/Creatinine Ratio 8     URINALYSIS, REFLEX TO URINE CULTURE - Abnormal    Color, UA Light-Yellow      Appearance, UA Clear      Specific Gravity, UA 1.017      pH, UA 6.0      Protein, UA Negative      Glucose, UA Normal      Ketones, UA Negative      Blood, UA Negative      Bilirubin, UA Negative      Urobilinogen, UA Normal      Nitrites, UA Negative      Leukocyte Esterase,  (*)     RBC, UA 0-5      WBC, UA 11-20 (*)     Bacteria, UA None Seen      Squamous Epithelial Cells, UA Trace      Mucous, UA Trace (*)    CBC WITH DIFFERENTIAL - Abnormal    WBC 7.25      RBC 4.67      Hgb 14.1      Hct 43.9      MCV 94.0      MCH 30.2      MCHC 32.1 (*)     RDW 12.5      Platelet 312      MPV 10.0      Neut % 68.7      Lymph % 22.6      Mono % 6.2      Eos % 1.8      Basophil % 0.4      Lymph # 1.64      Neut # 4.98      Mono # 0.45      Eos # 0.13      Baso # 0.03      IG# 0.02      IG% 0.3      NRBC% 0.0     DRUG SCREEN, URINE (BEAKER) - Abnormal    Amphetamines, Urine Negative      Barbiturates, Urine Negative      Benzodiazepine, Urine Negative      Cannabinoids, Urine Positive (*)     Cocaine, Urine Negative      Fentanyl, Urine Negative      MDMA, Urine Negative      Opiates, Urine Negative      Phencyclidine, Urine Negative      pH, Urine 6.0      Specific Gravity, Urine Auto 1.017      Narrative:     Cut off concentrations:    Amphetamines - 1000 ng/ml  Barbiturates - 200 ng/ml  Benzodiazepine - 200 ng/ml  Cannabinoids (THC) - 50 ng/ml  Cocaine - 300 ng/ml  Fentanyl - 1.0 ng/ml  MDMA - 500 ng/ml  Opiates - 300 ng/ml   Phencyclidine (PCP) - 25 ng/ml    Specimen submitted for drug analysis and  tested for pH and specific gravity in order to evaluate sample integrity. Suspect tampering if specific gravity is <1.003 and/or pH is not within the range of 4.5 - 8.0  False negatives may result form substances such as bleach added to urine.  False positives may result for the presence of a substance with similar chemical structure to the drug or its metabolite.    This test provides only a PRELIMINARY analytical test result. A more specific alternate chemical method must be used in order to obtain a confirmed analytical result. Gas chromatography/mass spectrometry (GC/MS) is the preferred confirmatory method. Other chemical confirmation methods are available. Clinical consideration and professional judgement should be applied to any drug of abuse test result, particularly when preliminary positive results are used.    Positive results will be confirmed only at the physicians request. Unconfirmed screening results are to be used only for medical purposes (treatment).        TROPONIN I - Normal    Troponin-I <0.010     PREGNANCY TEST, URINE RAPID - Normal    hCG Qualitative, Urine Negative     CULTURE, URINE   CBC W/ AUTO DIFFERENTIAL    Narrative:     The following orders were created for panel order CBC auto differential.  Procedure                               Abnormality         Status                     ---------                               -----------         ------                     CBC with Differential[[id number]]       Abnormal            Final result                 Please view results for these tests on the individual orders.   MAGNESIUM   TSH   T3,FREE ([facility] ONLY)   T4 (IN-HOUSE)   ALCOHOL,MEDICAL (ETHANOL)        ECG Results              EKG 12-lead (Final result)        Collection Time Result Time QRS Duration OHS QTC Calculation    01/06/25 14:07:27 01/06/25 23:13:11 66 433                     Wet Read by Clarke Sanders MD (01/06/25 23:13:12, [facility] - Emergency Dept,  Emergency Medicine)    EKG performed.  72 beats per minute sinus rhythm with sinus arrhythmia occasional PVCs.  No ST elevation or depression                      Final result by Interface, Lab In [facility] (01/06/25 15:25:52)                   Narrative:    Test Reason : R00.1,    Vent. Rate :  72 BPM     Atrial Rate :  72 BPM     P-R Int : 150 ms          QRS Dur :  66 ms      QT Int : 396 ms       P-R-T Axes :  15  14  30 degrees    QTcB Int : 433 ms    Sinus rhythm with sinus arrhythmia with occasional Premature ventricular  complexes  Cannot rule out Anterior infarct ,age undetermined  Abnormal ECG  Confirmed by Vladimir Cha ([id number]) on 1/6/2025 3:25:42 PM    Referred By:            Confirmed By: Vladimir Cha                                  Imaging Results    None          Medications - No data to display    Medical Decision Making  Sinus rhythm on the monitor with PVCs.  EKGs showed few PVCs in his sinus arrhythmia.  Patient has never seen a cardiologist's not having any chest pain or shortness of breath at present.  I discussed with Aimee of [facility] she does recommend admission and further cardiac monitoring add TSH , T3 and T4 levels.  [facility] will see in consultation with Internal Medicine admission    Hospitalist paged for admission    Amount and/or Complexity of Data Reviewed  Labs: ordered.                                      Clinical Impression:  Final diagnoses:  [R00.1] Bradycardia  [R00.1] Symptomatic bradycardia (Primary)  [I49.8] Sinus arrhythmia  [C50.919] Malignant neoplasm of female breast, unspecified estrogen receptor status, unspecified laterality, unspecified site of breast                 Clarke Sanders MD  01/07/25 0012

## 2025-01-08 ENCOUNTER — PATIENT MESSAGE (OUTPATIENT)
Dept: ADMINISTRATIVE | Facility: CLINIC | Age: 45
End: 2025-01-08
Payer: COMMERCIAL

## 2025-01-08 ENCOUNTER — PATIENT OUTREACH (OUTPATIENT)
Dept: ADMINISTRATIVE | Facility: CLINIC | Age: 45
End: 2025-01-08
Payer: COMMERCIAL

## 2025-01-08 LAB — BACTERIA UR CULT: NORMAL

## 2025-01-08 NOTE — PROGRESS NOTES
C3 nurse attempted to contact Brittnee Wakefield for a TCC post hospital discharge follow up call. No answer. Left voicemail with callback information. The patient has a scheduled HOSFU appointment with TATE Santizo (cardiology) on 1/10/25 @ 8:15am (will receive heart monitor on this day).

## 2025-01-08 NOTE — DISCHARGE SUMMARY
[facility] - 4th Floor [facility] MEDICINE - DISCHARGE SUMMARY    Patient Name: Brittnee Wakefield  MRN: [id number]  Admission Date: 1/6/2025  Discharge Date:  January 7  Hospital Length of Stay: 0 days  Discharge Provider: Negro Parrish MD  Primary Care Provider: Keyla Snowden MD      HOSPITAL COURSE   Brittnee Wakefield is a 45 y.o. female with a PMHx of anxiety, childhood seizures, stage IA hormone positive left breast cancer s/p XRT, currently on letrozole [facility] 1/6/[facility] in a for evaluation of bradycardia.  [facility]ade[facility] and was noted to be bradycardic with pulse in 30s.  Patient endorsed generalized weakness and fatigue, denied any syncopal episode, chest pain, shortness of breath.  Of note, patient used a THC tincture on 1/5/25, which was obtained with valid prescription.      Vital Signs upon presentation to the ED included /72, HR 49, RR 19, SpO2 99%, temperature 98° F.  Labs grossly unremarkable.  Troponin undetectable.  TSH within normal range.  Alcohol level undetectable.  UDS positive for cannabinoids.  Urinalysis with 500 leukocytes and 11-20 WBCs.  Urine culture pending.  EKG demonstrated sinus rhythm with sinus arrhythmia and occasional PVCs.  Cardiology consulted in ED.  Admitted to hospital medicine service.    .  Stable at this time in a normal sinus rhythm with frequent PVC.  Seen by Cardiology and recommends discharge with outpatient cardiac monitor.  This has been set up for her.        PHYSICAL EXAM     Most Recent Vital Signs:  Temp: 98.6 °F (37 °C) (01/07/25 1542)  Pulse: 61 (01/07/25 1542)  Resp: 18 (01/07/25 1542)  BP: 107/60 (01/07/25 1542)  SpO2: 99 % (01/07/25 1542)   GENERAL: In no acute distress, afebrile  HEENT:  CHEST: Clear to auscultation bilaterally  HEART: S1, S2, no appreciable murmur  ABDOMEN: Soft, nontender, BS +  MSK: Warm, no lower extremity  edema, no clubbing or cyanosis  NEUROLOGIC: Alert and oriented x4, moving all extremities with good strength   INTEGUMENTARY:  PSYCHIATRY:          DISCHARGE DIAGNOSIS   Sinus Bradycardia    Stage IA hormone positive left breast cancer s/p XRT, currently on letrozole      History:  anxiety, childhood seizures        _____________________________________________________________________________      DISCHARGE MED REC     Discharge Medication List as of 1/7/2025  4:16 PM        CONTINUE these medications which have NOT CHANGED    Details   diphenhydrAMINE (BENADRYL) 25 mg capsule Take 25 mg by mouth every 6 (six) hours as needed for Itching., Historical Med      FLUoxetine 20 MG capsule Take 1 capsule (20 mg total) by mouth once daily., Starting Tue 11/26/2024, Normal      letrozole (FEMARA) 2.5 mg Tab Take 1 tablet (2.5 mg total) by mouth once daily., Starting Thu 8/22/2024, Until Fri 8/22/2025, Normal      ondansetron (ZOFRAN) 4 MG tablet Take 1 tablet (4 mg total) by mouth every 6 (six) hours., Starting Tue 1/9/2024, Normal           STOP taking these medications       azithromycin (Z-SUSAN) 250 MG tablet Comments:   Reason for Stopping:                  CONSULTS     Consults (From admission, onward)          Status Ordering Provider     Inpatient consult to Cardiology  Once        Provider:  Isabel Torres MD    Completed SILVANA ISABEL              FOLLOW UP      Follow-up Information       Erlinda Beaver FNP Follow up on 1/10/2025.    Specialty: Cardiology  Why: @ 8:15AM appointment and will receive heart monitor  Contact information:  [address] [place] [id number]  [phone]                                 DISCHARGE INSTRUCTIONS     Explained in detail to the patient about the discharge plan, medications, and follow-up visits. Pt understands and agrees with the treatment plan.  Discharged Condition: stable  Diet as tolerated  Activities as tolerated  Discharge to: Home or Self  Care    TIME SPENT ON DISCHARGE   35 minutes        Negro Parrish MD  Internal Medicine  Department of Hospital Medicine [facility] - 4th Floor Medical Telemetry      This document was created using electronic dictation services.  Please excuse any errors that may have been made.  Contact me if any questions regarding documentation to clarify verbiage.

## 2025-01-09 NOTE — PROGRESS NOTES
3rd attempt-C3 nurse attempted to contact Brittnee Wakefield for a TCC post hospital discharge follow up call. No answer. Left voicemail with callback information, and OOC#. The patient has a scheduled HOSFU appointment with TATE Santizo (cardiology) on 1/10/25 @ 8:15am (will receive heart monitor on this day).

## 2025-01-09 NOTE — PROGRESS NOTES
2nd attempt-C3 nurse attempted to contact Brittnee Lagoset for a TCC post hospital discharge follow up call. No answer. Left voicemail with callback information. The patient has a scheduled HOSFU appointment with TATE Santizo (cardiology) on 1/10/25 @ 8:15am (will receive heart monitor on this day).

## 2025-01-14 ENCOUNTER — INFUSION (OUTPATIENT)
Dept: INFUSION THERAPY | Facility: HOSPITAL | Age: 45
End: 2025-01-14
Attending: STUDENT IN AN ORGANIZED HEALTH CARE EDUCATION/TRAINING PROGRAM
Payer: COMMERCIAL

## 2025-01-14 VITALS
HEIGHT: 64 IN | DIASTOLIC BLOOD PRESSURE: 67 MMHG | HEART RATE: 73 BPM | TEMPERATURE: 98 F | SYSTOLIC BLOOD PRESSURE: 107 MMHG | WEIGHT: 205 LBS | RESPIRATION RATE: 20 BRPM | BODY MASS INDEX: 35 KG/M2

## 2025-01-14 DIAGNOSIS — C50.312 MALIGNANT NEOPLASM OF LOWER-INNER QUADRANT OF LEFT BREAST IN FEMALE, ESTROGEN RECEPTOR POSITIVE: Primary | ICD-10-CM

## 2025-01-14 DIAGNOSIS — Z17.0 MALIGNANT NEOPLASM OF LOWER-INNER QUADRANT OF LEFT BREAST IN FEMALE, ESTROGEN RECEPTOR POSITIVE: Primary | ICD-10-CM

## 2025-01-14 PROCEDURE — 96402 CHEMO HORMON ANTINEOPL SQ/IM: CPT

## 2025-01-14 PROCEDURE — 63600175 PHARM REV CODE 636 W HCPCS: Mod: JZ,TB

## 2025-01-14 RX ADMIN — GOSERELIN ACETATE 3.6 MG: 3.6 IMPLANT SUBCUTANEOUS at 02:01

## 2025-01-16 DIAGNOSIS — C50.312 MALIGNANT NEOPLASM OF LOWER-INNER QUADRANT OF LEFT BREAST IN FEMALE, ESTROGEN RECEPTOR POSITIVE: ICD-10-CM

## 2025-01-16 DIAGNOSIS — Z17.0 MALIGNANT NEOPLASM OF LOWER-INNER QUADRANT OF LEFT BREAST IN FEMALE, ESTROGEN RECEPTOR POSITIVE: ICD-10-CM

## 2025-01-16 RX ORDER — LETROZOLE 2.5 MG/1
2.5 TABLET, FILM COATED ORAL DAILY
Qty: 30 TABLET | Refills: 2 | Status: SHIPPED | OUTPATIENT
Start: 2025-01-16 | End: 2026-01-16

## 2025-02-11 ENCOUNTER — INFUSION (OUTPATIENT)
Dept: INFUSION THERAPY | Facility: HOSPITAL | Age: 45
End: 2025-02-11
Attending: STUDENT IN AN ORGANIZED HEALTH CARE EDUCATION/TRAINING PROGRAM
Payer: COMMERCIAL

## 2025-02-11 VITALS
TEMPERATURE: 98 F | OXYGEN SATURATION: 97 % | HEIGHT: 64 IN | RESPIRATION RATE: 18 BRPM | SYSTOLIC BLOOD PRESSURE: 117 MMHG | WEIGHT: 205 LBS | BODY MASS INDEX: 35 KG/M2 | HEART RATE: 66 BPM | DIASTOLIC BLOOD PRESSURE: 75 MMHG

## 2025-02-11 DIAGNOSIS — C50.312 MALIGNANT NEOPLASM OF LOWER-INNER QUADRANT OF LEFT BREAST IN FEMALE, ESTROGEN RECEPTOR POSITIVE: Primary | ICD-10-CM

## 2025-02-11 DIAGNOSIS — Z17.0 MALIGNANT NEOPLASM OF LOWER-INNER QUADRANT OF LEFT BREAST IN FEMALE, ESTROGEN RECEPTOR POSITIVE: Primary | ICD-10-CM

## 2025-02-11 DIAGNOSIS — M85.80 OSTEOPENIA, UNSPECIFIED LOCATION: ICD-10-CM

## 2025-02-11 LAB
B-HCG UR QL: NEGATIVE
CTP QC/QA: YES

## 2025-02-11 PROCEDURE — 96402 CHEMO HORMON ANTINEOPL SQ/IM: CPT

## 2025-02-11 PROCEDURE — 81025 URINE PREGNANCY TEST: CPT | Performed by: STUDENT IN AN ORGANIZED HEALTH CARE EDUCATION/TRAINING PROGRAM

## 2025-02-11 PROCEDURE — 63600175 PHARM REV CODE 636 W HCPCS: Mod: JZ,TB | Performed by: STUDENT IN AN ORGANIZED HEALTH CARE EDUCATION/TRAINING PROGRAM

## 2025-02-11 PROCEDURE — 96372 THER/PROPH/DIAG INJ SC/IM: CPT

## 2025-02-11 PROCEDURE — 63600175 PHARM REV CODE 636 W HCPCS: Mod: JZ,TB

## 2025-02-11 RX ADMIN — DENOSUMAB 60 MG: 60 INJECTION SUBCUTANEOUS at 11:02

## 2025-02-11 RX ADMIN — GOSERELIN ACETATE 3.6 MG: 3.6 IMPLANT SUBCUTANEOUS at 11:02

## 2025-02-11 NOTE — NURSING
Zoladex + Prolia given, tolerated well. Pt denied any recent dental work within the past 3 months as well as any upcoming invasive dental procedures. Pt discharged in stable condition, next appt given.  Discharged in stable condition and is aware of future appointments.

## 2025-03-11 ENCOUNTER — LAB VISIT (OUTPATIENT)
Dept: LAB | Facility: HOSPITAL | Age: 45
End: 2025-03-11
Attending: STUDENT IN AN ORGANIZED HEALTH CARE EDUCATION/TRAINING PROGRAM
Payer: COMMERCIAL

## 2025-03-11 ENCOUNTER — OFFICE VISIT (OUTPATIENT)
Dept: HEMATOLOGY/ONCOLOGY | Facility: CLINIC | Age: 45
End: 2025-03-11
Payer: COMMERCIAL

## 2025-03-11 ENCOUNTER — INFUSION (OUTPATIENT)
Dept: INFUSION THERAPY | Facility: HOSPITAL | Age: 45
End: 2025-03-11
Attending: STUDENT IN AN ORGANIZED HEALTH CARE EDUCATION/TRAINING PROGRAM
Payer: COMMERCIAL

## 2025-03-11 VITALS
HEIGHT: 64 IN | RESPIRATION RATE: 18 BRPM | OXYGEN SATURATION: 99 % | SYSTOLIC BLOOD PRESSURE: 109 MMHG | BODY MASS INDEX: 35.45 KG/M2 | DIASTOLIC BLOOD PRESSURE: 72 MMHG | WEIGHT: 207.63 LBS | TEMPERATURE: 98 F | HEART RATE: 75 BPM

## 2025-03-11 DIAGNOSIS — Z17.0 MALIGNANT NEOPLASM OF LOWER-INNER QUADRANT OF LEFT BREAST IN FEMALE, ESTROGEN RECEPTOR POSITIVE: ICD-10-CM

## 2025-03-11 DIAGNOSIS — Z79.811 LONG TERM (CURRENT) USE OF AROMATASE INHIBITORS: ICD-10-CM

## 2025-03-11 DIAGNOSIS — C50.312 MALIGNANT NEOPLASM OF LOWER-INNER QUADRANT OF LEFT BREAST IN FEMALE, ESTROGEN RECEPTOR POSITIVE: ICD-10-CM

## 2025-03-11 DIAGNOSIS — C50.312 MALIGNANT NEOPLASM OF LOWER-INNER QUADRANT OF LEFT BREAST IN FEMALE, ESTROGEN RECEPTOR POSITIVE: Primary | ICD-10-CM

## 2025-03-11 DIAGNOSIS — M85.80 OSTEOPENIA, UNSPECIFIED LOCATION: ICD-10-CM

## 2025-03-11 DIAGNOSIS — Z17.0 MALIGNANT NEOPLASM OF LOWER-INNER QUADRANT OF LEFT BREAST IN FEMALE, ESTROGEN RECEPTOR POSITIVE: Primary | ICD-10-CM

## 2025-03-11 LAB
ALBUMIN SERPL-MCNC: 3.7 G/DL (ref 3.5–5)
ALBUMIN/GLOB SERPL: 1.1 RATIO (ref 1.1–2)
ALP SERPL-CCNC: 87 UNIT/L (ref 40–150)
ALT SERPL-CCNC: 14 UNIT/L (ref 0–55)
ANION GAP SERPL CALC-SCNC: 7 MEQ/L
AST SERPL-CCNC: 16 UNIT/L (ref 5–34)
B-HCG UR QL: NEGATIVE
BASOPHILS # BLD AUTO: 0.03 X10(3)/MCL
BASOPHILS NFR BLD AUTO: 0.3 %
BILIRUB SERPL-MCNC: 0.6 MG/DL
BUN SERPL-MCNC: 10.1 MG/DL (ref 7–18.7)
CALCIUM SERPL-MCNC: 9.2 MG/DL (ref 8.4–10.2)
CHLORIDE SERPL-SCNC: 105 MMOL/L (ref 98–107)
CO2 SERPL-SCNC: 29 MMOL/L (ref 22–29)
CREAT SERPL-MCNC: 0.77 MG/DL (ref 0.55–1.02)
CREAT/UREA NIT SERPL: 13
EOSINOPHIL # BLD AUTO: 0.16 X10(3)/MCL (ref 0–0.9)
EOSINOPHIL NFR BLD AUTO: 1.7 %
ERYTHROCYTE [DISTWIDTH] IN BLOOD BY AUTOMATED COUNT: 12.3 % (ref 11.5–17)
GFR SERPLBLD CREATININE-BSD FMLA CKD-EPI: >60 ML/MIN/1.73/M2
GLOBULIN SER-MCNC: 3.5 GM/DL (ref 2.4–3.5)
GLUCOSE SERPL-MCNC: 90 MG/DL (ref 74–100)
HCT VFR BLD AUTO: 42.9 % (ref 37–47)
HGB BLD-MCNC: 14.1 G/DL (ref 12–16)
IMM GRANULOCYTES # BLD AUTO: 0.04 X10(3)/MCL (ref 0–0.04)
IMM GRANULOCYTES NFR BLD AUTO: 0.4 %
LYMPHOCYTES # BLD AUTO: 0.9 X10(3)/MCL (ref 0.6–4.6)
LYMPHOCYTES NFR BLD AUTO: 9.5 %
MCH RBC QN AUTO: 30.9 PG (ref 27–31)
MCHC RBC AUTO-ENTMCNC: 32.9 G/DL (ref 33–36)
MCV RBC AUTO: 94.1 FL (ref 80–94)
MONOCYTES # BLD AUTO: 0.33 X10(3)/MCL (ref 0.1–1.3)
MONOCYTES NFR BLD AUTO: 3.5 %
NEUTROPHILS # BLD AUTO: 8.03 X10(3)/MCL (ref 2.1–9.2)
NEUTROPHILS NFR BLD AUTO: 84.6 %
PLATELET # BLD AUTO: 313 X10(3)/MCL (ref 130–400)
PMV BLD AUTO: 9.5 FL (ref 7.4–10.4)
POTASSIUM SERPL-SCNC: 4.1 MMOL/L (ref 3.5–5.1)
PROT SERPL-MCNC: 7.2 GM/DL (ref 6.4–8.3)
RBC # BLD AUTO: 4.56 X10(6)/MCL (ref 4.2–5.4)
SODIUM SERPL-SCNC: 141 MMOL/L (ref 136–145)
WBC # BLD AUTO: 9.49 X10(3)/MCL (ref 4.5–11.5)

## 2025-03-11 PROCEDURE — 3074F SYST BP LT 130 MM HG: CPT | Mod: CPTII,S$GLB,,

## 2025-03-11 PROCEDURE — G2211 COMPLEX E/M VISIT ADD ON: HCPCS | Mod: S$GLB,,,

## 2025-03-11 PROCEDURE — 3078F DIAST BP <80 MM HG: CPT | Mod: CPTII,S$GLB,,

## 2025-03-11 PROCEDURE — 36415 COLL VENOUS BLD VENIPUNCTURE: CPT

## 2025-03-11 PROCEDURE — 99999 PR PBB SHADOW E&M-EST. PATIENT-LVL III: CPT | Mod: PBBFAC,,,

## 2025-03-11 PROCEDURE — 96402 CHEMO HORMON ANTINEOPL SQ/IM: CPT

## 2025-03-11 PROCEDURE — 1159F MED LIST DOCD IN RCRD: CPT | Mod: CPTII,S$GLB,,

## 2025-03-11 PROCEDURE — 3008F BODY MASS INDEX DOCD: CPT | Mod: CPTII,S$GLB,,

## 2025-03-11 PROCEDURE — 63600175 PHARM REV CODE 636 W HCPCS: Mod: JZ,TB

## 2025-03-11 PROCEDURE — 85025 COMPLETE CBC W/AUTO DIFF WBC: CPT

## 2025-03-11 PROCEDURE — 81025 URINE PREGNANCY TEST: CPT

## 2025-03-11 PROCEDURE — 1160F RVW MEDS BY RX/DR IN RCRD: CPT | Mod: CPTII,S$GLB,,

## 2025-03-11 PROCEDURE — 80053 COMPREHEN METABOLIC PANEL: CPT

## 2025-03-11 PROCEDURE — 99215 OFFICE O/P EST HI 40 MIN: CPT | Mod: S$GLB,,,

## 2025-03-11 RX ORDER — LETROZOLE 2.5 MG/1
2.5 TABLET, FILM COATED ORAL DAILY
Qty: 30 TABLET | Refills: 2 | Status: SHIPPED | OUTPATIENT
Start: 2025-03-11 | End: 2026-03-11

## 2025-03-11 RX ADMIN — GOSERELIN ACETATE 3.6 MG: 3.6 IMPLANT SUBCUTANEOUS at 11:03

## 2025-03-11 NOTE — PROGRESS NOTES
Subjective:       Patient ID: Brittnee Wakefield is a 45 y.o. female.    Chief Complaint: Follow up    Diagnosis:  Left Breast Cancer - ER+, MT+, HER 2-    Current Treatment:   Zoladex + Letrozole -  5/8/24- present    Treatment History:   2/19/2024 Left Lumpectomy with Left [place] Lymph Node Biopsy - Dr. Greenberg  Adjuvant XRT - 4/1/24 - 4/19/24    HPI:   46 yo F presents in March '24 for evaluation of Hormone positive Left Breast Cancer. She began to feel more tired and run down in the fall '23, therefore PCP recommended she undergo her age appropriate cancer screenings prompting her for screening MMG. At that time she was noted in the left breast to have 2 abnormal masses detected at the 8-9 o'clock and 11-12 o'clock positions. Follow up imaging revealed numerous enhancing masses throughout the left breast on DG MMG/US. She underwent breast MRI in January '24 which revealed 3 masses, all around 1cm in her left breast at different locations. She underwent biopsy and ultimately 1 of the 3 returned malignant, the other 2 were biopsy proven benign fibroadenoma changes. She ultimately had a 1.3 x 0.9 x 0.7cm irregular enhancing mass with spiculated margins at the 8 o'clock position 5 CMFN. Biopsy revealed invasive ductal carcinoma, G1, ER 72%, MT 84%, HER 2 1+, Ki67 10%. She underwent Left Breast Lumpectomy with SLNBx with Dr. Greenberg on 2/19/24. Final path consistent with 9mm of IDC measuring 9mm. 0/3 lymph nodes involved with metastatic disease. Surgical oncology had an oncotype performed which had a score of 17. She is pre menopausal at the time of diagnosis.     Her diagnosis, staging, and prognosis were discussed. The NCCN guidelines when discussing her therapy moving forward were referenced. We reviewed her oncotype in great detail given she is a T1b grade 1 lesion her tumor was not studied in the TailoRx trial, however with an oncotype of 17 there is a 1.6% benefit to chemotherapy in women <49yo, but it is not clear  if this benefit was from chemotherapy itself or the resulting ovarian suppression. She expressed understanding. She ultimately decided to forego chemotherapy.     Interval History:  Patient presents to clinic today for NP follow up appointment and labs for toxicity check on Letrozole and Zoladex.  She states she is doing okay today.  Tolerating treatment with no major issue.  Complains of intermittent bilateral knee and shoulder joint stiffness.  Reports she is a  and sits for long periods of time.   Her hot flashes are mild but tolerable.    Discussed labs in detail with patient.   Appetite and energy levels are stable.   Otherwise, she has no further complaints or concerns.      Past Medical History:   Diagnosis Date    Anxiety     Cancer     breast left    History of seizures     At the age of 1yo      Past Surgical History:   Procedure Laterality Date     SECTION      COLONOSCOPY  07/15/2024    Dr. CHICHO Waterman/Repeat colon in 10 years    MASTECTOMY, PARTIAL Left 2024    Procedure: MASTECTOMY, PARTIAL - LEFT Need Sentimag Need Faxitron Need sterile charms and sterile ink;  Surgeon: Teri Greenberg MD;  Location: [facility] OR;  Service: General;  Laterality: Left;  Need Sentimag  Need Faxitron  Need sterile charms and sterile ink    SENTINEL LYMPH NODE BIOPSY Left 2024    Procedure: BIOPSY, LYMPH NODE, SENTINEL - LEFT  need nuc med need marco antonio node;  Surgeon: Teri Greenberg MD;  Location: [facility] OR;  Service: General;  Laterality: Left;  need nuc med  need marco antonio node     Social History     Socioeconomic History    Marital status:    Tobacco Use    Smoking status: Former     Types: Cigarettes    Smokeless tobacco: Never   Substance and Sexual Activity    Alcohol use: Not Currently    Drug use: Never    Sexual activity: Yes     Partners: Male     Birth control/protection: Condom     Social Drivers of Health     Financial Resource Strain: Low Risk  (2025)    Overall  Financial Resource Strain (CARDIA)     Difficulty of Paying Living Expenses: Not hard at all   Food Insecurity: No Food Insecurity (1/7/2025)    Hunger Vital Sign     Worried About Running Out of Food in the Last Year: Never true     Ran Out of Food in the Last Year: Never true   Transportation Needs: No Transportation Needs (1/7/2025)    TRANSPORTATION NEEDS     Transportation : No   Stress: No Stress Concern Present (1/7/2025)    [employer] - Occupational Stress Questionnaire     Feeling of Stress : Not at all   Housing Stability: Unknown (1/7/2025)    Housing Stability Vital Sign     Unable to Pay for Housing in the Last Year: No     Homeless in the Last Year: No      Family History   Problem Relation Name Age of Onset    Liver cancer Father      Hepatitis Maternal Grandmother      Cancer Maternal Grandmother      Breast cancer Maternal Aunt Claudia Ho 69    Cancer Maternal Aunt Claudia Ho     Brain cancer Maternal Cousin  28      Review of patient's allergies indicates:   Allergen Reactions    Adhesive      Skin tear      Review of Systems   Constitutional:  Negative for activity change, fever and unexpected weight change.   HENT:  Negative for sore throat.    Eyes:  Negative for visual disturbance.   Respiratory:  Negative for cough and shortness of breath.    Cardiovascular:  Negative for chest pain.   Gastrointestinal:  Negative for abdominal pain, diarrhea, nausea and vomiting.   Endocrine: Negative for polyuria.   Genitourinary:  Negative for dysuria and hot flashes.   Integumentary:  Negative for rash.   Neurological:  Negative for weakness and headaches.   Hematological:  Negative for adenopathy.   Psychiatric/Behavioral:  Negative for confusion.          Objective:      Vitals:    03/11/25 1048   BP: 109/72   Pulse: 75   Resp: 18   Temp: 97.8 °F (36.6 °C)       Physical Exam  Constitutional:       General: She is not in acute distress.     Appearance: Normal appearance. She  is not ill-appearing.   HENT:      Head: Normocephalic and atraumatic.      Nose: Nose normal.      Mouth/Throat:      Mouth: Mucous membranes are moist.      Pharynx: Oropharynx is clear.   Eyes:      Extraocular Movements: Extraocular movements intact.      Conjunctiva/sclera: Conjunctivae normal.      Pupils: Pupils are equal, round, and reactive to light.   Cardiovascular:      Rate and Rhythm: Normal rate and regular rhythm.      Pulses: Normal pulses.      Heart sounds: Normal heart sounds. No murmur heard.  Pulmonary:      Effort: Pulmonary effort is normal. No respiratory distress.      Breath sounds: Normal breath sounds.   Abdominal:      General: There is no distension.      Palpations: Abdomen is soft.      Tenderness: There is no abdominal tenderness.   Musculoskeletal:         General: Normal range of motion.      Cervical back: Normal range of motion and neck supple.      Right lower leg: No edema.      Left lower leg: No edema.   Lymphadenopathy:      Cervical: No cervical adenopathy.   Skin:     General: Skin is warm and dry.   Neurological:      General: No focal deficit present.      Mental Status: She is alert and oriented to person, place, and time.       LABS AND IMAGING REVIEWED IN EPIC     Imagin2023 BL SC MG at [facility] - which revealed in L breast at 8 o'clock -9 o'clock middle depth, there is an irregular mass with spiculated margins and associated architectural distortion.  At 11 o'clock- 12 o'clock L breast anterior depth, there is an oval mass seen.   Additionally, there is an oval mass in the L breast upper outer quadrant middle depth.   No significant masses, calcifications, or other findings are seen in the R breast. BIRADS-0; additional imaging needed     2. 2023 L DG MG/ L US BREAST COMPLETE at [facility]- which revealed on MG:       a. At 8 o'clock middle depth, there is a persistent 0.8 cm irregular equal density mass with spiculated margins and associated architectural  distortion (spanning 4.5 cm)        b. At 11 o'clock anterior depth, there is a persistent 1.0 cm oval high density mass with circumscribed margins,         c. At 1 o'clock middle depth, , there is a 0.9 cm oval equal density mass with circumscribed margins      On L US:         a. At 8 o'clock 5 cm FN,  there is a 0.8 cm x 0.7 cm x 0.7 cm irregular avascular hypoechoic mass with spiculated margins, posterior shadowing, and associated architectural distortion           b  At 11 o'clock 3 cm FN, there is a 1.0 cm x 0.6 cm x 0.8 cm oval avascular hypoechoic mass with angular margins and posterior shadowing           c. At 1 o'clock  3 cm FN, there is a 0.9 cm x 0.4 cm x 0.8 cm parallel oval avascular hypoechoic mass with circumscribed margins   BIRADS-5 highly suspicious;biopsy recommended.     3. 1/31/2024 BL BREAST MRI- which revealed       Left Breast-        A. At 8 o'clock 5 cm FN,  there is a 1.3 x 0.9 x 0.7 cm irregular, enhancing mass with spiculated margins (biopsy proven malignancy)        B. At 1 o'clock 3 cm FN, there is a 0.9 x 0.5 x 1.0 cm oval, enhancing mass with circumscribed margins (biopsy proven benign fibroadenomatoid change and sclerosing adenosis)         C. At 11 o'clock 3 cm FN, there is a 1.0 x 0.9 x 0.8 cm oval, enhancing mass with circumscribed margins.(biopsy proven fibroadenoma/fibroadenomatoid change)         Right Breast- .no suspicious areas of enhancement or areas of architectural distortion are seen.  There is no skin thickening or nipple retraction.  No axillary or internal mammary lymphadenopathy is identified.     BIRADS-6 Known biopsy proven malignancy    4. 10/1/2024 BL DG MMG:   IMPRESSION: BENIGN  1) New, expected post-therapy change of the left breast and left axilla is benign.  BI-RADS 2: Benign.    2) No mammographic evidence of malignancy in either breast.       DEXA 5/15/2024  Impression:   Low bone mass.      Pathology:  1/16/2024 Ultrasound-guided Core Needle Biopsy  Left Breast 8 o'clock 5 cm FN-        - Invasive ductal carcinoma, grade 1 (measuring 9.0 mm)        - Ductal carcinoma in situ, grade 2 without necrosis          ER 72%          [place] 84%          HER 2 ezio 1+          Ki67 10%     2. 1/16/2024 Ultrasound-guided Core Needle Biopsy Left Breast 11 o'clock 3 cm FN-        - Fibroadenoma/ fibroadenomatoid change    3. 1/16/2024 Ultrasound-guided Core Needle Biopsy Left Breast 1 o'clock 3 cm FN-        - Fibroadenomatoid change and sclerosing adenosis    4. 2/19/2024 Left Lumpectomy with Left [place] Lymph Node Biopsy which revealed invasive ductal carcinoma, grade 1 (measuring 0.9 cm); and lobular carcinoma in situ.  All margins clear.  Three sentinel lymph nodes negative for metastaic carcinoma.       Assessment:   Stage 1A Left Hormone Positive Breast Cancer - T1bN0, G1. Oncotype 17. Discussed risks/benefits/toxicity/role for chemotherapy and she chose to not pursue adjuvant chemotherapy. She finished adjuvant XRT in April '24. Started on OS + AI in Feb '24 for planned 5-10 years.     Dexa due: July '26   Breast MRI due April '25  MMG due April '25  Plan:   - Toxicity reviewed; okay to continue Letrozole; refill sent today  - Continue monthly Zoladex and labs only visit; due today  - Continue Calcium and Vitamin D  - Okay to take OTC Glucosamine PRN joint stiffness  - Okay to take OTC Vitamin E PRN hot flashes  - Instructed to call the office for break through bleeding  - Continue Prolia as scheduled  - RTC 3 months for NP visit, labs same day       I spent a total of 40 minutes on the day of the visit.This includes face to face time and non-face to face time preparing to see the patient (eg, review of tests), obtaining and/or reviewing separately obtained history, documenting clinical information in the electronic or other health record, independently interpreting results and communicating results to the patient/family/caregiver, or care coordinator.      Guera  SWATI Hurt  Hematology/Oncology   [facility]

## 2025-04-11 ENCOUNTER — INFUSION (OUTPATIENT)
Dept: INFUSION THERAPY | Facility: HOSPITAL | Age: 45
End: 2025-04-11
Attending: STUDENT IN AN ORGANIZED HEALTH CARE EDUCATION/TRAINING PROGRAM
Payer: COMMERCIAL

## 2025-04-11 VITALS
HEART RATE: 65 BPM | DIASTOLIC BLOOD PRESSURE: 71 MMHG | OXYGEN SATURATION: 98 % | TEMPERATURE: 98 F | RESPIRATION RATE: 18 BRPM | SYSTOLIC BLOOD PRESSURE: 100 MMHG

## 2025-04-11 DIAGNOSIS — Z17.0 MALIGNANT NEOPLASM OF LOWER-INNER QUADRANT OF LEFT BREAST IN FEMALE, ESTROGEN RECEPTOR POSITIVE: Primary | ICD-10-CM

## 2025-04-11 DIAGNOSIS — C50.312 MALIGNANT NEOPLASM OF LOWER-INNER QUADRANT OF LEFT BREAST IN FEMALE, ESTROGEN RECEPTOR POSITIVE: Primary | ICD-10-CM

## 2025-04-11 LAB
B-HCG UR QL: NEGATIVE
CTP QC/QA: YES

## 2025-04-11 PROCEDURE — 63600175 PHARM REV CODE 636 W HCPCS: Mod: JZ,TB | Performed by: STUDENT IN AN ORGANIZED HEALTH CARE EDUCATION/TRAINING PROGRAM

## 2025-04-11 PROCEDURE — 81025 URINE PREGNANCY TEST: CPT

## 2025-04-11 PROCEDURE — 96402 CHEMO HORMON ANTINEOPL SQ/IM: CPT

## 2025-04-11 RX ADMIN — GOSERELIN ACETATE 3.6 MG: 3.6 IMPLANT SUBCUTANEOUS at 09:04

## 2025-05-05 ENCOUNTER — RESULTS FOLLOW-UP (OUTPATIENT)
Dept: SURGERY | Facility: HOSPITAL | Age: 45
End: 2025-05-05

## 2025-05-13 ENCOUNTER — INFUSION (OUTPATIENT)
Dept: INFUSION THERAPY | Facility: HOSPITAL | Age: 45
End: 2025-05-13
Attending: STUDENT IN AN ORGANIZED HEALTH CARE EDUCATION/TRAINING PROGRAM
Payer: COMMERCIAL

## 2025-05-13 VITALS
WEIGHT: 207.63 LBS | TEMPERATURE: 98 F | SYSTOLIC BLOOD PRESSURE: 123 MMHG | HEIGHT: 64 IN | OXYGEN SATURATION: 97 % | BODY MASS INDEX: 35.45 KG/M2 | HEART RATE: 64 BPM | RESPIRATION RATE: 16 BRPM | DIASTOLIC BLOOD PRESSURE: 76 MMHG

## 2025-05-13 DIAGNOSIS — Z17.0 MALIGNANT NEOPLASM OF LOWER-INNER QUADRANT OF LEFT BREAST IN FEMALE, ESTROGEN RECEPTOR POSITIVE: Primary | ICD-10-CM

## 2025-05-13 DIAGNOSIS — C50.312 MALIGNANT NEOPLASM OF LOWER-INNER QUADRANT OF LEFT BREAST IN FEMALE, ESTROGEN RECEPTOR POSITIVE: Primary | ICD-10-CM

## 2025-05-13 PROCEDURE — 96402 CHEMO HORMON ANTINEOPL SQ/IM: CPT

## 2025-05-13 PROCEDURE — 63600175 PHARM REV CODE 636 W HCPCS: Mod: JZ,TB | Performed by: STUDENT IN AN ORGANIZED HEALTH CARE EDUCATION/TRAINING PROGRAM

## 2025-05-13 RX ADMIN — GOSERELIN ACETATE 3.6 MG: 3.6 IMPLANT SUBCUTANEOUS at 11:05

## 2025-06-09 NOTE — PROGRESS NOTES
[facility] Breast Surg  Breast Surgical Oncology  Follow-Up Patient Office Visit       Referring Provider: Sofia Hauser  PCP: Keyla Snowden MD   Medical Oncologist: No care team member to display   Radiation Oncologist: Billie Butts MD   Other Care Providers:     Chief Complaint:   Chief Complaint   Patient presents with    Follow-up     Patient denies any breast related complaints, concerned about insomnia from the Zolodex injections        Subjective:   Treatment History:  2024 Left Lumpectomy with Left SLNB   2.   24 - 24 Adjuvant radiation to left breast  3.   24 Zoladex + Letrozole started     Interval History:  6/10/2025 - Brittnee Wakefield presents today for her 6 month follow up. Here for CBE. Continues Letrozole/Zoladex but reports signficant insomnia/hot flashes since starting zoladex. Denies anxiety or depression. Sees oncology tomorrow. She has no breast concerns. Breast screening is UTD.     HPI:  Brittnee Wakefield is a 45 y.o. female who initially presented on 2024 for evaluation of newly diagnosed left  breast cancer.     A detailed patient history was obtained and reviewed. She currently denies any breast issues including rashes, redness, pain, swelling, nipple discharge, or new lumps/masses.     MG breast density: Category C (heterogeneously dense)      Imagin2023 BL SC MG at [facility]- which revealed in L breast at 8 o'clock -9 o'clock middle depth, there is an irregular mass with spiculated margins and associated architectural distortion.  At 11 o'clock- 12 o'clock L breast anterior depth, there is an oval mass seen.   Additionally, there is an oval mass in the L breast upper outer quadrant middle depth.   No significant masses, calcifications, or other findings are seen in the R breast. BIRADS-0; additional imaging needed     2. 2023 L DG MG/ L US BREAST COMPLETE at [facility]- which revealed on MG:       a. At 8 o'clock middle  depth, there is a persistent 0.8 cm irregular equal density mass with spiculated margins and associated architectural distortion (spanning 4.5 cm)        b. At 11 o'clock anterior depth, there is a persistent 1.0 cm oval high density mass with circumscribed margins,         c. At 1 o'clock middle depth, , there is a 0.9 cm oval equal density mass with circumscribed margins      On L US:         a. At 8 o'clock 5 cm FN,  there is a 0.8 cm x 0.7 cm x 0.7 cm irregular avascular hypoechoic mass with spiculated margins, posterior shadowing, and associated architectural distortion           b  At 11 o'clock 3 cm FN, there is a 1.0 cm x 0.6 cm x 0.8 cm oval avascular hypoechoic mass with angular margins and posterior shadowing           c. At 1 o'clock  3 cm FN, there is a 0.9 cm x 0.4 cm x 0.8 cm parallel oval avascular hypoechoic mass with circumscribed margins   BIRADS-5 highly suspicious;biopsy recommended.    3. 1/31/2024 BL BREAST MRI- which revealed       Left Breast-        A. At 8 o'clock 5 cm FN,  there is a 1.3 x 0.9 x 0.7 cm irregular, enhancing mass with spiculated margins (biopsy proven malignancy)        B. At 1 o'clock 3 cm FN, there is a 0.9 x 0.5 x 1.0 cm oval, enhancing mass with circumscribed margins (biopsy proven benign fibroadenomatoid change and sclerosing adenosis)         C. At 11 o'clock 3 cm FN, there is a 1.0 x 0.9 x 0.8 cm oval, enhancing mass with circumscribed margins.(biopsy proven fibroadenoma/fibroadenomatoid change)         Right Breast- .no suspicious areas of enhancement or areas of architectural distortion are seen.  There is no skin thickening or nipple retraction.  No axillary or internal mammary lymphadenopathy is identified.     BIRADS-6 Known biopsy proven malignancy    4. 10/1/2024 BL DG MG at [facility] [place] -BIRADS 2: 1) New, expected post-therapy change of the left breast and left axilla is benign.  2) No mammographic evidence of malignancy in either breast.      5. 5/3/2025 Breast MRI  at [facility] [place] - BIRADS 2: 1) No MRI evidence of malignancy in either breast.  2) Expected post-therapy change of the left breast and left axilla related to prior lumpectomy, whole breast radiation therapy, and sentinel lymph node biopsy is benign.      Pathology:  2024 Ultrasound-guided Core Needle Biopsy Left Breast 8 o'clock 5 cm FN-        - Invasive ductal carcinoma, grade 1 (measuring 9.0 mm)        - Ductal carcinoma in situ, grade 2 without necrosis          ER 72%          MT 84%          HER 2 ezio 1+          Ki67 10%     2. 2024 Ultrasound-guided Core Needle Biopsy Left Breast 11 o'clock 3 cm FN-        - Fibroadenoma/ fibroadenomatoid change  3. 2024 Ultrasound-guided Core Needle Biopsy Left Breast 1 o'clock 3 cm FN-        - Fibroadenomatoid change and sclerosing adenosis  4. 2024 Left Lumpectomy with Left [place] Lymph Node Biopsy which revealed invasive ductal carcinoma, grade 1 (measuring 0.9 cm); and lobular carcinoma in situ.  All margins clear.  Three sentinel lymph nodes negative for metastaic carcinoma.        OB/GYN History:  Age at Menarche Onset: 13  Menopausal Status: premenopausal, LMP: No LMP recorded. Patient is perimenopausal.  Hysterectomy/Oophorectomy: NA, at age NA  Hormonal birth control (duration): Yes OCP (estrogen/progesterone).   Pregnancy History:   Age at first live birth: 25  Hormone Replacement Therapy: No, none  Patient did not breast feed.  Patient denies nipple discharge.   Patient denies to previous breast biopsy.   Patient denies to a personal history of breast cancer.     Other Relevant History:  Prior thoracic RT: none  Genetic testing: No  [employer] descent: No     Family History:  Family History   Problem Relation Name Age of Onset    Liver cancer Father      Hepatitis Maternal Grandmother      Cancer Maternal Grandmother      Breast cancer Maternal Aunt Claudia Ho 69    Cancer Maternal Aunt Claudia Ho     Brain cancer Maternal Cousin  28         Past History:  Past Medical History:   Diagnosis Date    Anxiety     Cancer     breast left    History of seizures     At the age of 1yo        Past Surgical History:   Procedure Laterality Date     SECTION      COLONOSCOPY  07/15/2024    Dr. CHICHO Waterman/Repeat colon in 10 years    MASTECTOMY, PARTIAL Left 2024    Procedure: MASTECTOMY, PARTIAL - LEFT Need Sentimag Need Faxitron Need sterile charms and sterile ink;  Surgeon: Teri Greenberg MD;  Location: [facility] OR;  Service: General;  Laterality: Left;  Need Sentimag  Need Faxitron  Need sterile charms and sterile ink    SENTINEL LYMPH NODE BIOPSY Left 2024    Procedure: BIOPSY, LYMPH NODE, SENTINEL - LEFT  need nuc med need marco antonio node;  Surgeon: Teri Greenberg MD;  Location: [facility] OR;  Service: General;  Laterality: Left;  need nuc med  need marco antonio node        Social History     Socioeconomic History    Marital status:    Tobacco Use    Smoking status: Former     Types: Cigarettes    Smokeless tobacco: Never   Substance and Sexual Activity    Alcohol use: Not Currently    Drug use: Never    Sexual activity: Yes     Partners: Male     Birth control/protection: Condom     Social Drivers of Health     Financial Resource Strain: Low Risk  (2025)    Overall Financial Resource Strain (CARDIA)     Difficulty of Paying Living Expenses: Not hard at all   Food Insecurity: No Food Insecurity (2025)    Hunger Vital Sign     Worried About Running Out of Food in the Last Year: Never true     Ran Out of Food in the Last Year: Never true   Transportation Needs: No Transportation Needs (2025)    TRANSPORTATION NEEDS     Transportation : No   Stress: No Stress Concern Present (2025)    [employer] [place] [employer] - Occupational Stress Questionnaire     Feeling of Stress : Not at all   Housing Stability: Unknown (2025)    Housing Stability Vital Sign     Unable to Pay for Housing in the Last Year: No     Homeless in  "the Last Year: No        Body mass index is 36.15 kg/m².     Allergy/Medications:   Review of patient's allergies indicates:   Allergen Reactions    Adhesive      Skin tear          Current Outpatient Medications:     diphenhydrAMINE (BENADRYL) 25 mg capsule, Take 25 mg by mouth every 6 (six) hours as needed for Itching., Disp: , Rfl:     diphenhydrAMINE-acetaminophen (TYLENOL PM)  mg Tab, Take 1 tablet by mouth nightly as needed., Disp: , Rfl:     FLUoxetine 20 MG capsule, Take 1 capsule (20 mg total) by mouth once daily., Disp: 90 capsule, Rfl: 3    letrozole (FEMARA) 2.5 mg Tab, Take 1 tablet (2.5 mg total) by mouth once daily., Disp: 30 tablet, Rfl: 2    ondansetron (ZOFRAN) 4 MG tablet, Take 1 tablet (4 mg total) by mouth every 6 (six) hours. (Patient not taking: Reported on 6/10/2025), Disp: 12 tablet, Rfl: 0       Review of Systems:  Review of Systems   All other systems reviewed and are negative.          Objective:     Vitals:  Blood pressure 117/84, pulse 73, temperature 97.7 °F (36.5 °C), temperature source Oral, resp. rate 18, height 5' 4" (1.626 m), weight 95.5 kg (210 lb 9.6 oz), last menstrual period 05/17/2024, SpO2 97%.      Physical Exam:  General: The patient is awake, alert and oriented times three. The patient is well nourished and in no acute distress.  Neck: There is no evidence of palpable cervical, supraclavicular or axillary adenopathy. The neck is supple. The thyroid is not enlarged.  Musculoskeletal: The patient has a normal range of motion of her bilateral upper extremities.  Chest: Examination of the chest wall fails to reveal any obvious abnormalities. Nonlabored breathing, symmetric expansion.  Breast:  Right:  Examination of right breast fails to reveal any dominant masses or areas of significant focal nodularity. The nipple is everted without evidence of discharge. There is no skin dimpling with movement of the pectoralis. There are no significant skin changes overlying the " breast.   Left: Well healed periareolar scar. Well healed scar in the axilla. Mild hyperpigmentation noted to the entire left breast from radiation therapy. Examination of the left breast fails to reveal any dominant masses or areas of significant focal nodularity. The nipple is everted without evidence of discharge. There is no skin dimpling with movement of the pectoralis. There are no significant skin changes overlying the breast.  Abdomen: The abdomen is soft, flat, nontender and nondistended.  Integumentary: no rashes or skin lesions present  Neurologic: cranial nerves intact, no signs of peripheral neurological deficit, motor/sensory function intact      Assessment and Plan:     No diagnosis found.      6/10/2025 - Brittnee Wakefield presents today for her 6 month follow up. Here for CBE. Continues Letrozole/Zoladex but reports signficant insomnia/hot flashes since starting zoladex. Denies anxiety or depression. Sees oncology tomorrow. She has no breast concerns. Breast screening is UTD.      Plan:       SCR MG in October.    Breast MRI in April 2026.     RTC in 6 months for CBE.    Continue adjuvant endocrine therapy and follow up with oncology for management.    Discussed she should discuss side effect eval/management with oncologist tomorrow.         All of her questions were answered. She was advised to call if she develops any questions or concerns.    Sofia Hauser PA-C           Total time on the date of the visit ranged from 30-39 mins (05060). Total time includes both face-to-face and non-face-to-face time personally spent by myself on the day of the visit.    Non-face-to-face time included:  _X_ preparing to see the patient such as reviewing the patient record  __ obtaining and reviewing separately obtained history  _X_ independently interpreting results  _X_ documenting clinical information in electronic health record.    Face-to-face time included:  _X_ performing an appropriate history and  examination  _X_ communicating results to the patient  _X_ counseling and educating the patient  __ ordering appropriate medications  _x_ ordering appropriate tests  _X_ ordering appropriate procedures (including follow-up)  _X_ answering any questions the patient had    Total Time spent on date of visit: 35 minutes

## 2025-06-10 ENCOUNTER — OFFICE VISIT (OUTPATIENT)
Dept: SURGERY | Facility: CLINIC | Age: 45
End: 2025-06-10
Attending: PHYSICIAN ASSISTANT
Payer: COMMERCIAL

## 2025-06-10 VITALS
HEIGHT: 64 IN | RESPIRATION RATE: 18 BRPM | OXYGEN SATURATION: 97 % | HEART RATE: 73 BPM | DIASTOLIC BLOOD PRESSURE: 84 MMHG | TEMPERATURE: 98 F | BODY MASS INDEX: 35.96 KG/M2 | SYSTOLIC BLOOD PRESSURE: 117 MMHG | WEIGHT: 210.63 LBS

## 2025-06-10 DIAGNOSIS — Z98.890 STATUS POST LYMPH NODE BIOPSY: ICD-10-CM

## 2025-06-10 DIAGNOSIS — Z90.12 STATUS POST PARTIAL MASTECTOMY OF LEFT BREAST: ICD-10-CM

## 2025-06-10 DIAGNOSIS — Z17.0 MALIGNANT NEOPLASM OF LOWER-INNER QUADRANT OF LEFT BREAST IN FEMALE, ESTROGEN RECEPTOR POSITIVE: Primary | ICD-10-CM

## 2025-06-10 DIAGNOSIS — C50.312 MALIGNANT NEOPLASM OF LOWER-INNER QUADRANT OF LEFT BREAST IN FEMALE, ESTROGEN RECEPTOR POSITIVE: Primary | ICD-10-CM

## 2025-06-10 DIAGNOSIS — D05.01 LOBULAR CARCINOMA IN SITU (LCIS) OF RIGHT BREAST: ICD-10-CM

## 2025-06-10 PROCEDURE — 3074F SYST BP LT 130 MM HG: CPT | Mod: CPTII,S$GLB,, | Performed by: PHYSICIAN ASSISTANT

## 2025-06-10 PROCEDURE — 1160F RVW MEDS BY RX/DR IN RCRD: CPT | Mod: CPTII,S$GLB,, | Performed by: PHYSICIAN ASSISTANT

## 2025-06-10 PROCEDURE — 99214 OFFICE O/P EST MOD 30 MIN: CPT | Mod: S$GLB,,, | Performed by: PHYSICIAN ASSISTANT

## 2025-06-10 PROCEDURE — 3079F DIAST BP 80-89 MM HG: CPT | Mod: CPTII,S$GLB,, | Performed by: PHYSICIAN ASSISTANT

## 2025-06-10 PROCEDURE — 3008F BODY MASS INDEX DOCD: CPT | Mod: CPTII,S$GLB,, | Performed by: PHYSICIAN ASSISTANT

## 2025-06-10 PROCEDURE — 99999 PR PBB SHADOW E&M-EST. PATIENT-LVL IV: CPT | Mod: PBBFAC,,, | Performed by: PHYSICIAN ASSISTANT

## 2025-06-10 PROCEDURE — 1159F MED LIST DOCD IN RCRD: CPT | Mod: CPTII,S$GLB,, | Performed by: PHYSICIAN ASSISTANT

## 2025-06-11 ENCOUNTER — LAB VISIT (OUTPATIENT)
Dept: LAB | Facility: HOSPITAL | Age: 45
End: 2025-06-11
Attending: STUDENT IN AN ORGANIZED HEALTH CARE EDUCATION/TRAINING PROGRAM
Payer: COMMERCIAL

## 2025-06-11 ENCOUNTER — OFFICE VISIT (OUTPATIENT)
Dept: HEMATOLOGY/ONCOLOGY | Facility: CLINIC | Age: 45
End: 2025-06-11
Payer: COMMERCIAL

## 2025-06-11 ENCOUNTER — INFUSION (OUTPATIENT)
Dept: INFUSION THERAPY | Facility: HOSPITAL | Age: 45
End: 2025-06-11
Attending: STUDENT IN AN ORGANIZED HEALTH CARE EDUCATION/TRAINING PROGRAM
Payer: COMMERCIAL

## 2025-06-11 VITALS
BODY MASS INDEX: 35.54 KG/M2 | HEART RATE: 82 BPM | DIASTOLIC BLOOD PRESSURE: 84 MMHG | WEIGHT: 208.19 LBS | HEIGHT: 64 IN | RESPIRATION RATE: 18 BRPM | SYSTOLIC BLOOD PRESSURE: 131 MMHG | TEMPERATURE: 98 F | OXYGEN SATURATION: 96 %

## 2025-06-11 DIAGNOSIS — C50.312 MALIGNANT NEOPLASM OF LOWER-INNER QUADRANT OF LEFT BREAST IN FEMALE, ESTROGEN RECEPTOR POSITIVE: ICD-10-CM

## 2025-06-11 DIAGNOSIS — Z17.0 MALIGNANT NEOPLASM OF LOWER-INNER QUADRANT OF LEFT BREAST IN FEMALE, ESTROGEN RECEPTOR POSITIVE: Primary | ICD-10-CM

## 2025-06-11 DIAGNOSIS — Z79.811 LONG TERM (CURRENT) USE OF AROMATASE INHIBITORS: ICD-10-CM

## 2025-06-11 DIAGNOSIS — Z17.0 MALIGNANT NEOPLASM OF LOWER-INNER QUADRANT OF LEFT BREAST IN FEMALE, ESTROGEN RECEPTOR POSITIVE: ICD-10-CM

## 2025-06-11 DIAGNOSIS — C50.312 MALIGNANT NEOPLASM OF LOWER-INNER QUADRANT OF LEFT BREAST IN FEMALE, ESTROGEN RECEPTOR POSITIVE: Primary | ICD-10-CM

## 2025-06-11 LAB
ALBUMIN SERPL-MCNC: 3.7 G/DL (ref 3.5–5)
ALBUMIN/GLOB SERPL: 0.9 RATIO (ref 1.1–2)
ALP SERPL-CCNC: 76 UNIT/L (ref 40–150)
ALT SERPL-CCNC: 17 UNIT/L (ref 0–55)
ANION GAP SERPL CALC-SCNC: 9 MEQ/L
AST SERPL-CCNC: 19 UNIT/L (ref 11–45)
B-HCG UR QL: NEGATIVE
BASOPHILS # BLD AUTO: 0.02 X10(3)/MCL
BASOPHILS NFR BLD AUTO: 0.4 %
BILIRUB SERPL-MCNC: 0.5 MG/DL
BUN SERPL-MCNC: 10.9 MG/DL (ref 7–18.7)
CALCIUM SERPL-MCNC: 9.2 MG/DL (ref 8.4–10.2)
CHLORIDE SERPL-SCNC: 108 MMOL/L (ref 98–107)
CO2 SERPL-SCNC: 27 MMOL/L (ref 22–29)
CREAT SERPL-MCNC: 0.74 MG/DL (ref 0.55–1.02)
CREAT/UREA NIT SERPL: 15
EOSINOPHIL # BLD AUTO: 0.1 X10(3)/MCL (ref 0–0.9)
EOSINOPHIL NFR BLD AUTO: 2 %
ERYTHROCYTE [DISTWIDTH] IN BLOOD BY AUTOMATED COUNT: 12.5 % (ref 11.5–17)
GFR SERPLBLD CREATININE-BSD FMLA CKD-EPI: >60 ML/MIN/1.73/M2
GLOBULIN SER-MCNC: 3.9 GM/DL (ref 2.4–3.5)
GLUCOSE SERPL-MCNC: 109 MG/DL (ref 74–100)
HCT VFR BLD AUTO: 42 % (ref 37–47)
HGB BLD-MCNC: 13.8 G/DL (ref 12–16)
IMM GRANULOCYTES # BLD AUTO: 0.01 X10(3)/MCL (ref 0–0.04)
IMM GRANULOCYTES NFR BLD AUTO: 0.2 %
LYMPHOCYTES # BLD AUTO: 1.62 X10(3)/MCL (ref 0.6–4.6)
LYMPHOCYTES NFR BLD AUTO: 32.8 %
MCH RBC QN AUTO: 30.9 PG (ref 27–31)
MCHC RBC AUTO-ENTMCNC: 32.9 G/DL (ref 33–36)
MCV RBC AUTO: 94 FL (ref 80–94)
MONOCYTES # BLD AUTO: 0.39 X10(3)/MCL (ref 0.1–1.3)
MONOCYTES NFR BLD AUTO: 7.9 %
NEUTROPHILS # BLD AUTO: 2.8 X10(3)/MCL (ref 2.1–9.2)
NEUTROPHILS NFR BLD AUTO: 56.7 %
PLATELET # BLD AUTO: 328 X10(3)/MCL (ref 130–400)
PMV BLD AUTO: 9.6 FL (ref 7.4–10.4)
POTASSIUM SERPL-SCNC: 3.7 MMOL/L (ref 3.5–5.1)
PROT SERPL-MCNC: 7.6 GM/DL (ref 6.4–8.3)
RBC # BLD AUTO: 4.47 X10(6)/MCL (ref 4.2–5.4)
SODIUM SERPL-SCNC: 144 MMOL/L (ref 136–145)
WBC # BLD AUTO: 4.94 X10(3)/MCL (ref 4.5–11.5)

## 2025-06-11 PROCEDURE — 3079F DIAST BP 80-89 MM HG: CPT | Mod: CPTII,S$GLB,,

## 2025-06-11 PROCEDURE — 80053 COMPREHEN METABOLIC PANEL: CPT

## 2025-06-11 PROCEDURE — 1160F RVW MEDS BY RX/DR IN RCRD: CPT | Mod: CPTII,S$GLB,,

## 2025-06-11 PROCEDURE — 63600175 PHARM REV CODE 636 W HCPCS: Mod: JZ,TB | Performed by: STUDENT IN AN ORGANIZED HEALTH CARE EDUCATION/TRAINING PROGRAM

## 2025-06-11 PROCEDURE — G2211 COMPLEX E/M VISIT ADD ON: HCPCS | Mod: S$GLB,,,

## 2025-06-11 PROCEDURE — 99999 PR PBB SHADOW E&M-EST. PATIENT-LVL III: CPT | Mod: PBBFAC,,,

## 2025-06-11 PROCEDURE — 36415 COLL VENOUS BLD VENIPUNCTURE: CPT

## 2025-06-11 PROCEDURE — 96402 CHEMO HORMON ANTINEOPL SQ/IM: CPT

## 2025-06-11 PROCEDURE — 99215 OFFICE O/P EST HI 40 MIN: CPT | Mod: S$GLB,,,

## 2025-06-11 PROCEDURE — 3075F SYST BP GE 130 - 139MM HG: CPT | Mod: CPTII,S$GLB,,

## 2025-06-11 PROCEDURE — 85025 COMPLETE CBC W/AUTO DIFF WBC: CPT

## 2025-06-11 PROCEDURE — 1159F MED LIST DOCD IN RCRD: CPT | Mod: CPTII,S$GLB,,

## 2025-06-11 PROCEDURE — 81025 URINE PREGNANCY TEST: CPT

## 2025-06-11 PROCEDURE — 3008F BODY MASS INDEX DOCD: CPT | Mod: CPTII,S$GLB,,

## 2025-06-11 RX ORDER — LETROZOLE 2.5 MG/1
2.5 TABLET, FILM COATED ORAL DAILY
Qty: 30 TABLET | Refills: 2 | Status: SHIPPED | OUTPATIENT
Start: 2025-06-11 | End: 2026-06-11

## 2025-06-11 RX ADMIN — GOSERELIN ACETATE 3.6 MG: 3.6 IMPLANT SUBCUTANEOUS at 11:06

## 2025-06-11 NOTE — PLAN OF CARE
Plan of care reviewed with patient. Zoladex Q4W (R) completed. Appts reviewed with patient; patient uses portal.

## 2025-06-11 NOTE — PROGRESS NOTES
Subjective:       Patient ID: Brittnee Wakefield is a 45 y.o. female.    Chief Complaint: Follow up    Diagnosis:  Left Breast Cancer - ER+, UT+, HER 2-    Current Treatment:   Zoladex + Letrozole -  5/8/24- present    Treatment History:   2/19/2024 Left Lumpectomy with Left [place] Lymph Node Biopsy - Dr. Greenberg  Adjuvant XRT - 4/1/24 - 4/19/24    HPI:   44 yo F presents in March '24 for evaluation of Hormone positive Left Breast Cancer. She began to feel more tired and run down in the fall '23, therefore PCP recommended she undergo her age appropriate cancer screenings prompting her for screening MMG. At that time she was noted in the left breast to have 2 abnormal masses detected at the 8-9 o'clock and 11-12 o'clock positions. Follow up imaging revealed numerous enhancing masses throughout the left breast on DG MMG/US. She underwent breast MRI in January '24 which revealed 3 masses, all around 1cm in her left breast at different locations. She underwent biopsy and ultimately 1 of the 3 returned malignant, the other 2 were biopsy proven benign fibroadenoma changes. She ultimately had a 1.3 x 0.9 x 0.7cm irregular enhancing mass with spiculated margins at the 8 o'clock position 5 CMFN. Biopsy revealed invasive ductal carcinoma, G1, ER 72%, UT 84%, HER 2 1+, Ki67 10%. She underwent Left Breast Lumpectomy with SLNBx with Dr. Greenberg on 2/19/24. Final path consistent with 9mm of IDC measuring 9mm. 0/3 lymph nodes involved with metastatic disease. Surgical oncology had an oncotype performed which had a score of 17. She is pre menopausal at the time of diagnosis.     Her diagnosis, staging, and prognosis were discussed. The NCCN guidelines when discussing her therapy moving forward were referenced. We reviewed her oncotype in great detail given she is a T1b grade 1 lesion her tumor was not studied in the TailoRx trial, however with an oncotype of 17 there is a 1.6% benefit to chemotherapy in women <49yo, but it is not clear  if this benefit was from chemotherapy itself or the resulting ovarian suppression. She expressed understanding. She ultimately decided to forego chemotherapy.     Interval History:  Patient presents to clinic today for NP follow up appointment and labs for toxicity check on Letrozole and Zoladex.  She states she is doing okay today.  Tolerating treatment with no major issue.  Complains of interrupted sleep pattern she attributes to worsening hot flashes.   Reports her interrupted sleep pattern started during adjuvant radiation.   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   She has attempted Melatonin without success.     Discussed labs in detail with patient.   Appetite and energy levels are stable.   Otherwise, she has no further complaints or concerns.      Past Medical History:   Diagnosis Date    Anxiety     Cancer     breast left    History of seizures     At the age of 1yo      Past Surgical History:   Procedure Laterality Date     SECTION      COLONOSCOPY  07/15/2024    Dr. CHICHO Waterman/Repeat colon in 10 years    MASTECTOMY, PARTIAL Left 2024    Procedure: MASTECTOMY, PARTIAL - LEFT Need Sentimag Need Faxitron Need sterile charms and sterile ink;  Surgeon: Teri Greenberg MD;  Location: [facility];  Service: General;  Laterality: Left;  Need Sentimag  Need Faxitron  Need sterile charms and sterile ink    SENTINEL LYMPH NODE BIOPSY Left 2024    Procedure: BIOPSY, LYMPH NODE, SENTINEL - LEFT  need nuc med need marco antonio node;  Surgeon: Teri Greenberg MD;  Location: [facility] OR;  Service: General;  Laterality: Left;  need nuc med  need marco antonio node     Social History     Socioeconomic History    Marital status:    Tobacco Use    Smoking status: Former     Types: Cigarettes    Smokeless tobacco: Never   Substance and Sexual Activity    Alcohol use: Not Currently    Drug use: Never     Sexual activity: Yes     Partners: Male     Birth control/protection: Condom     Social Drivers of Health     Financial Resource Strain: Low Risk  (1/7/2025)    Overall Financial Resource Strain (CARDIA)     Difficulty of Paying Living Expenses: Not hard at all   Food Insecurity: No Food Insecurity (1/7/2025)    Hunger Vital Sign     Worried About Running Out of Food in the Last Year: Never true     Ran Out of Food in the Last Year: Never true   Transportation Needs: No Transportation Needs (1/7/2025)    TRANSPORTATION NEEDS     Transportation : No   Stress: No Stress Concern Present (1/7/2025)    [employer] [place] [employer] - Occupational Stress Questionnaire     Feeling of Stress : Not at all   Housing Stability: Unknown (1/7/2025)    Housing Stability Vital Sign     Unable to Pay for Housing in the Last Year: No     Homeless in the Last Year: No      Family History   Problem Relation Name Age of Onset    Liver cancer Father      Hepatitis Maternal Grandmother      Cancer Maternal Grandmother      Breast cancer Maternal Aunt Claudia Ho 69    Cancer Maternal Aunt Claudia Ho     Brain cancer Maternal Cousin  28      Review of patient's allergies indicates:   Allergen Reactions    Adhesive      Skin tear      Review of Systems   Constitutional:  Negative for activity change, fever and unexpected weight change.   HENT:  Negative for sore throat.    Eyes:  Negative for visual disturbance.   Respiratory:  Negative for cough and shortness of breath.    Cardiovascular:  Negative for chest pain.   Gastrointestinal:  Negative for abdominal pain, diarrhea, nausea and vomiting.   Endocrine: Negative for polyuria.   Genitourinary:  Positive for hot flashes and vaginal dryness. Negative for dysuria.   Integumentary:  Negative for rash.   Neurological:  Negative for weakness and headaches.   Hematological:  Negative for adenopathy.   Psychiatric/Behavioral:  Positive for sleep disturbance. Negative for confusion.  The patient is nervous/anxious.          Objective:      Vitals:    25 1038   BP: 131/84   Pulse: 82   Resp: 18   Temp: 97.6 °F (36.4 °C)       Physical Exam  Constitutional:       General: She is not in acute distress.     Appearance: Normal appearance. She is not ill-appearing.   HENT:      Head: Normocephalic and atraumatic.      Nose: Nose normal.      Mouth/Throat:      Mouth: Mucous membranes are moist.      Pharynx: Oropharynx is clear.   Eyes:      Extraocular Movements: Extraocular movements intact.      Conjunctiva/sclera: Conjunctivae normal.      Pupils: Pupils are equal, round, and reactive to light.   Cardiovascular:      Rate and Rhythm: Normal rate and regular rhythm.      Pulses: Normal pulses.      Heart sounds: Normal heart sounds. No murmur heard.  Pulmonary:      Effort: Pulmonary effort is normal. No respiratory distress.      Breath sounds: Normal breath sounds.   Abdominal:      General: There is no distension.      Palpations: Abdomen is soft.      Tenderness: There is no abdominal tenderness.   Musculoskeletal:         General: Normal range of motion.      Cervical back: Normal range of motion and neck supple.      Right lower leg: No edema.      Left lower leg: No edema.   Lymphadenopathy:      Cervical: No cervical adenopathy.   Skin:     General: Skin is warm and dry.   Neurological:      General: No focal deficit present.      Mental Status: She is alert and oriented to person, place, and time.         2025   [facility] PEQ ENDOCRINE THERAPY   I have hot flashes/hot flushes 3   I have vaginal discharge 0   I have vaginal itching/irritation 1   I have vaginal bleeding or spotting 0   I have vaginal dryness 3   I have pain or discomfort with intercourse 3   I have lost interest in sex 3   I have gained weight 3   I have mood swings 0   I am irritable 2   I have pain in my joints 2         LABS AND IMAGING REVIEWED IN EPIC     Imagin2023 BL SC MG at [facility] - which revealed in L  breast at 8 o'clock -9 o'clock middle depth, there is an irregular mass with spiculated margins and associated architectural distortion.  At 11 o'clock- 12 o'clock L breast anterior depth, there is an oval mass seen.   Additionally, there is an oval mass in the L breast upper outer quadrant middle depth.   No significant masses, calcifications, or other findings are seen in the R breast. BIRADS-0; additional imaging needed     2. 12/14/2023 L DG MG/ L US BREAST COMPLETE at [facility]- which revealed on MG:       a. At 8 o'clock middle depth, there is a persistent 0.8 cm irregular equal density mass with spiculated margins and associated architectural distortion (spanning 4.5 cm)        b. At 11 o'clock anterior depth, there is a persistent 1.0 cm oval high density mass with circumscribed margins,         c. At 1 o'clock middle depth, , there is a 0.9 cm oval equal density mass with circumscribed margins      On L US:         a. At 8 o'clock 5 cm FN,  there is a 0.8 cm x 0.7 cm x 0.7 cm irregular avascular hypoechoic mass with spiculated margins, posterior shadowing, and associated architectural distortion           b  At 11 o'clock 3 cm FN, there is a 1.0 cm x 0.6 cm x 0.8 cm oval avascular hypoechoic mass with angular margins and posterior shadowing           c. At 1 o'clock  3 cm FN, there is a 0.9 cm x 0.4 cm x 0.8 cm parallel oval avascular hypoechoic mass with circumscribed margins   BIRADS-5 highly suspicious;biopsy recommended.     3. 1/31/2024  BREAST MRI- which revealed       Left Breast-        A. At 8 o'clock 5 cm FN,  there is a 1.3 x 0.9 x 0.7 cm irregular, enhancing mass with spiculated margins (biopsy proven malignancy)        B. At 1 o'clock 3 cm FN, there is a 0.9 x 0.5 x 1.0 cm oval, enhancing mass with circumscribed margins (biopsy proven benign fibroadenomatoid change and sclerosing adenosis)         C. At 11 o'clock 3 cm FN, there is a 1.0 x 0.9 x 0.8 cm oval, enhancing mass with circumscribed  margins.(biopsy proven fibroadenoma/fibroadenomatoid change)         Right Breast- .no suspicious areas of enhancement or areas of architectural distortion are seen.  There is no skin thickening or nipple retraction.  No axillary or internal mammary lymphadenopathy is identified.     BIRADS-6 Known biopsy proven malignancy    4. 10/1/2024 BL DG MMG:   IMPRESSION: BENIGN  1) New, expected post-therapy change of the left breast and left axilla is benign.  BI-RADS 2: Benign.    2) No mammographic evidence of malignancy in either breast.       DEXA 5/15/2024  Impression:   Low bone mass.    5/3/2025 MRI Breast  IMPRESSION: BENIGN   1) No MRI evidence of malignancy in either breast.       Pathology:  1/16/2024 Ultrasound-guided Core Needle Biopsy Left Breast 8 o'clock 5 cm FN-        - Invasive ductal carcinoma, grade 1 (measuring 9.0 mm)        - Ductal carcinoma in situ, grade 2 without necrosis          ER 72%          ID 84%          HER 2 ezio 1+          Ki67 10%     2. 1/16/2024 Ultrasound-guided Core Needle Biopsy Left Breast 11 o'clock 3 cm FN-        - Fibroadenoma/ fibroadenomatoid change    3. 1/16/2024 Ultrasound-guided Core Needle Biopsy Left Breast 1 o'clock 3 cm FN-        - Fibroadenomatoid change and sclerosing adenosis    4. 2/19/2024 Left Lumpectomy with Left Lane Lymph Node Biopsy which revealed invasive ductal carcinoma, grade 1 (measuring 0.9 cm); and lobular carcinoma in situ.  All margins clear.  Three sentinel lymph nodes negative for metastaic carcinoma.       Assessment:   Stage 1A Left Hormone Positive Breast Cancer - T1bN0, G1. Oncotype 17. Discussed risks/benefits/toxicity/role for chemotherapy and she chose to not pursue adjuvant chemotherapy. She finished adjuvant XRT in April '24. Started on OS + AI in Feb '24 for planned 5-10 years.     Dexa due: July '26   Breast MRI due April '26  MMG due October '25  Plan:   - Toxicity reviewed; okay to continue Letrozole; refill sent today  -  Continue monthly Zoladex and labs only visit; due today  - Continue Calcium and Vitamin D  - Okay to take OTC Glucosamine PRN joint stiffness  - Do not recommend starting Effexor due to risk of interaction with Prozac; okay to start Vitamin E PRN hot flashes; plan to start Veozah if no improvements. She is agreeable.   - Follow up with Dr Snowden for management of Prozac and Insomnia  - Instructed to call the office for break through bleeding  - Continue Prolia as scheduled  - RTC 6 weeks for NP visit, labs same day       I spent a total of 40 minutes on the day of the visit.This includes face to face time and non-face to face time preparing to see the patient (eg, review of tests), obtaining and/or reviewing separately obtained history, documenting clinical information in the electronic or other health record, independently interpreting results and communicating results to the patient/family/caregiver, or care coordinator.    Visit today included increased complexity associated with the care of the episodic problem Stage 1A Left Hormone Positive Breast Cancer , addressing and managing the longitudinal care of the patient's Stage 1A Left Hormone Positive Breast Cancer.      TATE Daniels-C  Hematology/Oncology   [facility]

## 2025-07-09 ENCOUNTER — INFUSION (OUTPATIENT)
Dept: INFUSION THERAPY | Facility: HOSPITAL | Age: 45
End: 2025-07-09
Attending: STUDENT IN AN ORGANIZED HEALTH CARE EDUCATION/TRAINING PROGRAM
Payer: COMMERCIAL

## 2025-07-09 VITALS
TEMPERATURE: 98 F | DIASTOLIC BLOOD PRESSURE: 74 MMHG | SYSTOLIC BLOOD PRESSURE: 135 MMHG | HEART RATE: 68 BPM | OXYGEN SATURATION: 94 %

## 2025-07-09 DIAGNOSIS — Z79.811 LONG TERM (CURRENT) USE OF AROMATASE INHIBITORS: ICD-10-CM

## 2025-07-09 DIAGNOSIS — C50.312 MALIGNANT NEOPLASM OF LOWER-INNER QUADRANT OF LEFT BREAST IN FEMALE, ESTROGEN RECEPTOR POSITIVE: Primary | ICD-10-CM

## 2025-07-09 DIAGNOSIS — Z17.0 MALIGNANT NEOPLASM OF LOWER-INNER QUADRANT OF LEFT BREAST IN FEMALE, ESTROGEN RECEPTOR POSITIVE: Primary | ICD-10-CM

## 2025-07-09 LAB
B-HCG UR QL: NEGATIVE
CTP QC/QA: YES

## 2025-07-09 PROCEDURE — 96402 CHEMO HORMON ANTINEOPL SQ/IM: CPT

## 2025-07-09 PROCEDURE — 81025 URINE PREGNANCY TEST: CPT

## 2025-07-09 PROCEDURE — 63600175 PHARM REV CODE 636 W HCPCS: Mod: JZ,TB | Performed by: STUDENT IN AN ORGANIZED HEALTH CARE EDUCATION/TRAINING PROGRAM

## 2025-07-09 RX ADMIN — GOSERELIN ACETATE 3.6 MG: 3.6 IMPLANT SUBCUTANEOUS at 11:07

## 2025-08-06 ENCOUNTER — INFUSION (OUTPATIENT)
Dept: INFUSION THERAPY | Facility: HOSPITAL | Age: 45
End: 2025-08-06
Attending: STUDENT IN AN ORGANIZED HEALTH CARE EDUCATION/TRAINING PROGRAM
Payer: COMMERCIAL

## 2025-08-06 VITALS
RESPIRATION RATE: 18 BRPM | HEIGHT: 64 IN | SYSTOLIC BLOOD PRESSURE: 116 MMHG | WEIGHT: 210.19 LBS | HEART RATE: 66 BPM | BODY MASS INDEX: 35.88 KG/M2 | OXYGEN SATURATION: 99 % | DIASTOLIC BLOOD PRESSURE: 77 MMHG | TEMPERATURE: 98 F

## 2025-08-06 DIAGNOSIS — C50.312 MALIGNANT NEOPLASM OF LOWER-INNER QUADRANT OF LEFT BREAST IN FEMALE, ESTROGEN RECEPTOR POSITIVE: Primary | ICD-10-CM

## 2025-08-06 DIAGNOSIS — Z17.0 MALIGNANT NEOPLASM OF LOWER-INNER QUADRANT OF LEFT BREAST IN FEMALE, ESTROGEN RECEPTOR POSITIVE: Primary | ICD-10-CM

## 2025-08-06 DIAGNOSIS — D05.01 LOBULAR CARCINOMA IN SITU (LCIS) OF RIGHT BREAST: Primary | ICD-10-CM

## 2025-08-06 DIAGNOSIS — D05.01 LOBULAR CARCINOMA IN SITU (LCIS) OF RIGHT BREAST: ICD-10-CM

## 2025-08-06 DIAGNOSIS — Z79.811 LONG TERM (CURRENT) USE OF AROMATASE INHIBITORS: ICD-10-CM

## 2025-08-06 LAB
ALBUMIN SERPL-MCNC: 3.6 G/DL (ref 3.5–5)
ALBUMIN/GLOB SERPL: 0.9 RATIO (ref 1.1–2)
ALP SERPL-CCNC: 75 UNIT/L (ref 40–150)
ALT SERPL-CCNC: 13 UNIT/L (ref 0–55)
ANION GAP SERPL CALC-SCNC: 10 MEQ/L
AST SERPL-CCNC: 21 UNIT/L (ref 11–45)
B-HCG UR QL: NEGATIVE
BASOPHILS # BLD AUTO: 0.02 X10(3)/MCL
BASOPHILS NFR BLD AUTO: 0.3 %
BILIRUB SERPL-MCNC: 0.2 MG/DL
BUN SERPL-MCNC: 11.8 MG/DL (ref 7–18.7)
CALCIUM SERPL-MCNC: 9.6 MG/DL (ref 8.4–10.2)
CHLORIDE SERPL-SCNC: 104 MMOL/L (ref 98–107)
CO2 SERPL-SCNC: 30 MMOL/L (ref 22–29)
CREAT SERPL-MCNC: 0.78 MG/DL (ref 0.55–1.02)
CREAT/UREA NIT SERPL: 15
CTP QC/QA: YES
EOSINOPHIL # BLD AUTO: 0.14 X10(3)/MCL (ref 0–0.9)
EOSINOPHIL NFR BLD AUTO: 2.4 %
ERYTHROCYTE [DISTWIDTH] IN BLOOD BY AUTOMATED COUNT: 12.4 % (ref 11.5–17)
GFR SERPLBLD CREATININE-BSD FMLA CKD-EPI: >60 ML/MIN/1.73/M2
GLOBULIN SER-MCNC: 3.9 GM/DL (ref 2.4–3.5)
GLUCOSE SERPL-MCNC: 98 MG/DL (ref 74–100)
HCT VFR BLD AUTO: 44.1 % (ref 37–47)
HGB BLD-MCNC: 14.3 G/DL (ref 12–16)
IMM GRANULOCYTES # BLD AUTO: 0.01 X10(3)/MCL (ref 0–0.04)
IMM GRANULOCYTES NFR BLD AUTO: 0.2 %
LYMPHOCYTES # BLD AUTO: 1.32 X10(3)/MCL (ref 0.6–4.6)
LYMPHOCYTES NFR BLD AUTO: 22.7 %
MCH RBC QN AUTO: 31 PG (ref 27–31)
MCHC RBC AUTO-ENTMCNC: 32.4 G/DL (ref 33–36)
MCV RBC AUTO: 95.5 FL (ref 80–94)
MONOCYTES # BLD AUTO: 0.38 X10(3)/MCL (ref 0.1–1.3)
MONOCYTES NFR BLD AUTO: 6.5 %
NEUTROPHILS # BLD AUTO: 3.94 X10(3)/MCL (ref 2.1–9.2)
NEUTROPHILS NFR BLD AUTO: 67.9 %
PLATELET # BLD AUTO: 265 X10(3)/MCL (ref 130–400)
PMV BLD AUTO: 10.1 FL (ref 7.4–10.4)
POTASSIUM SERPL-SCNC: 4.3 MMOL/L (ref 3.5–5.1)
PROT SERPL-MCNC: 7.5 GM/DL (ref 6.4–8.3)
RBC # BLD AUTO: 4.62 X10(6)/MCL (ref 4.2–5.4)
SODIUM SERPL-SCNC: 144 MMOL/L (ref 136–145)
WBC # BLD AUTO: 5.81 X10(3)/MCL (ref 4.5–11.5)

## 2025-08-06 PROCEDURE — 81025 URINE PREGNANCY TEST: CPT | Performed by: STUDENT IN AN ORGANIZED HEALTH CARE EDUCATION/TRAINING PROGRAM

## 2025-08-06 PROCEDURE — 80053 COMPREHEN METABOLIC PANEL: CPT

## 2025-08-06 PROCEDURE — 96402 CHEMO HORMON ANTINEOPL SQ/IM: CPT

## 2025-08-06 PROCEDURE — 85025 COMPLETE CBC W/AUTO DIFF WBC: CPT

## 2025-08-06 PROCEDURE — 63600175 PHARM REV CODE 636 W HCPCS: Mod: JZ,TB | Performed by: STUDENT IN AN ORGANIZED HEALTH CARE EDUCATION/TRAINING PROGRAM

## 2025-08-06 RX ADMIN — GOSERELIN ACETATE 3.6 MG: 3.6 IMPLANT SUBCUTANEOUS at 12:08

## 2025-08-06 NOTE — PLAN OF CARE
Plan of care reviewed with patient. Zoladex (R) completed. Appts reviewed with patient; patient uses portal.

## 2025-08-12 ENCOUNTER — OFFICE VISIT (OUTPATIENT)
Dept: HEMATOLOGY/ONCOLOGY | Facility: CLINIC | Age: 45
End: 2025-08-12
Payer: COMMERCIAL

## 2025-08-12 VITALS
HEIGHT: 64 IN | RESPIRATION RATE: 18 BRPM | HEART RATE: 64 BPM | BODY MASS INDEX: 35.73 KG/M2 | SYSTOLIC BLOOD PRESSURE: 138 MMHG | TEMPERATURE: 98 F | WEIGHT: 209.31 LBS | OXYGEN SATURATION: 99 % | DIASTOLIC BLOOD PRESSURE: 81 MMHG

## 2025-08-12 DIAGNOSIS — Z17.0 MALIGNANT NEOPLASM OF LOWER-INNER QUADRANT OF LEFT BREAST IN FEMALE, ESTROGEN RECEPTOR POSITIVE: Primary | ICD-10-CM

## 2025-08-12 DIAGNOSIS — C50.312 MALIGNANT NEOPLASM OF LOWER-INNER QUADRANT OF LEFT BREAST IN FEMALE, ESTROGEN RECEPTOR POSITIVE: Primary | ICD-10-CM

## 2025-08-12 DIAGNOSIS — Z79.811 LONG TERM (CURRENT) USE OF AROMATASE INHIBITORS: ICD-10-CM

## 2025-08-12 PROCEDURE — 1159F MED LIST DOCD IN RCRD: CPT | Mod: CPTII,S$GLB,,

## 2025-08-12 PROCEDURE — 99215 OFFICE O/P EST HI 40 MIN: CPT | Mod: S$GLB,,,

## 2025-08-12 PROCEDURE — 1160F RVW MEDS BY RX/DR IN RCRD: CPT | Mod: CPTII,S$GLB,,

## 2025-08-12 PROCEDURE — G2211 COMPLEX E/M VISIT ADD ON: HCPCS | Mod: S$GLB,,,

## 2025-08-12 PROCEDURE — 3079F DIAST BP 80-89 MM HG: CPT | Mod: CPTII,S$GLB,,

## 2025-08-12 PROCEDURE — 3008F BODY MASS INDEX DOCD: CPT | Mod: CPTII,S$GLB,,

## 2025-08-12 PROCEDURE — 99999 PR PBB SHADOW E&M-EST. PATIENT-LVL IV: CPT | Mod: PBBFAC,,,

## 2025-08-12 PROCEDURE — 3075F SYST BP GE 130 - 139MM HG: CPT | Mod: CPTII,S$GLB,,

## 2025-09-03 ENCOUNTER — INFUSION (OUTPATIENT)
Dept: INFUSION THERAPY | Facility: HOSPITAL | Age: 45
End: 2025-09-03
Attending: STUDENT IN AN ORGANIZED HEALTH CARE EDUCATION/TRAINING PROGRAM
Payer: COMMERCIAL

## 2025-09-03 VITALS
TEMPERATURE: 98 F | OXYGEN SATURATION: 98 % | DIASTOLIC BLOOD PRESSURE: 67 MMHG | RESPIRATION RATE: 18 BRPM | HEART RATE: 74 BPM | SYSTOLIC BLOOD PRESSURE: 130 MMHG

## 2025-09-03 DIAGNOSIS — M85.80 OSTEOPENIA, UNSPECIFIED LOCATION: ICD-10-CM

## 2025-09-03 DIAGNOSIS — Z17.0 MALIGNANT NEOPLASM OF LOWER-INNER QUADRANT OF LEFT BREAST IN FEMALE, ESTROGEN RECEPTOR POSITIVE: Primary | ICD-10-CM

## 2025-09-03 DIAGNOSIS — C50.312 MALIGNANT NEOPLASM OF LOWER-INNER QUADRANT OF LEFT BREAST IN FEMALE, ESTROGEN RECEPTOR POSITIVE: Primary | ICD-10-CM

## 2025-09-03 LAB
B-HCG UR QL: NEGATIVE
CTP QC/QA: YES

## 2025-09-03 PROCEDURE — 96372 THER/PROPH/DIAG INJ SC/IM: CPT | Mod: 59

## 2025-09-03 PROCEDURE — 96402 CHEMO HORMON ANTINEOPL SQ/IM: CPT

## 2025-09-03 PROCEDURE — 81025 URINE PREGNANCY TEST: CPT | Performed by: STUDENT IN AN ORGANIZED HEALTH CARE EDUCATION/TRAINING PROGRAM

## 2025-09-03 PROCEDURE — 63600175 PHARM REV CODE 636 W HCPCS: Mod: JZ,TB

## 2025-09-03 RX ADMIN — DENOSUMAB 60 MG: 60 INJECTION SUBCUTANEOUS at 03:09

## 2025-09-03 RX ADMIN — GOSERELIN ACETATE 3.6 MG: 3.6 IMPLANT SUBCUTANEOUS at 03:09

## (undated) DEVICE — CHARM MARGINMAP SPEC 5MM

## (undated) DEVICE — NDL HYPO REG 25G X 1 1/2

## (undated) DEVICE — BLADE SURG STAINLESS STEEL #15

## (undated) DEVICE — GLOVE 6.0 PROTEXIS PI MICRO

## (undated) DEVICE — GLOVE SENSICARE PI GRN 6.5

## (undated) DEVICE — NDL SYR 10ML 18X1.5 LL BLUNT

## (undated) DEVICE — PROBE TRUNODE GAMMA HAND PIECE

## (undated) DEVICE — SOL IRRI STRL WATER 1000ML

## (undated) DEVICE — ADHESIVE DERMABOND ADVANCED

## (undated) DEVICE — SUPPORT ULNA NERVE PROTECTOR

## (undated) DEVICE — GLOVE SENSICARE PI MICRO 6

## (undated) DEVICE — COVER PROBE WITH BAND 6X96IN

## (undated) DEVICE — Device

## (undated) DEVICE — MARGIN MARKER STANDARD 6 COLOR

## (undated) DEVICE — ILLUMINATOR PHOTONBLADE 8/11IN

## (undated) DEVICE — SUT SILK 3-0 BLK BR SH 30IN

## (undated) DEVICE — SUT STRATAFIX 4-0 30CM PS-2

## (undated) DEVICE — DRAPE FLUID WARMER ORS 44X44IN

## (undated) DEVICE — ELECTRODE PATIENT RETURN DISP

## (undated) DEVICE — GOWN X-LG STERILE BACK

## (undated) DEVICE — SPONGE LAP 18X18 PREWASHED

## (undated) DEVICE — DRESSING TRANS 4X4 TEGADERM

## (undated) DEVICE — APPLICATOR CHLORAPREP ORN 26ML

## (undated) DEVICE — GLOVE SIGNATURE MICRO LTX 6.5

## (undated) DEVICE — GAUZE SPONGE 4X4 12PLY

## (undated) DEVICE — BLANKET SNUGGLE WARM LOWER BDY

## (undated) DEVICE — SYR 3ML LL 18GA 1.5IN

## (undated) DEVICE — CLOSURE SKIN STERI STRIP 1/2X4

## (undated) DEVICE — SUT MCRYL PLUS 3-0 PS2 27IN

## (undated) DEVICE — APPLIER CLIP LIAGCLIP 9.375IN